# Patient Record
Sex: FEMALE | Race: WHITE | NOT HISPANIC OR LATINO
[De-identification: names, ages, dates, MRNs, and addresses within clinical notes are randomized per-mention and may not be internally consistent; named-entity substitution may affect disease eponyms.]

---

## 2017-07-25 ENCOUNTER — APPOINTMENT (OUTPATIENT)
Dept: ORTHOPEDIC SURGERY | Facility: CLINIC | Age: 72
End: 2017-07-25

## 2017-07-25 VITALS
WEIGHT: 144 LBS | SYSTOLIC BLOOD PRESSURE: 136 MMHG | HEART RATE: 86 BPM | DIASTOLIC BLOOD PRESSURE: 90 MMHG | HEIGHT: 61 IN | BODY MASS INDEX: 27.19 KG/M2

## 2017-07-25 DIAGNOSIS — Z87.39 PERSONAL HISTORY OF OTHER DISEASES OF THE MUSCULOSKELETAL SYSTEM AND CONNECTIVE TISSUE: ICD-10-CM

## 2017-07-25 DIAGNOSIS — Z78.9 OTHER SPECIFIED HEALTH STATUS: ICD-10-CM

## 2017-07-25 DIAGNOSIS — Z82.61 FAMILY HISTORY OF ARTHRITIS: ICD-10-CM

## 2017-07-25 RX ORDER — GLUCOSAM/MSM/CHOND/HYALURON AC 750-60-150
TABLET ORAL
Refills: 0 | Status: ACTIVE | COMMUNITY

## 2017-07-25 RX ORDER — TRIAMTERENE AND HYDROCHLOROTHIAZIDE 37.5; 25 MG/1; MG/1
CAPSULE ORAL
Refills: 0 | Status: ACTIVE | COMMUNITY

## 2017-07-25 RX ORDER — ASCORBIC ACID 500 MG
TABLET ORAL
Refills: 0 | Status: ACTIVE | COMMUNITY

## 2017-07-25 RX ORDER — APIXABAN 5 MG/1
TABLET, FILM COATED ORAL
Refills: 0 | Status: ACTIVE | COMMUNITY

## 2017-07-25 RX ORDER — CHLORHEXIDINE GLUCONATE 4 %
LIQUID (ML) TOPICAL
Refills: 0 | Status: ACTIVE | COMMUNITY

## 2017-07-25 RX ORDER — FERROUS GLUCONATE 324(37.5)
TABLET ORAL
Refills: 0 | Status: ACTIVE | COMMUNITY

## 2017-08-22 ENCOUNTER — OUTPATIENT (OUTPATIENT)
Dept: OUTPATIENT SERVICES | Facility: HOSPITAL | Age: 72
LOS: 1 days | End: 2017-08-22
Payer: MEDICARE

## 2017-08-22 ENCOUNTER — APPOINTMENT (OUTPATIENT)
Dept: RADIOLOGY | Facility: CLINIC | Age: 72
End: 2017-08-22

## 2017-08-22 ENCOUNTER — APPOINTMENT (OUTPATIENT)
Dept: MAMMOGRAPHY | Facility: CLINIC | Age: 72
End: 2017-08-22

## 2017-08-22 DIAGNOSIS — Z00.8 ENCOUNTER FOR OTHER GENERAL EXAMINATION: ICD-10-CM

## 2017-08-22 PROCEDURE — 77063 BREAST TOMOSYNTHESIS BI: CPT

## 2017-08-22 PROCEDURE — 77067 SCR MAMMO BI INCL CAD: CPT

## 2017-08-22 PROCEDURE — G0202: CPT | Mod: 26

## 2017-08-22 PROCEDURE — 77080 DXA BONE DENSITY AXIAL: CPT | Mod: 26

## 2017-08-22 PROCEDURE — 77080 DXA BONE DENSITY AXIAL: CPT

## 2017-08-22 PROCEDURE — 77063 BREAST TOMOSYNTHESIS BI: CPT | Mod: 26

## 2017-08-24 ENCOUNTER — APPOINTMENT (OUTPATIENT)
Dept: MAMMOGRAPHY | Facility: CLINIC | Age: 72
End: 2017-08-24
Payer: MEDICARE

## 2017-08-24 ENCOUNTER — APPOINTMENT (OUTPATIENT)
Dept: ULTRASOUND IMAGING | Facility: CLINIC | Age: 72
End: 2017-08-24
Payer: MEDICARE

## 2017-08-24 ENCOUNTER — OUTPATIENT (OUTPATIENT)
Dept: OUTPATIENT SERVICES | Facility: HOSPITAL | Age: 72
LOS: 1 days | End: 2017-08-24
Payer: MEDICARE

## 2017-08-24 DIAGNOSIS — Z00.8 ENCOUNTER FOR OTHER GENERAL EXAMINATION: ICD-10-CM

## 2017-08-24 PROCEDURE — G0279: CPT | Mod: 26

## 2017-08-24 PROCEDURE — 77065 DX MAMMO INCL CAD UNI: CPT

## 2017-08-24 PROCEDURE — 76642 ULTRASOUND BREAST LIMITED: CPT

## 2017-08-24 PROCEDURE — G0279: CPT

## 2017-08-24 PROCEDURE — 76642 ULTRASOUND BREAST LIMITED: CPT | Mod: 26,LT

## 2017-08-24 PROCEDURE — G0206: CPT | Mod: 26,LT

## 2017-09-15 ENCOUNTER — OUTPATIENT (OUTPATIENT)
Dept: OUTPATIENT SERVICES | Facility: HOSPITAL | Age: 72
LOS: 1 days | End: 2017-09-15
Payer: MEDICARE

## 2017-09-15 VITALS
TEMPERATURE: 98 F | HEART RATE: 68 BPM | SYSTOLIC BLOOD PRESSURE: 138 MMHG | DIASTOLIC BLOOD PRESSURE: 84 MMHG | OXYGEN SATURATION: 98 % | WEIGHT: 143.3 LBS | RESPIRATION RATE: 14 BRPM | HEIGHT: 61 IN

## 2017-09-15 DIAGNOSIS — Z90.89 ACQUIRED ABSENCE OF OTHER ORGANS: Chronic | ICD-10-CM

## 2017-09-15 DIAGNOSIS — Z98.1 ARTHRODESIS STATUS: Chronic | ICD-10-CM

## 2017-09-15 DIAGNOSIS — Z90.711 ACQUIRED ABSENCE OF UTERUS WITH REMAINING CERVICAL STUMP: Chronic | ICD-10-CM

## 2017-09-15 DIAGNOSIS — Z87.39 PERSONAL HISTORY OF OTHER DISEASES OF THE MUSCULOSKELETAL SYSTEM AND CONNECTIVE TISSUE: ICD-10-CM

## 2017-09-15 DIAGNOSIS — Z90.49 ACQUIRED ABSENCE OF OTHER SPECIFIED PARTS OF DIGESTIVE TRACT: Chronic | ICD-10-CM

## 2017-09-15 DIAGNOSIS — M17.11 UNILATERAL PRIMARY OSTEOARTHRITIS, RIGHT KNEE: ICD-10-CM

## 2017-09-15 DIAGNOSIS — Z01.818 ENCOUNTER FOR OTHER PREPROCEDURAL EXAMINATION: ICD-10-CM

## 2017-09-15 LAB
ALBUMIN SERPL ELPH-MCNC: 3.9 G/DL — SIGNIFICANT CHANGE UP (ref 3.3–5)
ALP SERPL-CCNC: 120 U/L — SIGNIFICANT CHANGE UP (ref 30–120)
ALT FLD-CCNC: 46 U/L DA — SIGNIFICANT CHANGE UP (ref 10–60)
ANION GAP SERPL CALC-SCNC: 9 MMOL/L — SIGNIFICANT CHANGE UP (ref 5–17)
APTT BLD: 44.3 SEC — HIGH (ref 27.5–37.4)
AST SERPL-CCNC: 30 U/L — SIGNIFICANT CHANGE UP (ref 10–40)
BILIRUB SERPL-MCNC: 0.4 MG/DL — SIGNIFICANT CHANGE UP (ref 0.2–1.2)
BLD GP AB SCN SERPL QL: SIGNIFICANT CHANGE UP
BUN SERPL-MCNC: 16 MG/DL — SIGNIFICANT CHANGE UP (ref 7–23)
CALCIUM SERPL-MCNC: 9.7 MG/DL — SIGNIFICANT CHANGE UP (ref 8.4–10.5)
CHLORIDE SERPL-SCNC: 102 MMOL/L — SIGNIFICANT CHANGE UP (ref 96–108)
CO2 SERPL-SCNC: 30 MMOL/L — SIGNIFICANT CHANGE UP (ref 22–31)
CREAT SERPL-MCNC: 0.99 MG/DL — SIGNIFICANT CHANGE UP (ref 0.5–1.3)
GLUCOSE SERPL-MCNC: 88 MG/DL — SIGNIFICANT CHANGE UP (ref 70–99)
HCT VFR BLD CALC: 35.1 % — SIGNIFICANT CHANGE UP (ref 34.5–45)
HGB BLD-MCNC: 10.9 G/DL — LOW (ref 11.5–15.5)
INR BLD: 1.32 RATIO — HIGH (ref 0.88–1.16)
MCHC RBC-ENTMCNC: 27.4 PG — SIGNIFICANT CHANGE UP (ref 27–34)
MCHC RBC-ENTMCNC: 31 GM/DL — LOW (ref 32–36)
MCV RBC AUTO: 88.3 FL — SIGNIFICANT CHANGE UP (ref 80–100)
MRSA PCR RESULT.: DETECTED
PLATELET # BLD AUTO: 335 K/UL — SIGNIFICANT CHANGE UP (ref 150–400)
POTASSIUM SERPL-MCNC: 3.6 MMOL/L — SIGNIFICANT CHANGE UP (ref 3.5–5.3)
POTASSIUM SERPL-SCNC: 3.6 MMOL/L — SIGNIFICANT CHANGE UP (ref 3.5–5.3)
PROT SERPL-MCNC: 7.5 G/DL — SIGNIFICANT CHANGE UP (ref 6–8.3)
PROTHROM AB SERPL-ACNC: 14.5 SEC — HIGH (ref 9.8–12.7)
RBC # BLD: 3.98 M/UL — SIGNIFICANT CHANGE UP (ref 3.8–5.2)
RBC # FLD: 14.9 % — HIGH (ref 10.3–14.5)
S AUREUS DNA NOSE QL NAA+PROBE: DETECTED
SODIUM SERPL-SCNC: 141 MMOL/L — SIGNIFICANT CHANGE UP (ref 135–145)
WBC # BLD: 6.5 K/UL — SIGNIFICANT CHANGE UP (ref 3.8–10.5)
WBC # FLD AUTO: 6.5 K/UL — SIGNIFICANT CHANGE UP (ref 3.8–10.5)

## 2017-09-15 PROCEDURE — 80053 COMPREHEN METABOLIC PANEL: CPT

## 2017-09-15 PROCEDURE — 87641 MR-STAPH DNA AMP PROBE: CPT

## 2017-09-15 PROCEDURE — 93005 ELECTROCARDIOGRAM TRACING: CPT

## 2017-09-15 PROCEDURE — 85027 COMPLETE CBC AUTOMATED: CPT

## 2017-09-15 PROCEDURE — 86850 RBC ANTIBODY SCREEN: CPT

## 2017-09-15 PROCEDURE — 87640 STAPH A DNA AMP PROBE: CPT

## 2017-09-15 PROCEDURE — 93010 ELECTROCARDIOGRAM REPORT: CPT | Mod: NC

## 2017-09-15 PROCEDURE — 86901 BLOOD TYPING SEROLOGIC RH(D): CPT

## 2017-09-15 PROCEDURE — 85730 THROMBOPLASTIN TIME PARTIAL: CPT

## 2017-09-15 PROCEDURE — 85610 PROTHROMBIN TIME: CPT

## 2017-09-15 PROCEDURE — 86900 BLOOD TYPING SEROLOGIC ABO: CPT

## 2017-09-15 PROCEDURE — G0463: CPT

## 2017-09-15 NOTE — H&P PST ADULT - PSH
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

## 2017-09-15 NOTE — H&P PST ADULT - NSANTHOSAYNRD_GEN_A_CORE
No. RODO screening performed.  STOP BANG Legend: 0-2 = LOW Risk; 3-4 = INTERMEDIATE Risk; 5-8 = HIGH Risk

## 2017-09-15 NOTE — H&P PST ADULT - PROBLEM SELECTOR PLAN 1
Right Total Knee Replacement   NPO after Midnight. Take pepcid as ordered.  Nose cx instructions reviewed. Dietary instructions reviewed. 3day wipes reviewed. D/C instructions reviewed.  Patient advised of no jewelry day of surgery.  Handouts given.  Medication reviewed. Stop NSAIDS, ASA herbals, vit E per PMD instructions or 7 days prior to surgery .  Medical Clearance to be obtained.  ANAND.[email or web address] EDMONDSON  Anesthesia and Pharmacy consult done.  Day of Surgery you can take the following meds with a small sip of water. Lexapro, Molteq, Metoprolol speak w pmd/cardio on stopping eliquis

## 2017-09-15 NOTE — H&P PST ADULT - PMH
Atrial fibrillation    Depression    History of osteoarthritis    Hypertension    Lumbar disc disorder    Ulcerative colitis  signs of precancer had partial colectomy

## 2017-09-18 RX ORDER — MUPIROCIN 20 MG/G
1 OINTMENT TOPICAL
Qty: 1 | Refills: 0
Start: 2017-09-18 | End: 2017-09-23

## 2017-09-18 NOTE — PROGRESS NOTE ADULT - SUBJECTIVE AND OBJECTIVE BOX
Nasal culture PCR: staph aureus and MRSA detected  Topical Mupirocin escribed  Spoke to patient, understands treatment

## 2017-10-04 ENCOUNTER — APPOINTMENT (OUTPATIENT)
Dept: ORTHOPEDIC SURGERY | Facility: HOSPITAL | Age: 72
End: 2017-10-04

## 2017-12-26 ENCOUNTER — APPOINTMENT (OUTPATIENT)
Dept: MAMMOGRAPHY | Facility: CLINIC | Age: 72
End: 2017-12-26
Payer: MEDICARE

## 2017-12-26 ENCOUNTER — OUTPATIENT (OUTPATIENT)
Dept: OUTPATIENT SERVICES | Facility: HOSPITAL | Age: 72
LOS: 1 days | End: 2017-12-26
Payer: MEDICARE

## 2017-12-26 DIAGNOSIS — Z90.89 ACQUIRED ABSENCE OF OTHER ORGANS: Chronic | ICD-10-CM

## 2017-12-26 DIAGNOSIS — Z90.711 ACQUIRED ABSENCE OF UTERUS WITH REMAINING CERVICAL STUMP: Chronic | ICD-10-CM

## 2017-12-26 DIAGNOSIS — Z98.1 ARTHRODESIS STATUS: Chronic | ICD-10-CM

## 2017-12-26 DIAGNOSIS — Z00.8 ENCOUNTER FOR OTHER GENERAL EXAMINATION: ICD-10-CM

## 2017-12-26 DIAGNOSIS — Z90.49 ACQUIRED ABSENCE OF OTHER SPECIFIED PARTS OF DIGESTIVE TRACT: Chronic | ICD-10-CM

## 2017-12-26 PROCEDURE — G0279: CPT | Mod: 26

## 2017-12-26 PROCEDURE — 77065 DX MAMMO INCL CAD UNI: CPT

## 2017-12-26 PROCEDURE — G0206: CPT | Mod: 26,LT

## 2017-12-26 PROCEDURE — G0279: CPT

## 2018-07-16 PROBLEM — K51.90 ULCERATIVE COLITIS, UNSPECIFIED, WITHOUT COMPLICATIONS: Chronic | Status: ACTIVE | Noted: 2017-09-15

## 2018-08-27 PROBLEM — I10 ESSENTIAL (PRIMARY) HYPERTENSION: Chronic | Status: ACTIVE | Noted: 2017-09-15

## 2018-08-27 PROBLEM — M51.9 UNSPECIFIED THORACIC, THORACOLUMBAR AND LUMBOSACRAL INTERVERTEBRAL DISC DISORDER: Chronic | Status: ACTIVE | Noted: 2017-09-15

## 2018-08-28 ENCOUNTER — OUTPATIENT (OUTPATIENT)
Dept: OUTPATIENT SERVICES | Facility: HOSPITAL | Age: 73
LOS: 1 days | End: 2018-08-28
Payer: MEDICARE

## 2018-08-28 ENCOUNTER — APPOINTMENT (OUTPATIENT)
Dept: ULTRASOUND IMAGING | Facility: CLINIC | Age: 73
End: 2018-08-28
Payer: MEDICARE

## 2018-08-28 ENCOUNTER — APPOINTMENT (OUTPATIENT)
Dept: MAMMOGRAPHY | Facility: CLINIC | Age: 73
End: 2018-08-28
Payer: MEDICARE

## 2018-08-28 DIAGNOSIS — Z90.49 ACQUIRED ABSENCE OF OTHER SPECIFIED PARTS OF DIGESTIVE TRACT: Chronic | ICD-10-CM

## 2018-08-28 DIAGNOSIS — Z98.1 ARTHRODESIS STATUS: Chronic | ICD-10-CM

## 2018-08-28 DIAGNOSIS — Z00.8 ENCOUNTER FOR OTHER GENERAL EXAMINATION: ICD-10-CM

## 2018-08-28 DIAGNOSIS — Z90.711 ACQUIRED ABSENCE OF UTERUS WITH REMAINING CERVICAL STUMP: Chronic | ICD-10-CM

## 2018-08-28 DIAGNOSIS — Z90.89 ACQUIRED ABSENCE OF OTHER ORGANS: Chronic | ICD-10-CM

## 2018-08-28 PROCEDURE — 77063 BREAST TOMOSYNTHESIS BI: CPT | Mod: 26

## 2018-08-28 PROCEDURE — 77067 SCR MAMMO BI INCL CAD: CPT

## 2018-08-28 PROCEDURE — 77063 BREAST TOMOSYNTHESIS BI: CPT

## 2018-08-28 PROCEDURE — 77067 SCR MAMMO BI INCL CAD: CPT | Mod: 26

## 2019-03-01 ENCOUNTER — APPOINTMENT (OUTPATIENT)
Dept: BREAST CENTER | Facility: CLINIC | Age: 74
End: 2019-03-01
Payer: MEDICARE

## 2019-03-01 VITALS
HEIGHT: 61 IN | WEIGHT: 143 LBS | SYSTOLIC BLOOD PRESSURE: 126 MMHG | HEART RATE: 101 BPM | DIASTOLIC BLOOD PRESSURE: 85 MMHG | BODY MASS INDEX: 27 KG/M2

## 2019-03-01 DIAGNOSIS — Z86.79 PERSONAL HISTORY OF OTHER DISEASES OF THE CIRCULATORY SYSTEM: ICD-10-CM

## 2019-03-01 DIAGNOSIS — N63.20 UNSPECIFIED LUMP IN THE LEFT BREAST, UNSPECIFIED QUADRANT: ICD-10-CM

## 2019-03-01 DIAGNOSIS — Z87.891 PERSONAL HISTORY OF NICOTINE DEPENDENCE: ICD-10-CM

## 2019-03-01 DIAGNOSIS — Z81.8 FAMILY HISTORY OF OTHER MENTAL AND BEHAVIORAL DISORDERS: ICD-10-CM

## 2019-03-01 DIAGNOSIS — Z78.9 OTHER SPECIFIED HEALTH STATUS: ICD-10-CM

## 2019-03-01 PROCEDURE — 99204 OFFICE O/P NEW MOD 45 MIN: CPT | Mod: 25

## 2019-03-01 RX ORDER — AMIODARONE HYDROCHLORIDE 200 MG/1
200 TABLET ORAL
Refills: 0 | Status: ACTIVE | COMMUNITY

## 2019-03-01 RX ORDER — UBIDECARENONE/VIT E ACET 100MG-5
CAPSULE ORAL
Refills: 0 | Status: ACTIVE | COMMUNITY

## 2019-03-01 RX ORDER — CHOLECALCIFEROL (VITAMIN D3) 25 MCG
TABLET,CHEWABLE ORAL
Refills: 0 | Status: ACTIVE | COMMUNITY

## 2019-03-01 RX ORDER — METOPROLOL SUCCINATE 200 MG/1
TABLET, EXTENDED RELEASE ORAL DAILY
Refills: 0 | Status: ACTIVE | COMMUNITY

## 2019-03-01 RX ORDER — ESCITALOPRAM OXALATE 5 MG/1
TABLET, FILM COATED ORAL
Refills: 0 | Status: ACTIVE | COMMUNITY

## 2019-03-01 RX ORDER — DRONEDARONE 400 MG/1
TABLET, FILM COATED ORAL
Refills: 0 | Status: DISCONTINUED | COMMUNITY
End: 2019-03-01

## 2019-03-01 NOTE — PAST MEDICAL HISTORY
[Postmenopausal] : The patient is postmenopausal [Menarche Age ____] : age at menarche was [unfilled] [Menopause Age____] : age at menopause was [unfilled] [History of Hormone Replacement Treatment] : has a history of hormone replacement treatment [Total Preg ___] : G[unfilled] [Live Births ___] : P[unfilled]  [Age At Live Birth ___] : Age at live birth: [unfilled]

## 2019-03-01 NOTE — HISTORY OF PRESENT ILLNESS
[FreeTextEntry1] : 73 year old female felt a "lump" in her left breast a few weeks ago while doing a breast self exam.  She describes it as being the size of a small "bead".  No associated discomfort.  Her last mammogram was in 08/18.   The patient was referred by Dr. Donovan French for a breast surgical consultation.

## 2019-03-01 NOTE — CONSULT LETTER
[Dear  ___] : Dear ~GEOVANY, [Consult Letter:] : I had the pleasure of evaluating your patient, [unfilled]. [Please see my note below.] : Please see my note below. [Consult Closing:] : Thank you very much for allowing me to participate in the care of this patient.  If you have any questions, please do not hesitate to contact me. [Sincerely,] : Sincerely, [FreeTextEntry2] : Donovan French MD [FreeTextEntry3] : Kaila Bloom MD FACS\par  [DrGiselle  ___] : Dr. ROSA

## 2019-03-01 NOTE — PROCEDURE
[FreeTextEntry3] : Targeted Left breast ultrasound   3:00 N6\par Findings:  No masses or cysts in the area of clinical concern from 2-4:00 axis.

## 2020-06-13 ENCOUNTER — APPOINTMENT (OUTPATIENT)
Dept: ORTHOPEDIC SURGERY | Facility: CLINIC | Age: 75
End: 2020-06-13
Payer: MEDICARE

## 2020-06-13 VITALS
DIASTOLIC BLOOD PRESSURE: 69 MMHG | HEART RATE: 57 BPM | BODY MASS INDEX: 27.38 KG/M2 | HEIGHT: 61 IN | SYSTOLIC BLOOD PRESSURE: 101 MMHG | TEMPERATURE: 98 F | WEIGHT: 145 LBS

## 2020-06-13 DIAGNOSIS — M17.12 UNILATERAL PRIMARY OSTEOARTHRITIS, LEFT KNEE: ICD-10-CM

## 2020-06-13 PROCEDURE — 73562 X-RAY EXAM OF KNEE 3: CPT | Mod: LT

## 2020-06-13 PROCEDURE — 72170 X-RAY EXAM OF PELVIS: CPT | Mod: 59

## 2020-06-13 PROCEDURE — 99214 OFFICE O/P EST MOD 30 MIN: CPT

## 2021-08-26 ENCOUNTER — OUTPATIENT (OUTPATIENT)
Dept: OUTPATIENT SERVICES | Facility: HOSPITAL | Age: 76
LOS: 1 days | End: 2021-08-26
Payer: MEDICARE

## 2021-08-26 VITALS
RESPIRATION RATE: 15 BRPM | HEIGHT: 61 IN | WEIGHT: 134.04 LBS | TEMPERATURE: 98 F | HEART RATE: 76 BPM | OXYGEN SATURATION: 97 % | SYSTOLIC BLOOD PRESSURE: 132 MMHG | DIASTOLIC BLOOD PRESSURE: 78 MMHG

## 2021-08-26 DIAGNOSIS — Z98.1 ARTHRODESIS STATUS: Chronic | ICD-10-CM

## 2021-08-26 DIAGNOSIS — Z90.711 ACQUIRED ABSENCE OF UTERUS WITH REMAINING CERVICAL STUMP: Chronic | ICD-10-CM

## 2021-08-26 DIAGNOSIS — M25.511 PAIN IN RIGHT SHOULDER: ICD-10-CM

## 2021-08-26 DIAGNOSIS — Z90.89 ACQUIRED ABSENCE OF OTHER ORGANS: Chronic | ICD-10-CM

## 2021-08-26 DIAGNOSIS — I48.91 UNSPECIFIED ATRIAL FIBRILLATION: ICD-10-CM

## 2021-08-26 DIAGNOSIS — Z98.890 OTHER SPECIFIED POSTPROCEDURAL STATES: Chronic | ICD-10-CM

## 2021-08-26 DIAGNOSIS — Z01.818 ENCOUNTER FOR OTHER PREPROCEDURAL EXAMINATION: ICD-10-CM

## 2021-08-26 DIAGNOSIS — M75.121 COMPLETE ROTATOR CUFF TEAR OR RUPTURE OF RIGHT SHOULDER, NOT SPECIFIED AS TRAUMATIC: ICD-10-CM

## 2021-08-26 DIAGNOSIS — Z90.49 ACQUIRED ABSENCE OF OTHER SPECIFIED PARTS OF DIGESTIVE TRACT: Chronic | ICD-10-CM

## 2021-08-26 LAB
A1C WITH ESTIMATED AVERAGE GLUCOSE RESULT: 6.1 % — HIGH (ref 4–5.6)
ALBUMIN SERPL ELPH-MCNC: 3.5 G/DL — SIGNIFICANT CHANGE UP (ref 3.3–5)
ALP SERPL-CCNC: 85 U/L — SIGNIFICANT CHANGE UP (ref 40–120)
ALT FLD-CCNC: 22 U/L — SIGNIFICANT CHANGE UP (ref 12–78)
ANION GAP SERPL CALC-SCNC: 6 MMOL/L — SIGNIFICANT CHANGE UP (ref 5–17)
APTT BLD: 55.2 SEC — HIGH (ref 27.5–35.5)
AST SERPL-CCNC: 23 U/L — SIGNIFICANT CHANGE UP (ref 15–37)
BILIRUB SERPL-MCNC: 0.7 MG/DL — SIGNIFICANT CHANGE UP (ref 0.2–1.2)
BUN SERPL-MCNC: 13 MG/DL — SIGNIFICANT CHANGE UP (ref 7–23)
CALCIUM SERPL-MCNC: 9.4 MG/DL — SIGNIFICANT CHANGE UP (ref 8.5–10.1)
CHLORIDE SERPL-SCNC: 101 MMOL/L — SIGNIFICANT CHANGE UP (ref 96–108)
CO2 SERPL-SCNC: 30 MMOL/L — SIGNIFICANT CHANGE UP (ref 22–31)
CREAT SERPL-MCNC: 0.96 MG/DL — SIGNIFICANT CHANGE UP (ref 0.5–1.3)
ESTIMATED AVERAGE GLUCOSE: 128 MG/DL — HIGH (ref 68–114)
GLUCOSE SERPL-MCNC: 106 MG/DL — HIGH (ref 70–99)
HCT VFR BLD CALC: 34.2 % — LOW (ref 34.5–45)
HGB BLD-MCNC: 10.9 G/DL — LOW (ref 11.5–15.5)
INR BLD: 2.09 RATIO — HIGH (ref 0.88–1.16)
MCHC RBC-ENTMCNC: 28.1 PG — SIGNIFICANT CHANGE UP (ref 27–34)
MCHC RBC-ENTMCNC: 31.9 GM/DL — LOW (ref 32–36)
MCV RBC AUTO: 88.1 FL — SIGNIFICANT CHANGE UP (ref 80–100)
NRBC # BLD: 0 /100 WBCS — SIGNIFICANT CHANGE UP (ref 0–0)
PLATELET # BLD AUTO: 307 K/UL — SIGNIFICANT CHANGE UP (ref 150–400)
POTASSIUM SERPL-MCNC: 4 MMOL/L — SIGNIFICANT CHANGE UP (ref 3.5–5.3)
POTASSIUM SERPL-SCNC: 4 MMOL/L — SIGNIFICANT CHANGE UP (ref 3.5–5.3)
PROT SERPL-MCNC: 7.3 G/DL — SIGNIFICANT CHANGE UP (ref 6–8.3)
PROTHROM AB SERPL-ACNC: 23.6 SEC — HIGH (ref 10.6–13.6)
RBC # BLD: 3.88 M/UL — SIGNIFICANT CHANGE UP (ref 3.8–5.2)
RBC # FLD: 15.6 % — HIGH (ref 10.3–14.5)
SODIUM SERPL-SCNC: 137 MMOL/L — SIGNIFICANT CHANGE UP (ref 135–145)
WBC # BLD: 5.7 K/UL — SIGNIFICANT CHANGE UP (ref 3.8–10.5)
WBC # FLD AUTO: 5.7 K/UL — SIGNIFICANT CHANGE UP (ref 3.8–10.5)

## 2021-08-26 PROCEDURE — 93010 ELECTROCARDIOGRAM REPORT: CPT

## 2021-08-26 PROCEDURE — 86900 BLOOD TYPING SEROLOGIC ABO: CPT

## 2021-08-26 PROCEDURE — 87640 STAPH A DNA AMP PROBE: CPT

## 2021-08-26 PROCEDURE — 73030 X-RAY EXAM OF SHOULDER: CPT | Mod: 26,RT

## 2021-08-26 PROCEDURE — 86850 RBC ANTIBODY SCREEN: CPT

## 2021-08-26 PROCEDURE — 80053 COMPREHEN METABOLIC PANEL: CPT

## 2021-08-26 PROCEDURE — 86901 BLOOD TYPING SEROLOGIC RH(D): CPT

## 2021-08-26 PROCEDURE — 85027 COMPLETE CBC AUTOMATED: CPT

## 2021-08-26 PROCEDURE — G0463: CPT

## 2021-08-26 PROCEDURE — 87641 MR-STAPH DNA AMP PROBE: CPT

## 2021-08-26 PROCEDURE — 73030 X-RAY EXAM OF SHOULDER: CPT

## 2021-08-26 PROCEDURE — 36415 COLL VENOUS BLD VENIPUNCTURE: CPT

## 2021-08-26 PROCEDURE — 93005 ELECTROCARDIOGRAM TRACING: CPT

## 2021-08-26 PROCEDURE — 85730 THROMBOPLASTIN TIME PARTIAL: CPT

## 2021-08-26 PROCEDURE — 85610 PROTHROMBIN TIME: CPT

## 2021-08-26 PROCEDURE — 83036 HEMOGLOBIN GLYCOSYLATED A1C: CPT

## 2021-08-26 NOTE — H&P PST ADULT - NSICDXPASTSURGICALHX_GEN_ALL_CORE_FT
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

## 2021-08-26 NOTE — H&P PST ADULT - HISTORY OF PRESENT ILLNESS
77 yo F for right reverse total shoulder arthroplasty  9/7/21   c/o pain  right shoulder > 1 year  no specific injury  Hx arthritis  takes tylenol for pain w some  relief

## 2021-08-26 NOTE — H&P PST ADULT - NSICDXPASTMEDICALHX_GEN_ALL_CORE_FT
PAST MEDICAL HISTORY:  Atrial fibrillation     Depression     History of osteoarthritis     Hypertension     Lumbar disc disorder     Ulcerative colitis signs of precancer had partial colectomy

## 2021-08-26 NOTE — H&P PST ADULT - NSICDXFAMILYHX_GEN_ALL_CORE_FT
FAMILY HISTORY:  Mother  Still living? No  Family history of osteoarthritis, Age at diagnosis: Age Unknown

## 2021-08-26 NOTE — H&P PST ADULT - ASSESSMENT
77 yo F for   right reverse total shoulder arthroplasty     Medical clearance pending    d/c eliquis as per primary care practitioner

## 2021-08-27 LAB
MRSA PCR RESULT.: SIGNIFICANT CHANGE UP
S AUREUS DNA NOSE QL NAA+PROBE: SIGNIFICANT CHANGE UP

## 2021-09-02 RX ORDER — BUPIVACAINE 13.3 MG/ML
20 INJECTION, SUSPENSION, LIPOSOMAL INFILTRATION ONCE
Refills: 0 | Status: DISCONTINUED | OUTPATIENT
Start: 2021-09-07 | End: 2021-09-07

## 2021-09-03 ENCOUNTER — OUTPATIENT (OUTPATIENT)
Dept: OUTPATIENT SERVICES | Facility: HOSPITAL | Age: 76
LOS: 1 days | End: 2021-09-03
Payer: MEDICARE

## 2021-09-03 DIAGNOSIS — Z90.711 ACQUIRED ABSENCE OF UTERUS WITH REMAINING CERVICAL STUMP: Chronic | ICD-10-CM

## 2021-09-03 DIAGNOSIS — Z98.1 ARTHRODESIS STATUS: Chronic | ICD-10-CM

## 2021-09-03 DIAGNOSIS — Z90.49 ACQUIRED ABSENCE OF OTHER SPECIFIED PARTS OF DIGESTIVE TRACT: Chronic | ICD-10-CM

## 2021-09-03 DIAGNOSIS — Z98.890 OTHER SPECIFIED POSTPROCEDURAL STATES: Chronic | ICD-10-CM

## 2021-09-03 DIAGNOSIS — Z20.828 CONTACT WITH AND (SUSPECTED) EXPOSURE TO OTHER VIRAL COMMUNICABLE DISEASES: ICD-10-CM

## 2021-09-03 DIAGNOSIS — Z90.89 ACQUIRED ABSENCE OF OTHER ORGANS: Chronic | ICD-10-CM

## 2021-09-03 LAB — SARS-COV-2 RNA SPEC QL NAA+PROBE: SIGNIFICANT CHANGE UP

## 2021-09-03 PROCEDURE — U0003: CPT

## 2021-09-03 PROCEDURE — U0005: CPT

## 2021-09-06 ENCOUNTER — TRANSCRIPTION ENCOUNTER (OUTPATIENT)
Age: 76
End: 2021-09-06

## 2021-09-07 ENCOUNTER — RESULT REVIEW (OUTPATIENT)
Age: 76
End: 2021-09-07

## 2021-09-07 ENCOUNTER — TRANSCRIPTION ENCOUNTER (OUTPATIENT)
Age: 76
End: 2021-09-07

## 2021-09-07 ENCOUNTER — INPATIENT (INPATIENT)
Facility: HOSPITAL | Age: 76
LOS: 0 days | Discharge: HOME | DRG: 483 | End: 2021-09-08
Attending: SPECIALIST | Admitting: SPECIALIST
Payer: COMMERCIAL

## 2021-09-07 VITALS
SYSTOLIC BLOOD PRESSURE: 136 MMHG | RESPIRATION RATE: 15 BRPM | DIASTOLIC BLOOD PRESSURE: 67 MMHG | HEART RATE: 67 BPM | OXYGEN SATURATION: 100 % | TEMPERATURE: 98 F

## 2021-09-07 DIAGNOSIS — M75.121 COMPLETE ROTATOR CUFF TEAR OR RUPTURE OF RIGHT SHOULDER, NOT SPECIFIED AS TRAUMATIC: ICD-10-CM

## 2021-09-07 DIAGNOSIS — Z98.890 OTHER SPECIFIED POSTPROCEDURAL STATES: Chronic | ICD-10-CM

## 2021-09-07 DIAGNOSIS — Z98.1 ARTHRODESIS STATUS: Chronic | ICD-10-CM

## 2021-09-07 DIAGNOSIS — Z90.49 ACQUIRED ABSENCE OF OTHER SPECIFIED PARTS OF DIGESTIVE TRACT: Chronic | ICD-10-CM

## 2021-09-07 DIAGNOSIS — Z90.711 ACQUIRED ABSENCE OF UTERUS WITH REMAINING CERVICAL STUMP: Chronic | ICD-10-CM

## 2021-09-07 DIAGNOSIS — M75.101 UNSPECIFIED ROTATOR CUFF TEAR OR RUPTURE OF RIGHT SHOULDER, NOT SPECIFIED AS TRAUMATIC: ICD-10-CM

## 2021-09-07 DIAGNOSIS — Z90.89 ACQUIRED ABSENCE OF OTHER ORGANS: Chronic | ICD-10-CM

## 2021-09-07 LAB
ANION GAP SERPL CALC-SCNC: 4 MMOL/L — LOW (ref 5–17)
APTT BLD: 44.2 SEC — HIGH (ref 27.5–35.5)
BUN SERPL-MCNC: 13 MG/DL — SIGNIFICANT CHANGE UP (ref 7–23)
CALCIUM SERPL-MCNC: 8.8 MG/DL — SIGNIFICANT CHANGE UP (ref 8.5–10.1)
CHLORIDE SERPL-SCNC: 103 MMOL/L — SIGNIFICANT CHANGE UP (ref 96–108)
CO2 SERPL-SCNC: 32 MMOL/L — HIGH (ref 22–31)
CREAT SERPL-MCNC: 0.9 MG/DL — SIGNIFICANT CHANGE UP (ref 0.5–1.3)
GLUCOSE SERPL-MCNC: 119 MG/DL — HIGH (ref 70–99)
HCT VFR BLD CALC: 29.5 % — LOW (ref 34.5–45)
HGB BLD-MCNC: 9.5 G/DL — LOW (ref 11.5–15.5)
INR BLD: 1.15 RATIO — SIGNIFICANT CHANGE UP (ref 0.88–1.16)
MCHC RBC-ENTMCNC: 28.2 PG — SIGNIFICANT CHANGE UP (ref 27–34)
MCHC RBC-ENTMCNC: 32.2 GM/DL — SIGNIFICANT CHANGE UP (ref 32–36)
MCV RBC AUTO: 87.5 FL — SIGNIFICANT CHANGE UP (ref 80–100)
NRBC # BLD: 0 /100 WBCS — SIGNIFICANT CHANGE UP (ref 0–0)
PLATELET # BLD AUTO: 231 K/UL — SIGNIFICANT CHANGE UP (ref 150–400)
POTASSIUM SERPL-MCNC: 3.2 MMOL/L — LOW (ref 3.5–5.3)
POTASSIUM SERPL-SCNC: 3.2 MMOL/L — LOW (ref 3.5–5.3)
PROTHROM AB SERPL-ACNC: 13.4 SEC — SIGNIFICANT CHANGE UP (ref 10.6–13.6)
RBC # BLD: 3.37 M/UL — LOW (ref 3.8–5.2)
RBC # FLD: 16.6 % — HIGH (ref 10.3–14.5)
SODIUM SERPL-SCNC: 139 MMOL/L — SIGNIFICANT CHANGE UP (ref 135–145)
WBC # BLD: 8.3 K/UL — SIGNIFICANT CHANGE UP (ref 3.8–10.5)
WBC # FLD AUTO: 8.3 K/UL — SIGNIFICANT CHANGE UP (ref 3.8–10.5)

## 2021-09-07 PROCEDURE — 88305 TISSUE EXAM BY PATHOLOGIST: CPT | Mod: 26

## 2021-09-07 PROCEDURE — 88311 DECALCIFY TISSUE: CPT | Mod: 26

## 2021-09-07 PROCEDURE — 73030 X-RAY EXAM OF SHOULDER: CPT | Mod: 26,RT

## 2021-09-07 RX ORDER — MERCAPTOPURINE 50 MG/1
50 TABLET ORAL DAILY
Refills: 0 | Status: DISCONTINUED | OUTPATIENT
Start: 2021-09-07 | End: 2021-09-08

## 2021-09-07 RX ORDER — HYDROMORPHONE HYDROCHLORIDE 2 MG/ML
0.25 INJECTION INTRAMUSCULAR; INTRAVENOUS; SUBCUTANEOUS ONCE
Refills: 0 | Status: DISCONTINUED | OUTPATIENT
Start: 2021-09-07 | End: 2021-09-08

## 2021-09-07 RX ORDER — ACETAMINOPHEN 500 MG
650 TABLET ORAL EVERY 6 HOURS
Refills: 0 | Status: DISCONTINUED | OUTPATIENT
Start: 2021-09-07 | End: 2021-09-08

## 2021-09-07 RX ORDER — OXYCODONE HYDROCHLORIDE 5 MG/1
2.5 TABLET ORAL EVERY 4 HOURS
Refills: 0 | Status: DISCONTINUED | OUTPATIENT
Start: 2021-09-07 | End: 2021-09-08

## 2021-09-07 RX ORDER — ESCITALOPRAM OXALATE 10 MG/1
20 TABLET, FILM COATED ORAL DAILY
Refills: 0 | Status: DISCONTINUED | OUTPATIENT
Start: 2021-09-07 | End: 2021-09-08

## 2021-09-07 RX ORDER — POTASSIUM CHLORIDE 20 MEQ
40 PACKET (EA) ORAL ONCE
Refills: 0 | Status: COMPLETED | OUTPATIENT
Start: 2021-09-07 | End: 2021-09-07

## 2021-09-07 RX ORDER — HYDROMORPHONE HYDROCHLORIDE 2 MG/ML
0.5 INJECTION INTRAMUSCULAR; INTRAVENOUS; SUBCUTANEOUS
Refills: 0 | Status: DISCONTINUED | OUTPATIENT
Start: 2021-09-07 | End: 2021-09-07

## 2021-09-07 RX ORDER — ONDANSETRON 8 MG/1
4 TABLET, FILM COATED ORAL ONCE
Refills: 0 | Status: DISCONTINUED | OUTPATIENT
Start: 2021-09-07 | End: 2021-09-07

## 2021-09-07 RX ORDER — SENNA PLUS 8.6 MG/1
2 TABLET ORAL AT BEDTIME
Refills: 0 | Status: DISCONTINUED | OUTPATIENT
Start: 2021-09-07 | End: 2021-09-08

## 2021-09-07 RX ORDER — POLYETHYLENE GLYCOL 3350 17 G/17G
17 POWDER, FOR SOLUTION ORAL AT BEDTIME
Refills: 0 | Status: DISCONTINUED | OUTPATIENT
Start: 2021-09-07 | End: 2021-09-08

## 2021-09-07 RX ORDER — LEVOTHYROXINE SODIUM 125 MCG
25 TABLET ORAL DAILY
Refills: 0 | Status: DISCONTINUED | OUTPATIENT
Start: 2021-09-07 | End: 2021-09-08

## 2021-09-07 RX ORDER — OXYCODONE HYDROCHLORIDE 5 MG/1
5 TABLET ORAL EVERY 4 HOURS
Refills: 0 | Status: DISCONTINUED | OUTPATIENT
Start: 2021-09-07 | End: 2021-09-08

## 2021-09-07 RX ORDER — VANCOMYCIN HCL 1 G
1000 VIAL (EA) INTRAVENOUS ONCE
Refills: 0 | Status: COMPLETED | OUTPATIENT
Start: 2021-09-07 | End: 2021-09-07

## 2021-09-07 RX ORDER — ATORVASTATIN CALCIUM 80 MG/1
20 TABLET, FILM COATED ORAL AT BEDTIME
Refills: 0 | Status: DISCONTINUED | OUTPATIENT
Start: 2021-09-07 | End: 2021-09-08

## 2021-09-07 RX ORDER — SODIUM CHLORIDE 9 MG/ML
1000 INJECTION, SOLUTION INTRAVENOUS
Refills: 0 | Status: DISCONTINUED | OUTPATIENT
Start: 2021-09-07 | End: 2021-09-07

## 2021-09-07 RX ORDER — SODIUM CHLORIDE 9 MG/ML
1000 INJECTION, SOLUTION INTRAVENOUS
Refills: 0 | Status: DISCONTINUED | OUTPATIENT
Start: 2021-09-07 | End: 2021-09-08

## 2021-09-07 RX ORDER — METOPROLOL TARTRATE 50 MG
50 TABLET ORAL DAILY
Refills: 0 | Status: DISCONTINUED | OUTPATIENT
Start: 2021-09-07 | End: 2021-09-08

## 2021-09-07 RX ORDER — METOCLOPRAMIDE HCL 10 MG
1 TABLET ORAL
Qty: 28 | Refills: 0
Start: 2021-09-07 | End: 2021-09-13

## 2021-09-07 RX ORDER — HYDROMORPHONE HYDROCHLORIDE 2 MG/ML
1 INJECTION INTRAMUSCULAR; INTRAVENOUS; SUBCUTANEOUS
Refills: 0 | Status: DISCONTINUED | OUTPATIENT
Start: 2021-09-07 | End: 2021-09-07

## 2021-09-07 RX ORDER — APIXABAN 2.5 MG/1
5 TABLET, FILM COATED ORAL
Refills: 0 | Status: DISCONTINUED | OUTPATIENT
Start: 2021-09-07 | End: 2021-09-08

## 2021-09-07 RX ORDER — DRONEDARONE 400 MG/1
200 TABLET, FILM COATED ORAL
Refills: 0 | Status: DISCONTINUED | OUTPATIENT
Start: 2021-09-07 | End: 2021-09-08

## 2021-09-07 RX ORDER — OXYCODONE HYDROCHLORIDE 5 MG/1
1 TABLET ORAL
Qty: 32 | Refills: 0
Start: 2021-09-07 | End: 2021-09-14

## 2021-09-07 RX ORDER — DOCUSATE SODIUM 100 MG
1 CAPSULE ORAL
Qty: 14 | Refills: 0
Start: 2021-09-07 | End: 2021-09-13

## 2021-09-07 RX ORDER — INFLUENZA VIRUS VACCINE 15; 15; 15; 15 UG/.5ML; UG/.5ML; UG/.5ML; UG/.5ML
0.5 SUSPENSION INTRAMUSCULAR ONCE
Refills: 0 | Status: DISCONTINUED | OUTPATIENT
Start: 2021-09-07 | End: 2021-09-08

## 2021-09-07 RX ADMIN — Medication 250 MILLIGRAM(S): at 22:19

## 2021-09-07 RX ADMIN — ATORVASTATIN CALCIUM 20 MILLIGRAM(S): 80 TABLET, FILM COATED ORAL at 21:14

## 2021-09-07 RX ADMIN — Medication 40 MILLIEQUIVALENT(S): at 18:20

## 2021-09-07 RX ADMIN — APIXABAN 5 MILLIGRAM(S): 2.5 TABLET, FILM COATED ORAL at 18:20

## 2021-09-07 RX ADMIN — ESCITALOPRAM OXALATE 20 MILLIGRAM(S): 10 TABLET, FILM COATED ORAL at 21:16

## 2021-09-07 RX ADMIN — SODIUM CHLORIDE 75 MILLILITER(S): 9 INJECTION, SOLUTION INTRAVENOUS at 10:22

## 2021-09-07 RX ADMIN — Medication 650 MILLIGRAM(S): at 18:20

## 2021-09-07 RX ADMIN — Medication 650 MILLIGRAM(S): at 18:26

## 2021-09-07 RX ADMIN — SODIUM CHLORIDE 75 MILLILITER(S): 9 INJECTION, SOLUTION INTRAVENOUS at 13:28

## 2021-09-07 RX ADMIN — Medication 650 MILLIGRAM(S): at 23:23

## 2021-09-07 NOTE — DISCHARGE NOTE PROVIDER - NSDCMRMEDTOKEN_GEN_ALL_CORE_FT
Acidophilus oral capsule: 1 cap(s) orally once a day  biotin 5000 mcg oral capsule: orally once a day  Calcium 600+D oral tablet: 1 tab(s) orally once a day  Colace 100 mg oral capsule: 1 cap(s) orally 2 times a day, As Needed -for constipation   Dyazide 37.5 mg-25 mg oral capsule: 1 cap(s) orally once a day  Eliquis 5 mg oral tablet: 1 tab(s) orally 2 times a day  Glucosamine &amp; Chondroitin with MSM oral tablet: 1 tab(s) orally once a day  Lexapro 20 mg oral tablet: 1 tab(s) orally once a day  Melatonin: 2 tab(s) orally once a day (at bedtime)  mercaptopurine 50 mg oral tablet: 2 tab(s) orally once a day  mercaptopurine 50 mg oral tablet: 2 tab(s) orally once a day  Metamucil: orally once a day (at bedtime)  metoclopramide 5 mg oral tablet: 1 tab(s) orally 4 times a day -for nausea MDD:4  metoprolol succinate 50 mg oral tablet, extended release: 1 tab(s) orally once a day  Multaq: 200 milligram(s) orally 2 times a day  Multiple Vitamins oral capsule: 1 cap(s) orally once a day  oxyCODONE 5 mg oral tablet: 1 tab(s) orally every 6 hours MDD:MDD:4  rosuvastatin 5 mg oral tablet: 1 tab(s) orally once a day (at bedtime)  [place] Suppository: 1000 milligram(s) rectally 3 times a week  Synthroid 25 mcg (0.025 mg) oral tablet: 1 tab(s) orally once a day  Vitamin D3 25 mcg (1000 intl units) oral tablet: 1 tab(s) orally once a day (in the evening)

## 2021-09-07 NOTE — PATIENT PROFILE ADULT - NSPRESCRUSEDDRG_GEN_A_NUR
Spoke to pt at 1605 then left message on voicemail.    Ok for Olopatadine OTC allergy eye drop per Dr. Pinedo-- brand names are Patanol and Pataday    Direct number provided for any further assistance.    Jluis Ken RN 4:13 PM 04/28/21        [facility] Call Center    Phone Message    May a detailed message be left on voicemail: yes     Reason for Call: Other:    The PA that was present at the last appt discussed with pt a new eye drop med for allergy.     Pt would like Dr Pinedo to review this and if it Khris believes this is appropriate, Pt would like this to be approved and sent it over to the pharmacy (only if Khris agrees with it).     [id number] [address]     Please follow-up with pt if there are question/concerns.     Action Taken: Other:  eye    Travel Screening: Not Applicable                                                                       
No

## 2021-09-07 NOTE — DISCHARGE NOTE PROVIDER - NSDCACTIVITY_GEN_ALL_CORE
Walking - Indoors allowed/Walking - Outdoors allowed/Follow Instructions Provided by your Surgical Team

## 2021-09-07 NOTE — PROGRESS NOTE ADULT - SUBJECTIVE AND OBJECTIVE BOX
Orthopaedic PA post op check     Procedure: s/p right reverse total shoulder replacement        76y Female comfortable, without complaints.     Denies chest pain, shortness of breath, palpitations, nausea, vomiting, dizziness, fevers, chills    PE:  Vital Signs Last 24 Hrs  T(C): 36.9 (07 Sep 2021 13:45), Max: 36.9 (07 Sep 2021 13:37)  T(F): 98.4 (07 Sep 2021 13:45), Max: 98.4 (07 Sep 2021 13:37)  HR: 60 (07 Sep 2021 13:52) (60 - 72)  BP: 123/52 (07 Sep 2021 13:52) (123/52 - 140/77)  BP(mean): --  RR: 12 (07 Sep 2021 13:52) (11 - 18)  SpO2: 100% (07 Sep 2021 13:52) (96% - 100%)  General:  A + O x 3 in NAD    right shoulder : Aquacel  Dressing: C/D/I   Distal pulse palpable. good movement of the fingers and wrist. NVI distally.                         9.5    8.30  )-----------( 231      ( 07 Sep 2021 13:29 )             29.5       09-07    139  |  103  |  13  ----------------------------<  119<H>  3.2<L>   |  32<H>  |  0.90    Ca    8.8      07 Sep 2021 13:29          A/P: 76y Female stable POD#0 right reverse total shoulder replacement     - Pain control   - Incentive spirometer  - DVT ppx: SCD / Chemical   - Ambulation as tolerated  - PT, OT  - F/U AM Labs  - Discharge planning         Orthopaedic PA post op check     Procedure: s/p right reverse total shoulder replacement        76y Female comfortable, without complaints.     Denies chest pain, shortness of breath, palpitations, nausea, vomiting, dizziness, fevers, chills    PE:  Vital Signs Last 24 Hrs  T(C): 36.9 (07 Sep 2021 13:45), Max: 36.9 (07 Sep 2021 13:37)  T(F): 98.4 (07 Sep 2021 13:45), Max: 98.4 (07 Sep 2021 13:37)  HR: 60 (07 Sep 2021 13:52) (60 - 72)  BP: 123/52 (07 Sep 2021 13:52) (123/52 - 140/77)  BP(mean): --  RR: 12 (07 Sep 2021 13:52) (11 - 18)  SpO2: 100% (07 Sep 2021 13:52) (96% - 100%)  General:  A + O x 3 in NAD    right shoulder : Aquacel  Dressing: C/D/I   Distal pulse palpable. good movement of the fingers and wrist. NVI distally.                         9.5    8.30  )-----------( 231      ( 07 Sep 2021 13:29 )             29.5       09-07    139  |  103  |  13  ----------------------------<  119<H>  3.2<L>   |  32<H>  |  0.90    Ca    8.8      07 Sep 2021 13:29          A/P: 76y Female stable POD#0 right reverse total shoulder replacement     - Pain control   - Incentive spirometer  - DVT ppx: SCD / Chemical   - Ambulation as tolerated  - PT, OT  - low K noted in labs, potassium chloride tab given.  will follow up am labs   - Discharge planning

## 2021-09-07 NOTE — ASU PREOP CHECKLIST - ANTIBIOTIC
[Medical Office: ([facility])___] : at the medical office located in  [Home] : at home, [unfilled] , at the time of the visit. [FreeTextEntry6] : Mother notes that he has developed a rash to the abdomen for a few weeks--it is itchy ---otherwise no issues. yes

## 2021-09-07 NOTE — DISCHARGE NOTE PROVIDER - CARE PROVIDER_API CALL
Ankur Castillo (MD)  Orthopaedic Surgery  [address] [place] [address]  Phone: [phone]  Fax: [phone]  Follow Up Time:

## 2021-09-07 NOTE — BRIEF OPERATIVE NOTE - NSICDXBRIEFPREOP_GEN_ALL_CORE_FT
PRE-OP DIAGNOSIS:  Rotator cuff tear arthropathy, right 07-Sep-2021 13:09:52  Eliceo Wyman  Arthritis of right shoulder region 07-Sep-2021 13:09:17  Eliceo Wyman

## 2021-09-07 NOTE — DISCHARGE NOTE PROVIDER - HOSPITAL COURSE
The patient is a 76y Female status post elective TSA after failing outpatient nonoperative conservative management. Patient presented to [facility] after being medically cleared for an elective surgical procedure. The patient was taken to the operating room on date mentioned above. Prophylactic antibiotics were started before the procedure and continued for 24 hours. There were no complications during the procedure and patient tolerated the procedure well. The patient was transferred to the recovery room in stable condition and subsequently to the surgical floor. The patient was placed on Home Eliquis for anticoagulation while in house. All home medications were continued. The patient received physical therapy daily and daily labs were followed. The dressing was kept clean, dry, intact. The rest of the hospital stay was unremarkable.

## 2021-09-07 NOTE — BRIEF OPERATIVE NOTE - NSICDXBRIEFPOSTOP_GEN_ALL_CORE_FT
POST-OP DIAGNOSIS:  Arthritis of right shoulder region 07-Sep-2021 13:10:11  Eliceo Wyman  Rotator cuff tear arthropathy, right 07-Sep-2021 13:10:06  Eliceo Wyman

## 2021-09-08 ENCOUNTER — TRANSCRIPTION ENCOUNTER (OUTPATIENT)
Age: 76
End: 2021-09-08

## 2021-09-08 VITALS — SYSTOLIC BLOOD PRESSURE: 113 MMHG | DIASTOLIC BLOOD PRESSURE: 76 MMHG

## 2021-09-08 LAB
ANION GAP SERPL CALC-SCNC: 9 MMOL/L — SIGNIFICANT CHANGE UP (ref 5–17)
BUN SERPL-MCNC: 9 MG/DL — SIGNIFICANT CHANGE UP (ref 7–23)
CALCIUM SERPL-MCNC: 8.4 MG/DL — LOW (ref 8.5–10.1)
CHLORIDE SERPL-SCNC: 100 MMOL/L — SIGNIFICANT CHANGE UP (ref 96–108)
CO2 SERPL-SCNC: 29 MMOL/L — SIGNIFICANT CHANGE UP (ref 22–31)
COVID-19 SPIKE DOMAIN AB INTERP: POSITIVE
COVID-19 SPIKE DOMAIN ANTIBODY RESULT: 61 U/ML — HIGH
CREAT SERPL-MCNC: 0.85 MG/DL — SIGNIFICANT CHANGE UP (ref 0.5–1.3)
GLUCOSE SERPL-MCNC: 101 MG/DL — HIGH (ref 70–99)
HCT VFR BLD CALC: 28.8 % — LOW (ref 34.5–45)
HGB BLD-MCNC: 9 G/DL — LOW (ref 11.5–15.5)
MCHC RBC-ENTMCNC: 27.4 PG — SIGNIFICANT CHANGE UP (ref 27–34)
MCHC RBC-ENTMCNC: 31.3 GM/DL — LOW (ref 32–36)
MCV RBC AUTO: 87.8 FL — SIGNIFICANT CHANGE UP (ref 80–100)
NRBC # BLD: 0 /100 WBCS — SIGNIFICANT CHANGE UP (ref 0–0)
PLATELET # BLD AUTO: 223 K/UL — SIGNIFICANT CHANGE UP (ref 150–400)
POTASSIUM SERPL-MCNC: 3.3 MMOL/L — LOW (ref 3.5–5.3)
POTASSIUM SERPL-SCNC: 3.3 MMOL/L — LOW (ref 3.5–5.3)
RBC # BLD: 3.28 M/UL — LOW (ref 3.8–5.2)
RBC # FLD: 16.6 % — HIGH (ref 10.3–14.5)
SARS-COV-2 IGG+IGM SERPL QL IA: 61 U/ML — HIGH
SARS-COV-2 IGG+IGM SERPL QL IA: POSITIVE
SODIUM SERPL-SCNC: 138 MMOL/L — SIGNIFICANT CHANGE UP (ref 135–145)
WBC # BLD: 6.49 K/UL — SIGNIFICANT CHANGE UP (ref 3.8–10.5)
WBC # FLD AUTO: 6.49 K/UL — SIGNIFICANT CHANGE UP (ref 3.8–10.5)

## 2021-09-08 PROCEDURE — 85610 PROTHROMBIN TIME: CPT

## 2021-09-08 PROCEDURE — 36415 COLL VENOUS BLD VENIPUNCTURE: CPT

## 2021-09-08 PROCEDURE — 85027 COMPLETE CBC AUTOMATED: CPT

## 2021-09-08 PROCEDURE — 97162 PT EVAL MOD COMPLEX 30 MIN: CPT

## 2021-09-08 PROCEDURE — 97165 OT EVAL LOW COMPLEX 30 MIN: CPT

## 2021-09-08 PROCEDURE — 73030 X-RAY EXAM OF SHOULDER: CPT

## 2021-09-08 PROCEDURE — 88305 TISSUE EXAM BY PATHOLOGIST: CPT

## 2021-09-08 PROCEDURE — 85730 THROMBOPLASTIN TIME PARTIAL: CPT

## 2021-09-08 PROCEDURE — 88311 DECALCIFY TISSUE: CPT

## 2021-09-08 PROCEDURE — 80048 BASIC METABOLIC PNL TOTAL CA: CPT

## 2021-09-08 PROCEDURE — C1713: CPT

## 2021-09-08 PROCEDURE — 86769 SARS-COV-2 COVID-19 ANTIBODY: CPT

## 2021-09-08 PROCEDURE — 23472 RECONSTRUCT SHOULDER JOINT: CPT

## 2021-09-08 PROCEDURE — C1776: CPT

## 2021-09-08 RX ORDER — POTASSIUM CHLORIDE 20 MEQ
40 PACKET (EA) ORAL ONCE
Refills: 0 | Status: COMPLETED | OUTPATIENT
Start: 2021-09-08 | End: 2021-09-08

## 2021-09-08 RX ORDER — ACETAMINOPHEN 500 MG
1000 TABLET ORAL ONCE
Refills: 0 | Status: COMPLETED | OUTPATIENT
Start: 2021-09-08 | End: 2021-09-08

## 2021-09-08 RX ORDER — ACETAMINOPHEN 500 MG
1000 TABLET ORAL ONCE
Refills: 0 | Status: DISCONTINUED | OUTPATIENT
Start: 2021-09-08 | End: 2021-09-08

## 2021-09-08 RX ADMIN — APIXABAN 5 MILLIGRAM(S): 2.5 TABLET, FILM COATED ORAL at 05:45

## 2021-09-08 RX ADMIN — OXYCODONE HYDROCHLORIDE 5 MILLIGRAM(S): 5 TABLET ORAL at 01:14

## 2021-09-08 RX ADMIN — Medication 400 MILLIGRAM(S): at 05:50

## 2021-09-08 RX ADMIN — Medication 40 MILLIEQUIVALENT(S): at 09:43

## 2021-09-08 RX ADMIN — DRONEDARONE 200 MILLIGRAM(S): 400 TABLET, FILM COATED ORAL at 08:17

## 2021-09-08 RX ADMIN — Medication 25 MICROGRAM(S): at 05:45

## 2021-09-08 RX ADMIN — Medication 650 MILLIGRAM(S): at 00:21

## 2021-09-08 RX ADMIN — OXYCODONE HYDROCHLORIDE 5 MILLIGRAM(S): 5 TABLET ORAL at 02:20

## 2021-09-08 NOTE — DISCHARGE NOTE NURSING/CASE MANAGEMENT/SOCIAL WORK - PATIENT PORTAL LINK FT
You can access the FollowMyHealth Patient Portal offered by [facility] by registering at the following website: [email or web address]. By joining [facility]’s FollowMyHealth portal, you will also be able to view your health information using other applications (apps) compatible with our system.

## 2021-09-08 NOTE — PROGRESS NOTE ADULT - SUBJECTIVE AND OBJECTIVE BOX
Patient seen and examined at bedside. Pain well controlled with medication. Patient denies any numbness, tingling, weakness, or any other orthopaedic complaint.     Exam:   T(C): 36.7 (09-07-21 @ 19:45), Max: 36.9 (09-07-21 @ 13:37)  T(F): 98 (09-07-21 @ 19:45), Max: 98.4 (09-07-21 @ 13:37)  HR: 75 (09-08-21 @ 05:14) (60 - 85)  BP: 99/65 (09-08-21 @ 05:14) (99/65 - 140/77)  RR: 16 (09-08-21 @ 05:14) (11 - 18)  SpO2: 96% (09-08-21 @ 05:14) (95% - 100%)    Gen: Well appearing, no acute distress   RLE:  Extremity in sling with aquacel over deltopec incision, c/d/i. Skin intact. No edema, erythema, or lesions of the skin. No visualized deformity.   Radha-incisional TTP; otherwise, NTTP throughout the rest of the extremity.   SILT C5-T1  Msc/rad/med/uln/ain/pin intact. Ax unable to assess secondary to sling.   Radial pulse palpable.   No calf tenderness bilaterally.  Compartments soft and compressible.     Laboratory Results:   CBC, 09-07-21 @ 13:29    WBC: 8.30  Hgb: 9.5  Hct: 29.5  Plt: 231      Chemistry, 09-07-21 @ 13:29    Na: 139     AST: --  K: 3.2       ALT: --  Cl: 103      TProt: --  CO2: 32     Alb: --  BUN: 13     TBili: --  Cr: 0.90      AP: --  Glu: 119       Mg: --  P: --    eGFR: 71  eGFR, AA: 61        Imaging: ***

## 2021-09-08 NOTE — PROGRESS NOTE ADULT - SUBJECTIVE AND OBJECTIVE BOX
The patient was interviewed and evaluated.    76y Female    T(C): 36.7 (09-08-21 @ 07:51), Max: 36.9 (09-07-21 @ 13:37)  HR: 73 (09-08-21 @ 07:51) (60 - 85)  BP: 113/76 (09-08-21 @ 08:22) (99/65 - 140/77)  RR: 18 (09-08-21 @ 07:51) (11 - 18)  SpO2: 92% (09-08-21 @ 07:51) (92% - 100%)  Wt(kg): --    No Nausea/vomiting, recall, sore throat or headache.    No anesthesia related complaints or sequelae.

## 2021-09-08 NOTE — OCCUPATIONAL THERAPY INITIAL EVALUATION ADULT - ADL RETRAINING, OT EVAL
Patient will dress lower body Independently post setup within 1-2 sessions. Pt will dress UB with Rozina and vc's for sequencing compensatory techniques within 1-2 sessions.

## 2021-09-08 NOTE — OCCUPATIONAL THERAPY INITIAL EVALUATION ADULT - PERSONAL SAFETY AND JUDGMENT, REHAB EVAL
vc's for pacing to improve safety; needs reinforcement of NWB RUE and ROM restrictions/at risk behaviors demonstrated

## 2021-09-08 NOTE — OCCUPATIONAL THERAPY INITIAL EVALUATION ADULT - ADDITIONAL COMMENTS
Pt lives with her  in a private home, 2 steps inside. Bathroom has a stall shower. PTA pt was independent with ADLs/IADLs and functional mobility without AD.

## 2021-09-08 NOTE — PROGRESS NOTE ADULT - ASSESSMENT
76F POD1 R rTSA    Plan:   NWB RUE in sling/PT/OT  Pain management PRN  DVT prophylaxis: continue home eliquis  Incentive spirometry  No acute orthopaedic surgical intervention indicated at this time. This patient is orthopaedically stable for discharge.   Patient to follow up with Dr. Castillo as an outpatient for further evaluation and management.   All of the patient's questions and concerns were answered and addressed.   Will discuss with Dr. Castillo, and will advise for any changes to the plan.

## 2021-09-08 NOTE — OCCUPATIONAL THERAPY INITIAL EVALUATION ADULT - RANGE OF MOTION EXAMINATION, UPPER EXTREMITY
Right elbow/wrist/digits WFL; shoulder not assessed due to sling and no ROM per MD. Pt is right hand dominant./Left UE Active ROM was WFL (within functional limits)

## 2021-09-08 NOTE — OCCUPATIONAL THERAPY INITIAL EVALUATION ADULT - PERTINENT HX OF CURRENT PROBLEM, REHAB EVAL
75 yo F s/p right reverse total shoulder arthroplasty 9/7/21 c/o pain right shoulder >1 year no specific injury.

## 2021-09-09 LAB — SURGICAL PATHOLOGY STUDY: SIGNIFICANT CHANGE UP

## 2022-04-13 ENCOUNTER — OUTPATIENT (OUTPATIENT)
Dept: OUTPATIENT SERVICES | Facility: HOSPITAL | Age: 77
LOS: 1 days | End: 2022-04-13
Payer: MEDICARE

## 2022-04-13 VITALS
RESPIRATION RATE: 16 BRPM | HEART RATE: 84 BPM | OXYGEN SATURATION: 100 % | HEIGHT: 64 IN | SYSTOLIC BLOOD PRESSURE: 106 MMHG | WEIGHT: 138.89 LBS | TEMPERATURE: 99 F | DIASTOLIC BLOOD PRESSURE: 79 MMHG

## 2022-04-13 DIAGNOSIS — Z98.890 OTHER SPECIFIED POSTPROCEDURAL STATES: Chronic | ICD-10-CM

## 2022-04-13 DIAGNOSIS — Z90.711 ACQUIRED ABSENCE OF UTERUS WITH REMAINING CERVICAL STUMP: Chronic | ICD-10-CM

## 2022-04-13 DIAGNOSIS — Z01.818 ENCOUNTER FOR OTHER PREPROCEDURAL EXAMINATION: ICD-10-CM

## 2022-04-13 DIAGNOSIS — M43.16 SPONDYLOLISTHESIS, LUMBAR REGION: ICD-10-CM

## 2022-04-13 DIAGNOSIS — Z98.1 ARTHRODESIS STATUS: Chronic | ICD-10-CM

## 2022-04-13 DIAGNOSIS — Z90.89 ACQUIRED ABSENCE OF OTHER ORGANS: Chronic | ICD-10-CM

## 2022-04-13 DIAGNOSIS — M48.061 SPINAL STENOSIS, LUMBAR REGION WITHOUT NEUROGENIC CLAUDICATION: ICD-10-CM

## 2022-04-13 DIAGNOSIS — Z90.49 ACQUIRED ABSENCE OF OTHER SPECIFIED PARTS OF DIGESTIVE TRACT: Chronic | ICD-10-CM

## 2022-04-13 LAB
ALBUMIN SERPL ELPH-MCNC: 3.9 G/DL — SIGNIFICANT CHANGE UP (ref 3.3–5)
ALP SERPL-CCNC: 73 U/L — SIGNIFICANT CHANGE UP (ref 40–120)
ALT FLD-CCNC: 25 U/L — SIGNIFICANT CHANGE UP (ref 12–78)
ANION GAP SERPL CALC-SCNC: 3 MMOL/L — LOW (ref 5–17)
APPEARANCE UR: CLEAR — SIGNIFICANT CHANGE UP
APTT BLD: 47.8 SEC — HIGH (ref 27.5–35.5)
AST SERPL-CCNC: 25 U/L — SIGNIFICANT CHANGE UP (ref 15–37)
BASOPHILS # BLD AUTO: 0.07 K/UL — SIGNIFICANT CHANGE UP (ref 0–0.2)
BASOPHILS NFR BLD AUTO: 1.2 % — SIGNIFICANT CHANGE UP (ref 0–2)
BILIRUB SERPL-MCNC: 0.7 MG/DL — SIGNIFICANT CHANGE UP (ref 0.2–1.2)
BILIRUB UR-MCNC: NEGATIVE — SIGNIFICANT CHANGE UP
BUN SERPL-MCNC: 22 MG/DL — SIGNIFICANT CHANGE UP (ref 7–23)
CALCIUM SERPL-MCNC: 10.5 MG/DL — HIGH (ref 8.5–10.1)
CHLORIDE SERPL-SCNC: 104 MMOL/L — SIGNIFICANT CHANGE UP (ref 96–108)
CO2 SERPL-SCNC: 30 MMOL/L — SIGNIFICANT CHANGE UP (ref 22–31)
COLOR SPEC: YELLOW — SIGNIFICANT CHANGE UP
CREAT SERPL-MCNC: 1.03 MG/DL — SIGNIFICANT CHANGE UP (ref 0.5–1.3)
DIFF PNL FLD: ABNORMAL
EGFR: 56 ML/MIN/1.73M2 — LOW
EOSINOPHIL # BLD AUTO: 0.13 K/UL — SIGNIFICANT CHANGE UP (ref 0–0.5)
EOSINOPHIL NFR BLD AUTO: 2.2 % — SIGNIFICANT CHANGE UP (ref 0–6)
GLUCOSE SERPL-MCNC: 87 MG/DL — SIGNIFICANT CHANGE UP (ref 70–99)
GLUCOSE UR QL: NEGATIVE — SIGNIFICANT CHANGE UP
HCT VFR BLD CALC: 43.6 % — SIGNIFICANT CHANGE UP (ref 34.5–45)
HGB BLD-MCNC: 13.5 G/DL — SIGNIFICANT CHANGE UP (ref 11.5–15.5)
IMM GRANULOCYTES NFR BLD AUTO: 0.5 % — SIGNIFICANT CHANGE UP (ref 0–1.5)
INR BLD: 1.94 RATIO — HIGH (ref 0.88–1.16)
KETONES UR-MCNC: NEGATIVE — SIGNIFICANT CHANGE UP
LEUKOCYTE ESTERASE UR-ACNC: NEGATIVE — SIGNIFICANT CHANGE UP
LYMPHOCYTES # BLD AUTO: 0.36 K/UL — LOW (ref 1–3.3)
LYMPHOCYTES # BLD AUTO: 6 % — LOW (ref 13–44)
MCHC RBC-ENTMCNC: 27.1 PG — SIGNIFICANT CHANGE UP (ref 27–34)
MCHC RBC-ENTMCNC: 31 GM/DL — LOW (ref 32–36)
MCV RBC AUTO: 87.4 FL — SIGNIFICANT CHANGE UP (ref 80–100)
MONOCYTES # BLD AUTO: 0.61 K/UL — SIGNIFICANT CHANGE UP (ref 0–0.9)
MONOCYTES NFR BLD AUTO: 10.1 % — SIGNIFICANT CHANGE UP (ref 2–14)
MRSA PCR RESULT.: SIGNIFICANT CHANGE UP
NEUTROPHILS # BLD AUTO: 4.84 K/UL — SIGNIFICANT CHANGE UP (ref 1.8–7.4)
NEUTROPHILS NFR BLD AUTO: 80 % — HIGH (ref 43–77)
NITRITE UR-MCNC: NEGATIVE — SIGNIFICANT CHANGE UP
PH UR: 6 — SIGNIFICANT CHANGE UP (ref 5–8)
PLATELET # BLD AUTO: 228 K/UL — SIGNIFICANT CHANGE UP (ref 150–400)
POTASSIUM SERPL-MCNC: 3.9 MMOL/L — SIGNIFICANT CHANGE UP (ref 3.5–5.3)
POTASSIUM SERPL-SCNC: 3.9 MMOL/L — SIGNIFICANT CHANGE UP (ref 3.5–5.3)
PROT SERPL-MCNC: 7.4 GM/DL — SIGNIFICANT CHANGE UP (ref 6–8.3)
PROT UR-MCNC: NEGATIVE — SIGNIFICANT CHANGE UP
PROTHROM AB SERPL-ACNC: 22.7 SEC — HIGH (ref 10.5–13.4)
RBC # BLD: 4.99 M/UL — SIGNIFICANT CHANGE UP (ref 3.8–5.2)
RBC # FLD: 24.8 % — HIGH (ref 10.3–14.5)
S AUREUS DNA NOSE QL NAA+PROBE: DETECTED
SODIUM SERPL-SCNC: 137 MMOL/L — SIGNIFICANT CHANGE UP (ref 135–145)
SP GR SPEC: 1.01 — SIGNIFICANT CHANGE UP (ref 1.01–1.02)
UROBILINOGEN FLD QL: NEGATIVE — SIGNIFICANT CHANGE UP
WBC # BLD: 6.04 K/UL — SIGNIFICANT CHANGE UP (ref 3.8–10.5)
WBC # FLD AUTO: 6.04 K/UL — SIGNIFICANT CHANGE UP (ref 3.8–10.5)

## 2022-04-13 PROCEDURE — 36415 COLL VENOUS BLD VENIPUNCTURE: CPT

## 2022-04-13 PROCEDURE — 86850 RBC ANTIBODY SCREEN: CPT

## 2022-04-13 PROCEDURE — 81001 URINALYSIS AUTO W/SCOPE: CPT

## 2022-04-13 PROCEDURE — 71046 X-RAY EXAM CHEST 2 VIEWS: CPT | Mod: 26

## 2022-04-13 PROCEDURE — 83036 HEMOGLOBIN GLYCOSYLATED A1C: CPT

## 2022-04-13 PROCEDURE — 87640 STAPH A DNA AMP PROBE: CPT

## 2022-04-13 PROCEDURE — 93010 ELECTROCARDIOGRAM REPORT: CPT

## 2022-04-13 PROCEDURE — 86901 BLOOD TYPING SEROLOGIC RH(D): CPT

## 2022-04-13 PROCEDURE — 93005 ELECTROCARDIOGRAM TRACING: CPT

## 2022-04-13 PROCEDURE — 85610 PROTHROMBIN TIME: CPT

## 2022-04-13 PROCEDURE — 86900 BLOOD TYPING SEROLOGIC ABO: CPT

## 2022-04-13 PROCEDURE — 85730 THROMBOPLASTIN TIME PARTIAL: CPT

## 2022-04-13 PROCEDURE — 85025 COMPLETE CBC W/AUTO DIFF WBC: CPT

## 2022-04-13 PROCEDURE — 87641 MR-STAPH DNA AMP PROBE: CPT

## 2022-04-13 PROCEDURE — G0463: CPT | Mod: 25

## 2022-04-13 PROCEDURE — 71046 X-RAY EXAM CHEST 2 VIEWS: CPT

## 2022-04-13 PROCEDURE — 87086 URINE CULTURE/COLONY COUNT: CPT

## 2022-04-13 PROCEDURE — 80053 COMPREHEN METABOLIC PANEL: CPT

## 2022-04-13 RX ORDER — LEVOTHYROXINE SODIUM 125 MCG
1 TABLET ORAL
Qty: 0 | Refills: 0 | DISCHARGE

## 2022-04-13 RX ORDER — MERCAPTOPURINE 50 MG/1
2 TABLET ORAL
Qty: 0 | Refills: 0 | DISCHARGE

## 2022-04-13 RX ORDER — PSYLLIUM SEED (WITH DEXTROSE)
0 POWDER (GRAM) ORAL
Qty: 0 | Refills: 0 | DISCHARGE

## 2022-04-13 RX ORDER — ESCITALOPRAM OXALATE 10 MG/1
1 TABLET, FILM COATED ORAL
Qty: 0 | Refills: 0 | DISCHARGE

## 2022-04-13 NOTE — H&P PST ADULT - RS GEN PE MLT RESP DETAILS PC
March 7, 2022  Genie Persaud  [address] [place] [id number]    [facility]        Thank you for talking with me on Mar 7, 2022 about your health and medications. As a follow-up to our conversation, I have included two documents:      1. Your Recommended To-Do List has steps you should take to get the best results from your medications.  2. Your Medication List will help you keep track of your medications and how to take them.    If you want to talk about these documents, please call Reina Mason RPH at phone: [phone], Monday-Friday 8-4:30pm.    I look forward to working with you and your doctors to make sure your medications work well for you.    Sincerely,    Reina Mason RPH   clear to auscultation bilaterally

## 2022-04-13 NOTE — H&P PST ADULT - HISTORY OF PRESENT ILLNESS
77 yo F for right reverse total shoulder arthroplasty  9/7/21   c/o pain  right shoulder > 1 year  no specific injury  Hx arthritis  takes tylenol for pain w some  relief   76 year old female diagnosed with lumbar stenosis, spondylolisthesis, scoliosis; c/o back pain x 1 month and sciatica radiating to right leg; she presents to PST for planned Exploration of previous spinal fusion lumbar laminectomy  right sided transforaminal interbody fusion correction of scoliosis

## 2022-04-13 NOTE — H&P PST ADULT - ASSESSMENT
76 year old female diagnosed with lumbar stenosis, spondylolisthesis, scoliosis; c/o back pain x 1 month and sciatica radiating to right leg; she presents to PST for planned Exploration of previous spinal fusion lumbar laminectomy  right sided transforaminal interbody fusion correction of scoliosis     Plan:  1. PST instructions given ; NPO status/  instructions to be given by ASU   2. Pt instructed to take following meds on day of surgery: metoprolol synthroid multaq   3. Pt instructed to take routine evening medications unless indicated   4. Stop NSAIDS ( Aspirin Alev Motrin Mobic Diclofenac), herbal supplements , MVI , Vitamin fish oil 7 days prior to surgery  unless   directed by surgeon or cardiologist;   5. Medical Optimization  with Dr Negron  6. EZ wash instructions given & mupirocin instructions given  7. Labs EKG CXR as per surgeon request   8. Pt instructed to self quarantine after Covid test   9. Covid Testing scheduled Pt notified and aware  10. Pt denies covid symptoms shortness of breath fever cough       CAPRINI SCORE [CLOT]    AGE RELATED RISK FACTORS                                                       MOBILITY RELATED FACTORS  [ ] Age 41-60 years                                            (1 Point)                  [ ] Bed rest                                                        (1 Point)  [ ] Age: 61-74 years                                           (2 Points)                 [ ] Plaster cast                                                   (2 Points)  [x ] Age= 75 years                                              (3 Points)                 [ ] Bed bound for more than 72 hours                 (2 Points)    DISEASE RELATED RISK FACTORS                                               GENDER SPECIFIC FACTORS  [ ] Edema in the lower extremities                       (1 Point)                  [ ] Pregnancy                                                     (1 Point)  [ ] Varicose veins                                               (1 Point)                  [ ] Post-partum < 6 weeks                                   (1 Point)             [ ] BMI > 25 Kg/m2                                            (1 Point)                  [ ] Hormonal therapy  or oral contraception          (1 Point)                 [ ] Sepsis (in the previous month)                        (1 Point)                  [ ] History of pregnancy complications                 (1 point)  [ ] Pneumonia or serious lung disease                                               [ ] Unexplained or recurrent                     (1 Point)           (in the previous month)                               (1 Point)  [ ] Abnormal pulmonary function test                     (1 Point)                 SURGERY RELATED RISK FACTORS  [ ] Acute myocardial infarction                              (1 Point)                 [ ]  Section                                             (1 Point)  [ ] Congestive heart failure (in the previous month)  (1 Point)               [ ] Minor surgery                                                  (1 Point)   [ ] Inflammatory bowel disease                             (1 Point)                 [ ] Arthroscopic surgery                                        (2 Points)  [ ] Central venous access                                      (2 Points)                [ x] General surgery lasting more than 45 minutes   (2 Points)       [ ] Stroke (in the previous month)                          (5 Points)               [ ] Elective arthroplasty                                         (5 Points)            ( ) past and present malignancy                             ( 2 points)                                                                                                                                    HEMATOLOGY RELATED FACTORS                                                 TRAUMA RELATED RISK FACTORS  [ ] Prior episodes of VTE                                     (3 Points)                 [ ] Fracture of the hip, pelvis, or leg                       (5 Points)  [ ] Positive family history for VTE                         (3 Points)                 [ ] Acute spinal cord injury (in the previous month)  (5 Points)  [ ] Prothrombin 01119 A                                     (3 Points)                 [ ] Paralysis  (less than 1 month)                             (5 Points)  [ ] Factor V Leiden                                             (3 Points)                  [ ] Multiple Trauma within 1 month                        (5 Points)  [ ] Lupus anticoagulants                                     (3 Points)                                                           [ ] Anticardiolipin antibodies                               (3 Points)                                                       [ ] High homocysteine in the blood                      (3 Points)                                             [ ] Other congenital or acquired thrombophilia      (3 Points)                                                [ ] Heparin induced thrombocytopenia                  (3 Points)                                          Total Score [     5     ]    The Caprini score indicates this patient is at risk for a VTE event (score 3-5).  Most surgical patients in this group would benefit from pharmacologic prophylaxis.  The surgical team will determine the balance between VTE risk and bleeding risk

## 2022-04-13 NOTE — H&P PST ADULT - NSICDXPASTSURGICALHX_GEN_ALL_CORE_FT
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

## 2022-04-13 NOTE — H&P PST ADULT - NSICDXPASTMEDICALHX_GEN_ALL_CORE_FT
PAST MEDICAL HISTORY:  Atrial fibrillation     Depression     History of osteoarthritis     Hypertension     Hypothyroid     Lumbar disc disorder     Osteoporosis     Scoliosis     Spinal stenosis     Spondylolisthesis     Ulcerative colitis signs of precancer had partial colectomy

## 2022-04-13 NOTE — H&P PST ADULT - HEALTH CARE MAINTENANCE
Pt denies travel out of [facility] for the past 14 days Pt denies  travel internationally for the past 21 days

## 2022-04-14 DIAGNOSIS — M43.16 SPONDYLOLISTHESIS, LUMBAR REGION: ICD-10-CM

## 2022-04-14 DIAGNOSIS — Z01.818 ENCOUNTER FOR OTHER PREPROCEDURAL EXAMINATION: ICD-10-CM

## 2022-04-14 DIAGNOSIS — M48.061 SPINAL STENOSIS, LUMBAR REGION WITHOUT NEUROGENIC CLAUDICATION: ICD-10-CM

## 2022-04-14 LAB
A1C WITH ESTIMATED AVERAGE GLUCOSE RESULT: 5.2 % — SIGNIFICANT CHANGE UP (ref 4–5.6)
CULTURE RESULTS: SIGNIFICANT CHANGE UP
ESTIMATED AVERAGE GLUCOSE: 103 MG/DL — SIGNIFICANT CHANGE UP (ref 68–114)
SPECIMEN SOURCE: SIGNIFICANT CHANGE UP

## 2022-04-14 NOTE — CHART NOTE - NSCHARTNOTEFT_GEN_A_CORE
Instructed patient to use mupirocin ointment from April 15-19, 2022 2x/day pt positive for MSSA; pt said she understood instructions

## 2022-04-19 RX ORDER — SODIUM CHLORIDE 9 MG/ML
3 INJECTION INTRAMUSCULAR; INTRAVENOUS; SUBCUTANEOUS EVERY 8 HOURS
Refills: 0 | Status: DISCONTINUED | OUTPATIENT
Start: 2022-04-20 | End: 2022-04-23

## 2022-04-20 ENCOUNTER — TRANSCRIPTION ENCOUNTER (OUTPATIENT)
Age: 77
End: 2022-04-20

## 2022-04-20 ENCOUNTER — INPATIENT (INPATIENT)
Facility: HOSPITAL | Age: 77
LOS: 3 days | Discharge: HOME | DRG: 457 | End: 2022-04-24
Attending: ORTHOPAEDIC SURGERY | Admitting: ORTHOPAEDIC SURGERY
Payer: MEDICARE

## 2022-04-20 VITALS
HEIGHT: 64 IN | OXYGEN SATURATION: 100 % | TEMPERATURE: 97 F | SYSTOLIC BLOOD PRESSURE: 106 MMHG | RESPIRATION RATE: 15 BRPM | HEART RATE: 70 BPM | DIASTOLIC BLOOD PRESSURE: 74 MMHG | WEIGHT: 138.89 LBS

## 2022-04-20 DIAGNOSIS — Z98.890 OTHER SPECIFIED POSTPROCEDURAL STATES: Chronic | ICD-10-CM

## 2022-04-20 DIAGNOSIS — Z90.89 ACQUIRED ABSENCE OF OTHER ORGANS: Chronic | ICD-10-CM

## 2022-04-20 DIAGNOSIS — M48.061 SPINAL STENOSIS, LUMBAR REGION WITHOUT NEUROGENIC CLAUDICATION: ICD-10-CM

## 2022-04-20 DIAGNOSIS — M43.16 SPONDYLOLISTHESIS, LUMBAR REGION: ICD-10-CM

## 2022-04-20 DIAGNOSIS — Z90.711 ACQUIRED ABSENCE OF UTERUS WITH REMAINING CERVICAL STUMP: Chronic | ICD-10-CM

## 2022-04-20 DIAGNOSIS — Z98.1 ARTHRODESIS STATUS: Chronic | ICD-10-CM

## 2022-04-20 DIAGNOSIS — M47.816 SPONDYLOSIS WITHOUT MYELOPATHY OR RADICULOPATHY, LUMBAR REGION: ICD-10-CM

## 2022-04-20 DIAGNOSIS — Z90.49 ACQUIRED ABSENCE OF OTHER SPECIFIED PARTS OF DIGESTIVE TRACT: Chronic | ICD-10-CM

## 2022-04-20 LAB
ANION GAP SERPL CALC-SCNC: 8 MMOL/L — SIGNIFICANT CHANGE UP (ref 5–17)
BASOPHILS # BLD AUTO: 0.06 K/UL — SIGNIFICANT CHANGE UP (ref 0–0.2)
BASOPHILS NFR BLD AUTO: 0.7 % — SIGNIFICANT CHANGE UP (ref 0–2)
BUN SERPL-MCNC: 19 MG/DL — SIGNIFICANT CHANGE UP (ref 7–23)
CALCIUM SERPL-MCNC: 8.9 MG/DL — SIGNIFICANT CHANGE UP (ref 8.5–10.1)
CHLORIDE SERPL-SCNC: 108 MMOL/L — SIGNIFICANT CHANGE UP (ref 96–108)
CO2 SERPL-SCNC: 18 MMOL/L — LOW (ref 22–31)
CREAT SERPL-MCNC: 0.99 MG/DL — SIGNIFICANT CHANGE UP (ref 0.5–1.3)
EGFR: 58 ML/MIN/1.73M2 — LOW
EOSINOPHIL # BLD AUTO: 0.13 K/UL — SIGNIFICANT CHANGE UP (ref 0–0.5)
EOSINOPHIL NFR BLD AUTO: 1.4 % — SIGNIFICANT CHANGE UP (ref 0–6)
GLUCOSE SERPL-MCNC: 85 MG/DL — SIGNIFICANT CHANGE UP (ref 70–99)
HCT VFR BLD CALC: 38.3 % — SIGNIFICANT CHANGE UP (ref 34.5–45)
HGB BLD-MCNC: 11.9 G/DL — SIGNIFICANT CHANGE UP (ref 11.5–15.5)
IMM GRANULOCYTES NFR BLD AUTO: 1.5 % — SIGNIFICANT CHANGE UP (ref 0–1.5)
LYMPHOCYTES # BLD AUTO: 0.42 K/UL — LOW (ref 1–3.3)
LYMPHOCYTES # BLD AUTO: 4.6 % — LOW (ref 13–44)
MCHC RBC-ENTMCNC: 27.4 PG — SIGNIFICANT CHANGE UP (ref 27–34)
MCHC RBC-ENTMCNC: 31.1 GM/DL — LOW (ref 32–36)
MCV RBC AUTO: 88.2 FL — SIGNIFICANT CHANGE UP (ref 80–100)
MONOCYTES # BLD AUTO: 0.87 K/UL — SIGNIFICANT CHANGE UP (ref 0–0.9)
MONOCYTES NFR BLD AUTO: 9.5 % — SIGNIFICANT CHANGE UP (ref 2–14)
NEUTROPHILS # BLD AUTO: 7.58 K/UL — HIGH (ref 1.8–7.4)
NEUTROPHILS NFR BLD AUTO: 82.3 % — HIGH (ref 43–77)
PLATELET # BLD AUTO: 171 K/UL — SIGNIFICANT CHANGE UP (ref 150–400)
POTASSIUM SERPL-MCNC: 3.4 MMOL/L — LOW (ref 3.5–5.3)
POTASSIUM SERPL-SCNC: 3.4 MMOL/L — LOW (ref 3.5–5.3)
RBC # BLD: 4.34 M/UL — SIGNIFICANT CHANGE UP (ref 3.8–5.2)
RBC # FLD: 23.8 % — HIGH (ref 10.3–14.5)
SODIUM SERPL-SCNC: 134 MMOL/L — LOW (ref 135–145)
WBC # BLD: 9.2 K/UL — SIGNIFICANT CHANGE UP (ref 3.8–10.5)
WBC # FLD AUTO: 9.2 K/UL — SIGNIFICANT CHANGE UP (ref 3.8–10.5)

## 2022-04-20 PROCEDURE — 97116 GAIT TRAINING THERAPY: CPT | Mod: GP

## 2022-04-20 PROCEDURE — 76000 FLUOROSCOPY <1 HR PHYS/QHP: CPT

## 2022-04-20 PROCEDURE — 80048 BASIC METABOLIC PNL TOTAL CA: CPT

## 2022-04-20 PROCEDURE — C1889: CPT

## 2022-04-20 PROCEDURE — 85025 COMPLETE CBC W/AUTO DIFF WBC: CPT

## 2022-04-20 PROCEDURE — 82962 GLUCOSE BLOOD TEST: CPT

## 2022-04-20 PROCEDURE — 86891 AUTOLOGOUS BLOOD OP SALVAGE: CPT

## 2022-04-20 PROCEDURE — 36415 COLL VENOUS BLD VENIPUNCTURE: CPT

## 2022-04-20 PROCEDURE — C1713: CPT

## 2022-04-20 PROCEDURE — 97530 THERAPEUTIC ACTIVITIES: CPT | Mod: GP

## 2022-04-20 PROCEDURE — 97162 PT EVAL MOD COMPLEX 30 MIN: CPT | Mod: GP

## 2022-04-20 RX ORDER — FENTANYL CITRATE 50 UG/ML
50 INJECTION INTRAVENOUS
Refills: 0 | Status: DISCONTINUED | OUTPATIENT
Start: 2022-04-20 | End: 2022-04-20

## 2022-04-20 RX ORDER — POTASSIUM CHLORIDE 20 MEQ
40 PACKET (EA) ORAL ONCE
Refills: 0 | Status: COMPLETED | OUTPATIENT
Start: 2022-04-20 | End: 2022-04-20

## 2022-04-20 RX ORDER — ACETAMINOPHEN 500 MG
1000 TABLET ORAL ONCE
Refills: 0 | Status: COMPLETED | OUTPATIENT
Start: 2022-04-21 | End: 2022-04-21

## 2022-04-20 RX ORDER — HYDROMORPHONE HYDROCHLORIDE 2 MG/ML
30 INJECTION INTRAMUSCULAR; INTRAVENOUS; SUBCUTANEOUS
Refills: 0 | Status: DISCONTINUED | OUTPATIENT
Start: 2022-04-20 | End: 2022-04-22

## 2022-04-20 RX ORDER — SODIUM CHLORIDE 9 MG/ML
1000 INJECTION, SOLUTION INTRAVENOUS
Refills: 0 | Status: DISCONTINUED | OUTPATIENT
Start: 2022-04-20 | End: 2022-04-20

## 2022-04-20 RX ORDER — FAMOTIDINE 10 MG/ML
20 INJECTION INTRAVENOUS ONCE
Refills: 0 | Status: COMPLETED | OUTPATIENT
Start: 2022-04-20 | End: 2022-04-20

## 2022-04-20 RX ORDER — CEFAZOLIN SODIUM 1 G
2000 VIAL (EA) INJECTION EVERY 8 HOURS
Refills: 0 | Status: COMPLETED | OUTPATIENT
Start: 2022-04-20 | End: 2022-04-21

## 2022-04-20 RX ORDER — DRONEDARONE 400 MG/1
400 TABLET, FILM COATED ORAL
Refills: 0 | Status: DISCONTINUED | OUTPATIENT
Start: 2022-04-20 | End: 2022-04-24

## 2022-04-20 RX ORDER — DRONEDARONE 400 MG/1
1 TABLET, FILM COATED ORAL
Qty: 0 | Refills: 0 | DISCHARGE

## 2022-04-20 RX ORDER — ONDANSETRON 8 MG/1
4 TABLET, FILM COATED ORAL ONCE
Refills: 0 | Status: DISCONTINUED | OUTPATIENT
Start: 2022-04-20 | End: 2022-04-20

## 2022-04-20 RX ORDER — ONDANSETRON 8 MG/1
4 TABLET, FILM COATED ORAL EVERY 6 HOURS
Refills: 0 | Status: DISCONTINUED | OUTPATIENT
Start: 2022-04-20 | End: 2022-04-24

## 2022-04-20 RX ORDER — TRIAMTERENE/HYDROCHLOROTHIAZID 75 MG-50MG
1 TABLET ORAL DAILY
Refills: 0 | Status: DISCONTINUED | OUTPATIENT
Start: 2022-04-20 | End: 2022-04-22

## 2022-04-20 RX ORDER — ATORVASTATIN CALCIUM 80 MG/1
20 TABLET, FILM COATED ORAL AT BEDTIME
Refills: 0 | Status: DISCONTINUED | OUTPATIENT
Start: 2022-04-20 | End: 2022-04-24

## 2022-04-20 RX ORDER — MEPERIDINE HYDROCHLORIDE 50 MG/ML
12.5 INJECTION INTRAMUSCULAR; INTRAVENOUS; SUBCUTANEOUS
Refills: 0 | Status: DISCONTINUED | OUTPATIENT
Start: 2022-04-20 | End: 2022-04-20

## 2022-04-20 RX ORDER — LANOLIN ALCOHOL/MO/W.PET/CERES
3 CREAM (GRAM) TOPICAL AT BEDTIME
Refills: 0 | Status: DISCONTINUED | OUTPATIENT
Start: 2022-04-20 | End: 2022-04-24

## 2022-04-20 RX ORDER — METOPROLOL TARTRATE 50 MG
50 TABLET ORAL DAILY
Refills: 0 | Status: DISCONTINUED | OUTPATIENT
Start: 2022-04-20 | End: 2022-04-24

## 2022-04-20 RX ORDER — NALOXONE HYDROCHLORIDE 4 MG/.1ML
0.1 SPRAY NASAL
Refills: 0 | Status: DISCONTINUED | OUTPATIENT
Start: 2022-04-20 | End: 2022-04-24

## 2022-04-20 RX ORDER — OXYCODONE HYDROCHLORIDE 5 MG/1
10 TABLET ORAL ONCE
Refills: 0 | Status: DISCONTINUED | OUTPATIENT
Start: 2022-04-20 | End: 2022-04-20

## 2022-04-20 RX ORDER — MERCAPTOPURINE 50 MG/1
50 TABLET ORAL ONCE
Refills: 0 | Status: COMPLETED | OUTPATIENT
Start: 2022-04-20 | End: 2022-04-23

## 2022-04-20 RX ORDER — LEVOTHYROXINE SODIUM 125 MCG
25 TABLET ORAL DAILY
Refills: 0 | Status: DISCONTINUED | OUTPATIENT
Start: 2022-04-20 | End: 2022-04-24

## 2022-04-20 RX ORDER — ACETAMINOPHEN 500 MG
2 TABLET ORAL
Qty: 0 | Refills: 0 | DISCHARGE

## 2022-04-20 RX ORDER — ACETAMINOPHEN 500 MG
1000 TABLET ORAL ONCE
Refills: 0 | Status: COMPLETED | OUTPATIENT
Start: 2022-04-22 | End: 2022-04-22

## 2022-04-20 RX ORDER — METOCLOPRAMIDE HCL 10 MG
10 TABLET ORAL ONCE
Refills: 0 | Status: COMPLETED | OUTPATIENT
Start: 2022-04-20 | End: 2022-04-20

## 2022-04-20 RX ORDER — HYDROMORPHONE HYDROCHLORIDE 2 MG/ML
0.5 INJECTION INTRAMUSCULAR; INTRAVENOUS; SUBCUTANEOUS
Refills: 0 | Status: DISCONTINUED | OUTPATIENT
Start: 2022-04-20 | End: 2022-04-20

## 2022-04-20 RX ADMIN — ATORVASTATIN CALCIUM 20 MILLIGRAM(S): 80 TABLET, FILM COATED ORAL at 21:13

## 2022-04-20 RX ADMIN — FAMOTIDINE 20 MILLIGRAM(S): 10 INJECTION INTRAVENOUS at 11:16

## 2022-04-20 RX ADMIN — Medication 10 MILLIGRAM(S): at 11:17

## 2022-04-20 RX ADMIN — Medication 3 MILLIGRAM(S): at 21:14

## 2022-04-20 RX ADMIN — SODIUM CHLORIDE 3 MILLILITER(S): 9 INJECTION INTRAMUSCULAR; INTRAVENOUS; SUBCUTANEOUS at 21:26

## 2022-04-20 RX ADMIN — Medication 100 MILLIGRAM(S): at 21:14

## 2022-04-20 RX ADMIN — DRONEDARONE 400 MILLIGRAM(S): 400 TABLET, FILM COATED ORAL at 21:13

## 2022-04-20 RX ADMIN — HYDROMORPHONE HYDROCHLORIDE 30 MILLILITER(S): 2 INJECTION INTRAMUSCULAR; INTRAVENOUS; SUBCUTANEOUS at 17:59

## 2022-04-20 RX ADMIN — Medication 40 MILLIEQUIVALENT(S): at 23:21

## 2022-04-20 RX ADMIN — SODIUM CHLORIDE 75 MILLILITER(S): 9 INJECTION, SOLUTION INTRAVENOUS at 18:16

## 2022-04-20 NOTE — PATIENT PROFILE ADULT - FALL HARM RISK - UNIVERSAL INTERVENTIONS
Bed in lowest position, wheels locked, appropriate side rails in place/Call bell, personal items and telephone in reach/Instruct patient to call for assistance before getting out of bed or chair/Non-slip footwear when patient is out of bed/[place] to call system/Physically safe environment - no spills, clutter or unnecessary equipment/Purposeful Proactive Rounding/Room/bathroom lighting operational, light cord in reach

## 2022-04-20 NOTE — BRIEF OPERATIVE NOTE - NSICDXBRIEFPROCEDURE_GEN_ALL_CORE_FT
PROCEDURES:  Posterior fusion of lumbar spine with laminectomy 20-Apr-2022 17:42:57  Misha Spencer

## 2022-04-20 NOTE — BRIEF OPERATIVE NOTE - OPERATION/FINDINGS
L2-5 revision laminectomy with instrumented posterolateral fusion  Right L5 pedicle screw skipped  Fenestrated pedicle screws with cement augmentation at all levels

## 2022-04-20 NOTE — PROGRESS NOTE ADULT - SUBJECTIVE AND OBJECTIVE BOX
Ortho post-op check    Pt seen and examined at bedside, resting comfortably. Tolerated procedure well without complications. During exam patient with some slurring of words but responding appropriately. States her pain has improved from preop. Denies numbness/tingling, chest pain, SOB.    T(C): 36.2 (04-20-22 @ 20:00), Max: 36.2 (04-20-22 @ 11:06)  HR: 70 (04-20-22 @ 20:19) (59 - 77)  BP: 143/79 (04-20-22 @ 20:19) (106/74 - 148/76)  RR: 16 (04-20-22 @ 20:19) (12 - 16)  SpO2: 95% (04-20-22 @ 20:19) (95% - 100%)                          11.9   9.20  )-----------( 171      ( 20 Apr 2022 18:39 )             38.3     20 Apr 2022 18:39    134    |  108    |  19     ----------------------------<  85     3.4     |  18     |  0.99     Ca    8.9        20 Apr 2022 18:39    Physical Exam:  Gen: NAD    Spine Exam:  Dressing CDI  Drain in place    Motor:               C5           C6            C7               C8           T1   R         5/5          5/5            5/5             5/5          5/5  L          5/5          5/5            5/5             5/5          5/5                L2             L3             L4               L5            S1  R         5/5           5/5          5/5             5/5           5/5  L          5/5          5/5           5/5             5/5           5/5    Sensory:            C5         C6         C7      C8       T1        (0=absent, 1=impaired, 2=normal, NT=not testable)  R         2            2           2        2         2  L          2            2           2        2         2               L2          L3         L4      L5       S1         (0=absent, 1=impaired, 2=normal, NT=not testable)  R         2            2            2        2        2  L          2            2           2        2         2      A/P: 76yFemale s/p revision L2-5 laminectomy with instrumented posterolateral spinal fusion POD0    Tolerated procedure well without complications    - Monitor drain output  - Aphasia likely due to PCA and some element of delirium/dementia, will monitor, discussed with critical care team and Dr. Mermelstein  - No flexeril or gabapentin due to increased propensity for delirium  - Pain control  - PT/OT  - WBAT  - DVT prophylaxis (eliquis) may resume when drains discontinued  - Perioperative antibiotics per protocol  - Encourage incentive spirometry, deep breathing exercises  - Will discuss with attending, Dr. Vaca and advise if plan changes    Misha pSencer, DO  PGY-3, Orthopaedic Surgery

## 2022-04-21 LAB
ANION GAP SERPL CALC-SCNC: 6 MMOL/L — SIGNIFICANT CHANGE UP (ref 5–17)
BASOPHILS # BLD AUTO: 0.04 K/UL — SIGNIFICANT CHANGE UP (ref 0–0.2)
BASOPHILS NFR BLD AUTO: 0.4 % — SIGNIFICANT CHANGE UP (ref 0–2)
BUN SERPL-MCNC: 15 MG/DL — SIGNIFICANT CHANGE UP (ref 7–23)
CALCIUM SERPL-MCNC: 8.4 MG/DL — LOW (ref 8.5–10.1)
CHLORIDE SERPL-SCNC: 104 MMOL/L — SIGNIFICANT CHANGE UP (ref 96–108)
CO2 SERPL-SCNC: 25 MMOL/L — SIGNIFICANT CHANGE UP (ref 22–31)
CREAT SERPL-MCNC: 0.82 MG/DL — SIGNIFICANT CHANGE UP (ref 0.5–1.3)
EGFR: 73 ML/MIN/1.73M2 — SIGNIFICANT CHANGE UP
EOSINOPHIL # BLD AUTO: 0 K/UL — SIGNIFICANT CHANGE UP (ref 0–0.5)
EOSINOPHIL NFR BLD AUTO: 0 % — SIGNIFICANT CHANGE UP (ref 0–6)
GLUCOSE SERPL-MCNC: 110 MG/DL — HIGH (ref 70–99)
HCT VFR BLD CALC: 34.5 % — SIGNIFICANT CHANGE UP (ref 34.5–45)
HGB BLD-MCNC: 11 G/DL — LOW (ref 11.5–15.5)
IMM GRANULOCYTES NFR BLD AUTO: 0.6 % — SIGNIFICANT CHANGE UP (ref 0–1.5)
LYMPHOCYTES # BLD AUTO: 0.18 K/UL — LOW (ref 1–3.3)
LYMPHOCYTES # BLD AUTO: 1.6 % — LOW (ref 13–44)
MCHC RBC-ENTMCNC: 28.2 PG — SIGNIFICANT CHANGE UP (ref 27–34)
MCHC RBC-ENTMCNC: 31.9 GM/DL — LOW (ref 32–36)
MCV RBC AUTO: 88.5 FL — SIGNIFICANT CHANGE UP (ref 80–100)
MONOCYTES # BLD AUTO: 0.78 K/UL — SIGNIFICANT CHANGE UP (ref 0–0.9)
MONOCYTES NFR BLD AUTO: 7 % — SIGNIFICANT CHANGE UP (ref 2–14)
NEUTROPHILS # BLD AUTO: 10.09 K/UL — HIGH (ref 1.8–7.4)
NEUTROPHILS NFR BLD AUTO: 90.4 % — HIGH (ref 43–77)
PLATELET # BLD AUTO: 172 K/UL — SIGNIFICANT CHANGE UP (ref 150–400)
POTASSIUM SERPL-MCNC: 3.6 MMOL/L — SIGNIFICANT CHANGE UP (ref 3.5–5.3)
POTASSIUM SERPL-SCNC: 3.6 MMOL/L — SIGNIFICANT CHANGE UP (ref 3.5–5.3)
RBC # BLD: 3.9 M/UL — SIGNIFICANT CHANGE UP (ref 3.8–5.2)
RBC # FLD: 23 % — HIGH (ref 10.3–14.5)
SODIUM SERPL-SCNC: 135 MMOL/L — SIGNIFICANT CHANGE UP (ref 135–145)
WBC # BLD: 11.16 K/UL — HIGH (ref 3.8–10.5)
WBC # FLD AUTO: 11.16 K/UL — HIGH (ref 3.8–10.5)

## 2022-04-21 RX ADMIN — ATORVASTATIN CALCIUM 20 MILLIGRAM(S): 80 TABLET, FILM COATED ORAL at 21:40

## 2022-04-21 RX ADMIN — SODIUM CHLORIDE 3 MILLILITER(S): 9 INJECTION INTRAMUSCULAR; INTRAVENOUS; SUBCUTANEOUS at 23:16

## 2022-04-21 RX ADMIN — SODIUM CHLORIDE 3 MILLILITER(S): 9 INJECTION INTRAMUSCULAR; INTRAVENOUS; SUBCUTANEOUS at 13:31

## 2022-04-21 RX ADMIN — SODIUM CHLORIDE 3 MILLILITER(S): 9 INJECTION INTRAMUSCULAR; INTRAVENOUS; SUBCUTANEOUS at 05:48

## 2022-04-21 RX ADMIN — Medication 400 MILLIGRAM(S): at 21:40

## 2022-04-21 RX ADMIN — DRONEDARONE 400 MILLIGRAM(S): 400 TABLET, FILM COATED ORAL at 21:40

## 2022-04-21 RX ADMIN — Medication 400 MILLIGRAM(S): at 02:19

## 2022-04-21 RX ADMIN — Medication 400 MILLIGRAM(S): at 12:54

## 2022-04-21 RX ADMIN — Medication 3 MILLIGRAM(S): at 21:40

## 2022-04-21 RX ADMIN — Medication 25 MICROGRAM(S): at 05:05

## 2022-04-21 RX ADMIN — Medication 100 MILLIGRAM(S): at 05:05

## 2022-04-21 RX ADMIN — Medication 1000 MILLIGRAM(S): at 23:16

## 2022-04-21 RX ADMIN — Medication 1000 MILLIGRAM(S): at 13:30

## 2022-04-21 RX ADMIN — DRONEDARONE 400 MILLIGRAM(S): 400 TABLET, FILM COATED ORAL at 12:18

## 2022-04-21 RX ADMIN — Medication 1000 MILLIGRAM(S): at 03:00

## 2022-04-21 NOTE — PHYSICAL THERAPY INITIAL EVALUATION ADULT - LEVEL OF CONSCIOUSNESS, REHAB EVAL
slurred speech, dizziness with sitting, low BP (100/60 sitting at EOB, 95/64 after transfer to chair)/alert/sedated

## 2022-04-21 NOTE — PHYSICAL THERAPY INITIAL EVALUATION ADULT - MODALITIES TREATMENT COMMENTS
Patient  left in chair with CBIR and chair alarm active., spouse at bedside. Patient instructed to call for assistance back to bed or the bathroom, understands same. RN informed of session/status.

## 2022-04-21 NOTE — PROGRESS NOTE ADULT - SUBJECTIVE AND OBJECTIVE BOX
POD#1. Pt seen resting in bed. Pt has incisional site soreness tolerable with PCA. Pt denies lower extremities pain, numbness, and weakness. Aldana intact. She has yet to mobilize with physical therapy. Brace seen at bedside.    PE  Gen appearance: NAD. Alert and orient x 3 (time, location, surgeon).   motor strength: 5/5 of b/l Hf, quads, ant tibs, gastrocs, and EHL  Sensation: intact to light touch bilat  no calf tenderness bilat. Venodynes on bilat  Incisional site: clean and dry dressing. hvac: 130cc kept    Plan  Afeb, BP:106/68 with episodes of hypotension eval per Hospitalist.  WBC:11.16, H/H:11.0/34.5  Mobilize with physical therapy and encouraged incentive spirometer.  PCA and aldana kept.

## 2022-04-21 NOTE — PHYSICAL THERAPY INITIAL EVALUATION ADULT - GENERAL OBSERVATIONS, REHAB EVAL
Pt rec'd supine in bed on SICU. Pt woken from sleep, became more alert t/o session. Alert, but signs of sedation (slurred speech, dizziness with sitting, decreased BP). Pt rec'd supine in bed on SICU. Pt woken from sleep, became more alert t/o session. Alert, but signs of sedation (slurred speech, dizziness with sitting, decreased BP). Spouse in at end of session. Pt rec'd supine in bed on SICU. Pt woken from sleep, became more alert t/o session. Alert, but signs of sedation (slurred speech, dizziness with sitting, decreased BP). Spouse in at end of session. +ICU monitors, +IV/PVC, +HV, +Marlow, +B SCDs.

## 2022-04-21 NOTE — PHYSICAL THERAPY INITIAL EVALUATION ADULT - PERTINENT HX OF CURRENT PROBLEM, REHAB EVAL
76y Female s/p revision L2-5 laminectomy with instrumented posterolateral spinal fusion POD0. Tolerated procedure well without complications 76y Female s/p revision L2-5 laminectomy with instrumented posterolateral spinal fusion POD 1. Tolerated procedure well without complications 76y Female s/p  L2-5 laminectomy with instrumented posterolateral spinal fusion POD 1. Tolerated procedure well without complications

## 2022-04-22 DIAGNOSIS — F32.9 MAJOR DEPRESSIVE DISORDER, SINGLE EPISODE, UNSPECIFIED: ICD-10-CM

## 2022-04-22 DIAGNOSIS — K51.90 ULCERATIVE COLITIS, UNSPECIFIED, WITHOUT COMPLICATIONS: ICD-10-CM

## 2022-04-22 DIAGNOSIS — Z87.39 PERSONAL HISTORY OF OTHER DISEASES OF THE MUSCULOSKELETAL SYSTEM AND CONNECTIVE TISSUE: ICD-10-CM

## 2022-04-22 DIAGNOSIS — I48.91 UNSPECIFIED ATRIAL FIBRILLATION: ICD-10-CM

## 2022-04-22 DIAGNOSIS — M51.9 UNSPECIFIED THORACIC, THORACOLUMBAR AND LUMBOSACRAL INTERVERTEBRAL DISC DISORDER: ICD-10-CM

## 2022-04-22 DIAGNOSIS — I10 ESSENTIAL (PRIMARY) HYPERTENSION: ICD-10-CM

## 2022-04-22 LAB
ANION GAP SERPL CALC-SCNC: 6 MMOL/L — SIGNIFICANT CHANGE UP (ref 5–17)
BASOPHILS # BLD AUTO: 0.03 K/UL — SIGNIFICANT CHANGE UP (ref 0–0.2)
BASOPHILS NFR BLD AUTO: 0.3 % — SIGNIFICANT CHANGE UP (ref 0–2)
BUN SERPL-MCNC: 10 MG/DL — SIGNIFICANT CHANGE UP (ref 7–23)
CALCIUM SERPL-MCNC: 9.1 MG/DL — SIGNIFICANT CHANGE UP (ref 8.5–10.1)
CHLORIDE SERPL-SCNC: 104 MMOL/L — SIGNIFICANT CHANGE UP (ref 96–108)
CO2 SERPL-SCNC: 28 MMOL/L — SIGNIFICANT CHANGE UP (ref 22–31)
CREAT SERPL-MCNC: 0.64 MG/DL — SIGNIFICANT CHANGE UP (ref 0.5–1.3)
EGFR: 90 ML/MIN/1.73M2 — SIGNIFICANT CHANGE UP
EOSINOPHIL # BLD AUTO: 0.04 K/UL — SIGNIFICANT CHANGE UP (ref 0–0.5)
EOSINOPHIL NFR BLD AUTO: 0.4 % — SIGNIFICANT CHANGE UP (ref 0–6)
GLUCOSE SERPL-MCNC: 77 MG/DL — SIGNIFICANT CHANGE UP (ref 70–99)
HCT VFR BLD CALC: 32.6 % — LOW (ref 34.5–45)
HGB BLD-MCNC: 10.5 G/DL — LOW (ref 11.5–15.5)
IMM GRANULOCYTES NFR BLD AUTO: 0.5 % — SIGNIFICANT CHANGE UP (ref 0–1.5)
LYMPHOCYTES # BLD AUTO: 0.25 K/UL — LOW (ref 1–3.3)
LYMPHOCYTES # BLD AUTO: 2.3 % — LOW (ref 13–44)
MCHC RBC-ENTMCNC: 28.3 PG — SIGNIFICANT CHANGE UP (ref 27–34)
MCHC RBC-ENTMCNC: 32.2 GM/DL — SIGNIFICANT CHANGE UP (ref 32–36)
MCV RBC AUTO: 87.9 FL — SIGNIFICANT CHANGE UP (ref 80–100)
MONOCYTES # BLD AUTO: 1.05 K/UL — HIGH (ref 0–0.9)
MONOCYTES NFR BLD AUTO: 9.6 % — SIGNIFICANT CHANGE UP (ref 2–14)
NEUTROPHILS # BLD AUTO: 9.55 K/UL — HIGH (ref 1.8–7.4)
NEUTROPHILS NFR BLD AUTO: 86.9 % — HIGH (ref 43–77)
PLATELET # BLD AUTO: 145 K/UL — LOW (ref 150–400)
POTASSIUM SERPL-MCNC: 3.3 MMOL/L — LOW (ref 3.5–5.3)
POTASSIUM SERPL-SCNC: 3.3 MMOL/L — LOW (ref 3.5–5.3)
RBC # BLD: 3.71 M/UL — LOW (ref 3.8–5.2)
RBC # FLD: 24 % — HIGH (ref 10.3–14.5)
SODIUM SERPL-SCNC: 138 MMOL/L — SIGNIFICANT CHANGE UP (ref 135–145)
WBC # BLD: 10.97 K/UL — HIGH (ref 3.8–10.5)
WBC # FLD AUTO: 10.97 K/UL — HIGH (ref 3.8–10.5)

## 2022-04-22 PROCEDURE — 99222 1ST HOSP IP/OBS MODERATE 55: CPT

## 2022-04-22 RX ORDER — POTASSIUM CHLORIDE 20 MEQ
40 PACKET (EA) ORAL ONCE
Refills: 0 | Status: COMPLETED | OUTPATIENT
Start: 2022-04-22 | End: 2022-04-22

## 2022-04-22 RX ORDER — OXYCODONE HYDROCHLORIDE 5 MG/1
10 TABLET ORAL EVERY 4 HOURS
Refills: 0 | Status: DISCONTINUED | OUTPATIENT
Start: 2022-04-22 | End: 2022-04-24

## 2022-04-22 RX ORDER — OXYCODONE HYDROCHLORIDE 5 MG/1
5 TABLET ORAL EVERY 4 HOURS
Refills: 0 | Status: DISCONTINUED | OUTPATIENT
Start: 2022-04-22 | End: 2022-04-24

## 2022-04-22 RX ORDER — SODIUM CHLORIDE 9 MG/ML
1000 INJECTION INTRAMUSCULAR; INTRAVENOUS; SUBCUTANEOUS
Refills: 0 | Status: DISCONTINUED | OUTPATIENT
Start: 2022-04-22 | End: 2022-04-24

## 2022-04-22 RX ORDER — HYDROMORPHONE HYDROCHLORIDE 2 MG/ML
0.5 INJECTION INTRAMUSCULAR; INTRAVENOUS; SUBCUTANEOUS EVERY 4 HOURS
Refills: 0 | Status: DISCONTINUED | OUTPATIENT
Start: 2022-04-22 | End: 2022-04-24

## 2022-04-22 RX ADMIN — Medication 400 MILLIGRAM(S): at 19:01

## 2022-04-22 RX ADMIN — SODIUM CHLORIDE 3 MILLILITER(S): 9 INJECTION INTRAMUSCULAR; INTRAVENOUS; SUBCUTANEOUS at 06:46

## 2022-04-22 RX ADMIN — Medication 40 MILLIEQUIVALENT(S): at 09:43

## 2022-04-22 RX ADMIN — OXYCODONE HYDROCHLORIDE 5 MILLIGRAM(S): 5 TABLET ORAL at 21:28

## 2022-04-22 RX ADMIN — DRONEDARONE 400 MILLIGRAM(S): 400 TABLET, FILM COATED ORAL at 21:27

## 2022-04-22 RX ADMIN — Medication 1000 MILLIGRAM(S): at 10:31

## 2022-04-22 RX ADMIN — ATORVASTATIN CALCIUM 20 MILLIGRAM(S): 80 TABLET, FILM COATED ORAL at 21:28

## 2022-04-22 RX ADMIN — OXYCODONE HYDROCHLORIDE 5 MILLIGRAM(S): 5 TABLET ORAL at 22:34

## 2022-04-22 RX ADMIN — SODIUM CHLORIDE 75 MILLILITER(S): 9 INJECTION INTRAMUSCULAR; INTRAVENOUS; SUBCUTANEOUS at 12:36

## 2022-04-22 RX ADMIN — Medication 25 MICROGRAM(S): at 06:48

## 2022-04-22 RX ADMIN — DRONEDARONE 400 MILLIGRAM(S): 400 TABLET, FILM COATED ORAL at 09:43

## 2022-04-22 RX ADMIN — OXYCODONE HYDROCHLORIDE 5 MILLIGRAM(S): 5 TABLET ORAL at 14:29

## 2022-04-22 RX ADMIN — Medication 3 MILLIGRAM(S): at 21:28

## 2022-04-22 RX ADMIN — Medication 1000 MILLIGRAM(S): at 19:30

## 2022-04-22 RX ADMIN — Medication 400 MILLIGRAM(S): at 09:29

## 2022-04-22 RX ADMIN — SODIUM CHLORIDE 3 MILLILITER(S): 9 INJECTION INTRAMUSCULAR; INTRAVENOUS; SUBCUTANEOUS at 23:34

## 2022-04-22 RX ADMIN — ONDANSETRON 4 MILLIGRAM(S): 8 TABLET, FILM COATED ORAL at 21:29

## 2022-04-22 RX ADMIN — OXYCODONE HYDROCHLORIDE 5 MILLIGRAM(S): 5 TABLET ORAL at 15:30

## 2022-04-22 NOTE — PROGRESS NOTE ADULT - SUBJECTIVE AND OBJECTIVE BOX
POD#2. Pt seen resting in bed. Pt has incisional site soreness tolerable with current medication regimen. Pt denies lower extremities pain, numbness, and weakness. PCA intact. Marlow discontinued.    PE  Gen appearance: NAD. Alert and oriented x 3  Motor strength: 5/5 of b/l quads, HF, ant tibs, gastrocs, and EHL  Sensation: intact to light touch bilat  No calf tenderness bilat. Venodynes on bilaterally  Incisional site: clean and dry dressing. Drain:175cc last shift 50cc    Plan  Afeb, BP:118/72, HR:99. Episodes of hypotension yesterday  WBC: 10.97, H/H:10.5/32.6. Potassium:3.3L replete  Mobilize with physical therapy and encouraged incentive spirometer.   DC PCA. Transition to oral and subcutaneous meds.   Keep in Stepdown.  Discussed case with Dr. Vaca.    POD#2. Pt seen resting in bed. Pt has incisional site soreness tolerable with current medication regimen. Pt denies lower extremities pain, numbness, and weakness. PCA intact. Marlow discontinued.    PE  Gen appearance: NAD. Alert and oriented x 3  Motor strength: 5/5 of b/l quads, HF, ant tibs, gastrocs, and EHL  Sensation: intact to light touch bilat  No calf tenderness bilat. Venodynes on bilaterally  Incisional site: clean and dry dressing. Drain:175cc last shift 50cc    Plan  Afeb, BP:118/72, HR:99. Episodes of hypotension yesterday--eval per Hospitalist.   WBC: 10.97, H/H:10.5/32.6. Potassium:3.3L replete  Mobilize with physical therapy and encouraged incentive spirometer.   DC PCA. Transition to oral and subcutaneous meds.   Keep in Stepdown.  Discussed case with Dr. Vaca.

## 2022-04-22 NOTE — PROGRESS NOTE ADULT - SUBJECTIVE AND OBJECTIVE BOX
Patient seen and examined at bedside. Pain well controlled with medication. Patient denies fevers, chills, saddle anesthesia, bowel or bladder incontinence, leg pain, numbness, weakness, or any other orthopaedic complaint.     Exam:   T(C): 37 (04-22-22 @ 06:00), Max: 37 (04-22-22 @ 06:00)  T(F): 98.6 (04-22-22 @ 06:00), Max: 98.6 (04-22-22 @ 06:00)  HR: 75 (04-22-22 @ 06:00) (70 - 117)  BP: 111/74 (04-22-22 @ 06:00) (79/45 - 116/65)  RR: 15 (04-22-22 @ 06:00) (13 - 25)  SpO2: 100% (04-22-22 @ 06:00) (96% - 100%)    Gen: resting in bed, NAD  Spine:  Dressing in place, c/d/i. JASPREET drain in place with serosanguineous drainage.  No bony TTP in the C-, T-, or L-spine in midline or paraspinal areas.   Negative Mcmullen, Negative Babinski, Negative myoclonus bilaterally  Radial, DP pulses palpable.  No calf tenderness bilaterally.  Compartments soft and compressible.     Motor:                   Elbow Flex     Wrist Ext      Elbow Ext      Finger Flex     Finger Abd               R                   5/5                 5/5                 5/5                  5/5                 5/5  L                    5/5                 5/5                 5/5                  5/5                 5/5              Hip Flex     Knee Ext     Ankle Dorsi     Hallux Ext     Ankle Plant  R            5/5              5/5               5/5                    5/5                5/5  L             5/5             5/5                5/5                   5/5                 5/5    Sensory:            C5         C6         C7      C8       T1        (0=absent, 1=impaired, 2=normal, NT=not testable)  R         2            2           2        2         2  L          2            2           2        2         2               L2          L3         L4      L5       S1         (0=absent, 1=impaired, 2=normal, NT=not testable)  R         2            2            2        2        2  L          2            2           2        2         2     Laboratory Results:   CBC, 04-22-22 @ 05:35    WBC: 10.97  Hgb: 10.5  Hct: 32.6  Plt: 145      Chemistry, 04-22-22 @ 05:35    Na: 138     AST: --  K: 3.3       ALT: --  Cl: 104      TProt: --  CO2: 28     Alb: --  BUN: 10     TBili: --  Cr: 0.64      AP: --  Glu: 77       Mg: --  P: --    eGFR: --  eGFR, AA: --

## 2022-04-22 NOTE — CONSULT NOTE ADULT - SUBJECTIVE AND OBJECTIVE BOX
76 year old female diagnosed with lumbar stenosis, spondylolisthesis, scoliosis with back pain x 1 month and sciatica radiating to right leg s/p Exploration of previous spinal fusion lumbar laminectomy  right sided transforaminal interbody fusion correction of scoliosis  Pt is sitting in the chair, c/o thirst, able to urinate, noted mild hypotension with reflex tachycardia. + episodes of sundowning  Other ROS reviewed and neg     PAST MEDICAL HISTORY:  Atrial fibrillation   Depression   History of osteoarthritis   Hypertension   Hypothyroid   Lumbar disc disorder   Osteoporosis   Scoliosis   Spinal stenosis   Spondylolisthesis   Ulcerative colitis signs of precancer had partial colectomy.     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     FAMILY HISTORY:  Mother: osteoarthritis    SHx: no tobacco, ETOH, IVDA    Meds: see MR    T(C): 37 (04-22-22 @ 06:00), Max: 37 (04-22-22 @ 06:00)  HR: 95 (04-22-22 @ 12:00) (75 - 117)  BP: 89/72 (04-22-22 @ 12:00) (79/45 - 118/72)  RR: 17 (04-22-22 @ 12:00) (13 - 25)  SpO2: 98% (04-22-22 @ 12:00) (89% - 100%)    04-21-22 @ 07:01  -  04-22-22 @ 07:00  --------------------------------------------------------  IN: 100 mL / OUT: 675 mL / NET: -575 mL    04-22-22 @ 07:01  -  04-22-22 @ 13:34  --------------------------------------------------------  IN: 100 mL / OUT: 300 mL / NET: -200 mL                        10.5   10.97 )-----------( 145      ( 22 Apr 2022 05:35 )             32.6     22 Apr 2022 05:35    138    |  104    |  10     ----------------------------<  77     3.3     |  28     |  0.64     Ca    9.1        22 Apr 2022 05:35    CAPILLARY BLOOD GLUCOSE  POCT Blood Glucose.: 93 mg/dL (21 Apr 2022 18:16)    acetaminophen   IVPB .. 1000 milliGRAM(s) IV Intermittent once  atorvastatin 20 milliGRAM(s) Oral at bedtime  dronedarone 400 milliGRAM(s) Oral two times a day  HYDROmorphone  Injectable 0.5 milliGRAM(s) SubCutaneous every 4 hours PRN  levothyroxine 25 MICROGram(s) Oral daily  melatonin 3 milliGRAM(s) Oral at bedtime PRN  mercaptopurine 50 milliGRAM(s) Oral once  metoprolol succinate ER 50 milliGRAM(s) Oral daily  naloxone Injectable 0.1 milliGRAM(s) IV Push every 3 minutes PRN  ondansetron Injectable 4 milliGRAM(s) IV Push every 6 hours PRN  ondansetron Injectable 4 milliGRAM(s) IV Push every 6 hours PRN  oxyCODONE    IR 5 milliGRAM(s) Oral every 4 hours PRN  oxyCODONE    IR 10 milliGRAM(s) Oral every 4 hours PRN  sodium chloride 0.9% lock flush 3 milliLiter(s) IV Push every 8 hours  sodium chloride 0.9%. 1000 milliLiter(s) IV Continuous <Continuous>  triamterene 37.5 mG/hydrochlorothiazide 25 mG Tablet 1 Tablet(s) Oral daily    RADIOLOGY: personally visualized    All available medical records personally reviewed    PHYSICAL EXAM:    General: elderly female in no acute distress  Eyes: PERRLA, EOMI; conjunctiva and sclera clear  Head: Normocephalic; atraumatic  ENMT: No nasal discharge; airway clear  Neck: Supple; non tender; no masses  Respiratory: No wheezes, rales or rhonchi  Cardiovascular: S1, S2 irreg  Gastrointestinal: Soft non-tender non-distended; Normal bowel sounds  Genitourinary: No costovertebral angle tenderness  Extremities: No clubbing, cyanosis or edema  Vascular: Peripheral pulses palpable 2+ bilaterally  Neurological: Alert and oriented x4  Skin: Warm and dry.   Musculoskeletal: Normal tone  Psychiatric: Cooperative and appropriate

## 2022-04-22 NOTE — CHART NOTE - NSCHARTNOTEFT_GEN_A_CORE
PCA discontinued. Oxycodone 5mg Q4H for mild pain. Oxycodone 10mg Q4H for moderate pain. Dilaudid 0.5mg subcutaneous Q4H for severe pain.

## 2022-04-22 NOTE — CONSULT NOTE ADULT - ASSESSMENT
76 y.o. female s/p  L3-5 laminectomy/PSIF and T12 kyphoplasty   - pain control, PT, drain management and resume AC as per surgery    Hypotension due to decreased intake and increased output on diuretics   - hold diuretics, IVF for 12h, increase po fluids intake   - transitioning from PCA to po oxy will decrease cardiodepressive effect    Hypothyroidism - synthroid    Chronic Afib on dronedarone and BBL   - resume eliquis when cleared by surgery    IBS - resume mercaptopurine    Thank you for courtesy of consult.  Will follow along.   Time spent 54 min

## 2022-04-23 LAB
ANION GAP SERPL CALC-SCNC: 6 MMOL/L — SIGNIFICANT CHANGE UP (ref 5–17)
BASOPHILS # BLD AUTO: 0.03 K/UL — SIGNIFICANT CHANGE UP (ref 0–0.2)
BASOPHILS NFR BLD AUTO: 0.2 % — SIGNIFICANT CHANGE UP (ref 0–2)
BUN SERPL-MCNC: 12 MG/DL — SIGNIFICANT CHANGE UP (ref 7–23)
CALCIUM SERPL-MCNC: 9.3 MG/DL — SIGNIFICANT CHANGE UP (ref 8.5–10.1)
CHLORIDE SERPL-SCNC: 104 MMOL/L — SIGNIFICANT CHANGE UP (ref 96–108)
CO2 SERPL-SCNC: 27 MMOL/L — SIGNIFICANT CHANGE UP (ref 22–31)
CREAT SERPL-MCNC: 0.72 MG/DL — SIGNIFICANT CHANGE UP (ref 0.5–1.3)
EGFR: 86 ML/MIN/1.73M2 — SIGNIFICANT CHANGE UP
EOSINOPHIL # BLD AUTO: 0.03 K/UL — SIGNIFICANT CHANGE UP (ref 0–0.5)
EOSINOPHIL NFR BLD AUTO: 0.2 % — SIGNIFICANT CHANGE UP (ref 0–6)
GLUCOSE SERPL-MCNC: 103 MG/DL — HIGH (ref 70–99)
HCT VFR BLD CALC: 36.3 % — SIGNIFICANT CHANGE UP (ref 34.5–45)
HGB BLD-MCNC: 11.4 G/DL — LOW (ref 11.5–15.5)
IMM GRANULOCYTES NFR BLD AUTO: 0.7 % — SIGNIFICANT CHANGE UP (ref 0–1.5)
LYMPHOCYTES # BLD AUTO: 0.2 K/UL — LOW (ref 1–3.3)
LYMPHOCYTES # BLD AUTO: 1.6 % — LOW (ref 13–44)
MCHC RBC-ENTMCNC: 27.8 PG — SIGNIFICANT CHANGE UP (ref 27–34)
MCHC RBC-ENTMCNC: 31.4 GM/DL — LOW (ref 32–36)
MCV RBC AUTO: 88.5 FL — SIGNIFICANT CHANGE UP (ref 80–100)
MONOCYTES # BLD AUTO: 0.84 K/UL — SIGNIFICANT CHANGE UP (ref 0–0.9)
MONOCYTES NFR BLD AUTO: 6.9 % — SIGNIFICANT CHANGE UP (ref 2–14)
NEUTROPHILS # BLD AUTO: 10.99 K/UL — HIGH (ref 1.8–7.4)
NEUTROPHILS NFR BLD AUTO: 90.4 % — HIGH (ref 43–77)
PLATELET # BLD AUTO: 151 K/UL — SIGNIFICANT CHANGE UP (ref 150–400)
POTASSIUM SERPL-MCNC: 3.9 MMOL/L — SIGNIFICANT CHANGE UP (ref 3.5–5.3)
POTASSIUM SERPL-SCNC: 3.9 MMOL/L — SIGNIFICANT CHANGE UP (ref 3.5–5.3)
RBC # BLD: 4.1 M/UL — SIGNIFICANT CHANGE UP (ref 3.8–5.2)
RBC # FLD: 24.6 % — HIGH (ref 10.3–14.5)
SODIUM SERPL-SCNC: 137 MMOL/L — SIGNIFICANT CHANGE UP (ref 135–145)
WBC # BLD: 12.17 K/UL — HIGH (ref 3.8–10.5)
WBC # FLD AUTO: 12.17 K/UL — HIGH (ref 3.8–10.5)

## 2022-04-23 PROCEDURE — 99233 SBSQ HOSP IP/OBS HIGH 50: CPT

## 2022-04-23 RX ORDER — ACETAMINOPHEN 500 MG
1000 TABLET ORAL ONCE
Refills: 0 | Status: COMPLETED | OUTPATIENT
Start: 2022-04-23 | End: 2022-04-23

## 2022-04-23 RX ADMIN — OXYCODONE HYDROCHLORIDE 5 MILLIGRAM(S): 5 TABLET ORAL at 20:27

## 2022-04-23 RX ADMIN — Medication 1000 MILLIGRAM(S): at 14:16

## 2022-04-23 RX ADMIN — DRONEDARONE 400 MILLIGRAM(S): 400 TABLET, FILM COATED ORAL at 21:51

## 2022-04-23 RX ADMIN — Medication 50 MILLIGRAM(S): at 05:40

## 2022-04-23 RX ADMIN — DRONEDARONE 400 MILLIGRAM(S): 400 TABLET, FILM COATED ORAL at 10:10

## 2022-04-23 RX ADMIN — SODIUM CHLORIDE 3 MILLILITER(S): 9 INJECTION INTRAMUSCULAR; INTRAVENOUS; SUBCUTANEOUS at 08:47

## 2022-04-23 RX ADMIN — OXYCODONE HYDROCHLORIDE 5 MILLIGRAM(S): 5 TABLET ORAL at 19:57

## 2022-04-23 RX ADMIN — Medication 25 MICROGRAM(S): at 05:37

## 2022-04-23 RX ADMIN — Medication 1000 MILLIGRAM(S): at 15:17

## 2022-04-23 RX ADMIN — ATORVASTATIN CALCIUM 20 MILLIGRAM(S): 80 TABLET, FILM COATED ORAL at 21:51

## 2022-04-23 RX ADMIN — OXYCODONE HYDROCHLORIDE 5 MILLIGRAM(S): 5 TABLET ORAL at 05:38

## 2022-04-23 RX ADMIN — MERCAPTOPURINE 50 MILLIGRAM(S): 50 TABLET ORAL at 10:12

## 2022-04-23 NOTE — PROGRESS NOTE ADULT - SUBJECTIVE AND OBJECTIVE BOX
76 year old female diagnosed with lumbar stenosis, spondylolisthesis, scoliosis with back pain x 1 month and sciatica radiating to right leg s/p Exploration of previous spinal fusion lumbar laminectomy  right sided transforaminal interbody fusion correction of scoliosis  POD#3      Vital Signs Last 24 Hrs  T(C): 37.1 (23 Apr 2022 09:47), Max: 37.1 (23 Apr 2022 09:47)  T(F): 98.7 (23 Apr 2022 09:47), Max: 98.7 (23 Apr 2022 09:47)  HR: 102 (23 Apr 2022 13:00) (85 - 115)  BP: 130/88 (23 Apr 2022 12:00) (95/70 - 130/88)  BP(mean): 102 (23 Apr 2022 12:00) (76 - 102)  RR: 21 (23 Apr 2022 13:00) (13 - 25)  SpO2: 96% (23 Apr 2022 08:00) (96% - 98%)      PHYSICAL EXAM:    General: elderly female in no acute distress  Eyes: PERRLA, EOMI; conjunctiva and sclera clear  Head: Normocephalic; atraumatic  ENMT: No nasal discharge; airway clear  Neck: Supple; non tender; no masses  Respiratory: No wheezes, rales or rhonchi  Cardiovascular: S1, S2 irreg  Gastrointestinal: Soft non-tender non-distended; Normal bowel sounds  Genitourinary: No costovertebral angle tenderness  Extremities: No clubbing, cyanosis or edema  Vascular: Peripheral pulses palpable 2+ bilaterally  Neurological: Alert and oriented x4  Skin: Warm and dry.   Musculoskeletal: Normal tone  Psychiatric: Cooperative and appropriate                          11.4   12.17 )-----------( 151      ( 23 Apr 2022 05:55 )             36.3   04-23    137  |  104  |  12  ----------------------------<  103<H>  3.9   |  27  |  0.72    Ca    9.3      23 Apr 2022 05:55    Tele AFL    MEDICATIONS  (STANDING):  atorvastatin 20 milliGRAM(s) Oral at bedtime  dronedarone 400 milliGRAM(s) Oral two times a day  levothyroxine 25 MICROGram(s) Oral daily  metoprolol succinate ER 50 milliGRAM(s) Oral daily  sodium chloride 0.9%. 1000 milliLiter(s) (75 mL/Hr) IV Continuous <Continuous>

## 2022-04-23 NOTE — PROGRESS NOTE ADULT - SUBJECTIVE AND OBJECTIVE BOX
Orthopedics      Patient seen and examined at bedside. Feeling well. Pain controlled. No n/v. No acute events overnight.    Vital Signs Last 24 Hrs  T(C): 36.9 (04-23-22 @ 04:00), Max: 36.9 (04-22-22 @ 16:00)  T(F): 98.5 (04-23-22 @ 04:00), Max: 98.5 (04-23-22 @ 04:00)  HR: 95 (04-23-22 @ 06:00) (88 - 115)  BP: 114/72 (04-23-22 @ 06:00) (89/72 - 122/87)  BP(mean): 84 (04-23-22 @ 06:00) (69 - 98)  RR: 21 (04-23-22 @ 06:00) (13 - 25)  SpO2: 96% (04-23-22 @ 06:00) (89% - 100%)                        11.4   12.17 )-----------( 151      ( 23 Apr 2022 05:55 )             36.3     23 Apr 2022 05:55    137    |  104    |  12     ----------------------------<  103    3.9     |  27     |  0.72     Ca    9.3        23 Apr 2022 05:55        Gen: resting in bed, NAD  Spine:  Dressing in place, c/d/i. JASPREET drain in place with serosanguineous drainage.  No bony TTP in the C-, T-, or L-spine in midline or paraspinal areas.   Negative Mcmullen, Negative Babinski, Negative myoclonus bilaterally  Radial, DP pulses palpable.  No calf tenderness bilaterally.  Compartments soft and compressible.     Motor:                   Elbow Flex     Wrist Ext      Elbow Ext      Finger Flex     Finger Abd               R                   5/5                 5/5                 5/5                  5/5                 5/5  L                    5/5                 5/5                 5/5                  5/5                 5/5              Hip Flex     Knee Ext     Ankle Dorsi     Hallux Ext     Ankle Plant  R            5/5              5/5               5/5                    5/5                5/5  L             5/5             5/5                5/5                   5/5                 5/5    Sensory:            C5         C6         C7      C8       T1        (0=absent, 1=impaired, 2=normal, NT=not testable)  R         2            2           2        2         2  L          2            2           2        2         2               L2          L3         L4      L5       S1         (0=absent, 1=impaired, 2=normal, NT=not testable)  R         2            2            2        2        2  L          2            2           2        2         2       76F POD3 L3-5 laminectomy/PSIF and T12 kyphoplasty    Plan:   WBAT/PT/OT  Please record drain outputs, minimal output overnight, may pull today   Pain management PRN  DVT prophylaxis: hold given spine surgery, SCD ok  Incentive spirometry  Dispo: pending PT recs  Will discuss with Dr. Vaca and will advise for any changes to the plan.

## 2022-04-23 NOTE — PROGRESS NOTE ADULT - ASSESSMENT
76F POD2 L3-5 laminectomy/PSIF and T12 kyphoplasty    Plan:   WBAT/PT/OT  Pain management PRN  DVT prophylaxis: hold given spine surgery, SCD ok  Incentive spirometry  Dispo: pending PT recs  Will discuss with Dr. Mccollum, and will advise for any changes to the plan.   
76 y.o. female s/p  L3-5 laminectomy/PSIF and T12 kyphoplasty   - pain control, PT,  and AC to be resumed Monday      Hypothyroidism - synthroid    Chronic AF AFL  on dronedarone and BBL   - resume Eliquis 4/25  consult cardiology: she is on Multaq and in AFL, it should be dc    IBS - resume mercaptopurine    
A/P: explore L4-5 fusion, L2-4 laminectomy, extension fusion L2-5 - stable  1. continue PT/mobilization  2. advance diet as tolerated  3. repeat CBC in am  4. do NOT resume Eliquis until POD #5 (Monday 4/25/22)  5. transfer to

## 2022-04-23 NOTE — PROGRESS NOTE ADULT - SUBJECTIVE AND OBJECTIVE BOX
[facility]                                                           Orthopedic Spine Progress Note      POST OPERATIVE DAY #: 3  STATUS POST: explore L4-5 fusion, L2-4 laminectomy, extension fusion L2-5                Pre-Op Dx: Lumbar radiculopathy      Post-Op Dx:  Lumbar radiculopathy      SUBJECTIVE: Patient seen and examined, reported episodes of sundowning, awake/alert this am, voiding, diet advanced, pain well-controlled, no LE complaints, WBC slightly elevated this am.       Current Pain Management:  [ ] PCA   [x] Po Analgesics [x] IM /IV Anagesics     Vital Signs Last 24 Hrs  T(C): 36.9 (23 Apr 2022 04:00), Max: 36.9 (22 Apr 2022 16:00)  T(F): 98.5 (23 Apr 2022 04:00), Max: 98.5 (23 Apr 2022 04:00)  HR: 95 (23 Apr 2022 06:00) (88 - 115)  BP: 114/72 (23 Apr 2022 06:00) (89/72 - 122/87)  BP(mean): 84 (23 Apr 2022 06:00) (69 - 98)  RR: 21 (23 Apr 2022 06:00) (13 - 25)  SpO2: 96% (23 Apr 2022 06:00) (89% - 100%)  I&O's Detail    22 Apr 2022 07:01  -  23 Apr 2022 07:00  --------------------------------------------------------  IN:    IV PiggyBack: 200 mL    sodium chloride 0.9%: 744 mL  Total IN: 944 mL    OUT:    Accordian (mL): 25 mL    Voided (mL): 800 mL  Total OUT: 825 mL    Total NET: 119 mL          OBJECTIVE:       Wound /Dressing: incision clean/dry/intact - dressing changed  Drain: 5cc - removed after d/w Dr. Vaca   Lumbar ROM: not tested  Neurological: A/O x 3              Sensation: [x] intact to light touch  [ ] decreased:          Motor exam: [x]          [x] Lower ext.         Hip Flx    Quad   Hamstrg         TA         EHL       GS                              R        5/5        5/5        5/5             5/5        5/5        5/5                                      L         5/5        5/5        5/5             5/5        5/5        5/5                                                              [x] Vascular: intact           Tension Signs: none          Long Tract Findings: none       LABS:                        11.4   12.17 )-----------( 151      ( 23 Apr 2022 05:55 )             36.3     04-23    137  |  104  |  12  ----------------------------<  103<H>  3.9   |  27  |  0.72    Ca    9.3      23 Apr 2022 05:55

## 2022-04-24 ENCOUNTER — TRANSCRIPTION ENCOUNTER (OUTPATIENT)
Age: 77
End: 2022-04-24

## 2022-04-24 VITALS
RESPIRATION RATE: 16 BRPM | HEART RATE: 110 BPM | SYSTOLIC BLOOD PRESSURE: 101 MMHG | DIASTOLIC BLOOD PRESSURE: 70 MMHG | TEMPERATURE: 99 F | OXYGEN SATURATION: 98 %

## 2022-04-24 LAB
ANION GAP SERPL CALC-SCNC: 6 MMOL/L — SIGNIFICANT CHANGE UP (ref 5–17)
BASOPHILS # BLD AUTO: 0.03 K/UL — SIGNIFICANT CHANGE UP (ref 0–0.2)
BASOPHILS NFR BLD AUTO: 0.3 % — SIGNIFICANT CHANGE UP (ref 0–2)
BUN SERPL-MCNC: 12 MG/DL — SIGNIFICANT CHANGE UP (ref 7–23)
CALCIUM SERPL-MCNC: 9.5 MG/DL — SIGNIFICANT CHANGE UP (ref 8.5–10.1)
CHLORIDE SERPL-SCNC: 101 MMOL/L — SIGNIFICANT CHANGE UP (ref 96–108)
CO2 SERPL-SCNC: 28 MMOL/L — SIGNIFICANT CHANGE UP (ref 22–31)
CREAT SERPL-MCNC: 0.79 MG/DL — SIGNIFICANT CHANGE UP (ref 0.5–1.3)
EGFR: 77 ML/MIN/1.73M2 — SIGNIFICANT CHANGE UP
EOSINOPHIL # BLD AUTO: 0.04 K/UL — SIGNIFICANT CHANGE UP (ref 0–0.5)
EOSINOPHIL NFR BLD AUTO: 0.4 % — SIGNIFICANT CHANGE UP (ref 0–6)
GLUCOSE SERPL-MCNC: 119 MG/DL — HIGH (ref 70–99)
HCT VFR BLD CALC: 34.2 % — LOW (ref 34.5–45)
HGB BLD-MCNC: 10.8 G/DL — LOW (ref 11.5–15.5)
IMM GRANULOCYTES NFR BLD AUTO: 0.9 % — SIGNIFICANT CHANGE UP (ref 0–1.5)
LYMPHOCYTES # BLD AUTO: 0.19 K/UL — LOW (ref 1–3.3)
LYMPHOCYTES # BLD AUTO: 1.8 % — LOW (ref 13–44)
MCHC RBC-ENTMCNC: 28 PG — SIGNIFICANT CHANGE UP (ref 27–34)
MCHC RBC-ENTMCNC: 31.6 GM/DL — LOW (ref 32–36)
MCV RBC AUTO: 88.6 FL — SIGNIFICANT CHANGE UP (ref 80–100)
MONOCYTES # BLD AUTO: 0.94 K/UL — HIGH (ref 0–0.9)
MONOCYTES NFR BLD AUTO: 9 % — SIGNIFICANT CHANGE UP (ref 2–14)
NEUTROPHILS # BLD AUTO: 9.16 K/UL — HIGH (ref 1.8–7.4)
NEUTROPHILS NFR BLD AUTO: 87.6 % — HIGH (ref 43–77)
PLATELET # BLD AUTO: 215 K/UL — SIGNIFICANT CHANGE UP (ref 150–400)
POTASSIUM SERPL-MCNC: 4 MMOL/L — SIGNIFICANT CHANGE UP (ref 3.5–5.3)
POTASSIUM SERPL-SCNC: 4 MMOL/L — SIGNIFICANT CHANGE UP (ref 3.5–5.3)
RBC # BLD: 3.86 M/UL — SIGNIFICANT CHANGE UP (ref 3.8–5.2)
RBC # FLD: 24.4 % — HIGH (ref 10.3–14.5)
SODIUM SERPL-SCNC: 135 MMOL/L — SIGNIFICANT CHANGE UP (ref 135–145)
WBC # BLD: 10.45 K/UL — SIGNIFICANT CHANGE UP (ref 3.8–10.5)
WBC # FLD AUTO: 10.45 K/UL — SIGNIFICANT CHANGE UP (ref 3.8–10.5)

## 2022-04-24 PROCEDURE — 99232 SBSQ HOSP IP/OBS MODERATE 35: CPT

## 2022-04-24 RX ORDER — GABAPENTIN 400 MG/1
1 CAPSULE ORAL
Qty: 0 | Refills: 0 | DISCHARGE

## 2022-04-24 RX ORDER — GABAPENTIN 400 MG/1
100 CAPSULE ORAL DAILY
Refills: 0 | Status: DISCONTINUED | OUTPATIENT
Start: 2022-04-24 | End: 2022-04-24

## 2022-04-24 RX ORDER — GABAPENTIN 400 MG/1
1 CAPSULE ORAL
Qty: 0 | Refills: 0 | DISCHARGE
Start: 2022-04-24

## 2022-04-24 RX ORDER — ESCITALOPRAM OXALATE 10 MG/1
1 TABLET, FILM COATED ORAL
Qty: 0 | Refills: 0 | DISCHARGE
Start: 2022-04-24

## 2022-04-24 RX ORDER — OXYCODONE HYDROCHLORIDE 5 MG/1
1 TABLET ORAL
Qty: 0 | Refills: 0 | DISCHARGE
Start: 2022-04-24

## 2022-04-24 RX ORDER — ALPRAZOLAM 0.25 MG
0.25 TABLET ORAL ONCE
Refills: 0 | Status: DISCONTINUED | OUTPATIENT
Start: 2022-04-24 | End: 2022-04-24

## 2022-04-24 RX ORDER — ESCITALOPRAM OXALATE 10 MG/1
20 TABLET, FILM COATED ORAL DAILY
Refills: 0 | Status: DISCONTINUED | OUTPATIENT
Start: 2022-04-24 | End: 2022-04-24

## 2022-04-24 RX ADMIN — OXYCODONE HYDROCHLORIDE 5 MILLIGRAM(S): 5 TABLET ORAL at 01:34

## 2022-04-24 RX ADMIN — OXYCODONE HYDROCHLORIDE 5 MILLIGRAM(S): 5 TABLET ORAL at 09:02

## 2022-04-24 RX ADMIN — OXYCODONE HYDROCHLORIDE 5 MILLIGRAM(S): 5 TABLET ORAL at 02:04

## 2022-04-24 RX ADMIN — Medication 0.25 MILLIGRAM(S): at 13:29

## 2022-04-24 RX ADMIN — Medication 25 MICROGRAM(S): at 05:32

## 2022-04-24 RX ADMIN — Medication 50 MILLIGRAM(S): at 10:48

## 2022-04-24 RX ADMIN — DRONEDARONE 400 MILLIGRAM(S): 400 TABLET, FILM COATED ORAL at 09:02

## 2022-04-24 NOTE — DISCHARGE NOTE PROVIDER - HOSPITAL COURSE
The patient was admitted on 04- for elective exploration of L4-5 fusion, L2-4 laminectomy, extension of fusion L2-5 done on the day of admission. The patient tolerated surgery without complication and was transferred to SDU from the recovery room.   POD #1 - PCA/aldana kept, drain 130cc - kept, WBC 11.16, H/H 11/34.5, neuro/motor intact, intervals of hypotension, afebrile, PT/mobilization begun  POD #2 - K+ 3.3 - repleted, PCA/aldana discontinued, drain 175cc - kept, WBC 10.97, other labs stable, VSS, afebrile, mobilizing  POD #3 - VSS, afebrile, voiding, +flatus, no BM, drain 5cc - removed, incision clean/dry/intact - dressing changed, neuro/motor intact, reported sundowning in the evening, AO x 3 in the am, WBC slight elevation to 12.17 - repeat labs in am ordered, K+ normalized, transferred to 2N  POD #4 - cleared by PT and medicine for discharge home, afebrile, WBC within normal limits at 10.45, wound clean/dry/intact - dressing changed, neuro/motor intact, mobilizing well, did stairs w/PT, voiding, +BM x 3, cleared by Dr. Vaca to resume Eliquis Monday am (4/25). Patient is subsequently discharged home in stable condition

## 2022-04-24 NOTE — DISCHARGE NOTE NURSING/CASE MANAGEMENT/SOCIAL WORK - NSDCPEFALRISK_GEN_ALL_CORE
For information on Fall & Injury Prevention, visit: [email or web address] OR  [email or web address] OR  https://www.cdc.gov/steadi/patient.html

## 2022-04-24 NOTE — DISCHARGE NOTE PROVIDER - CARE PROVIDER_API CALL
Abby Vaca)  Orthopaedic Surgery  [address] [place] [address], [address] [place] [address]  Phone: [phone]  Fax: [phone]  Follow Up Time:

## 2022-04-24 NOTE — DISCHARGE NOTE PROVIDER - NSDCCPTREATMENT_GEN_ALL_CORE_FT
PRINCIPAL PROCEDURE  Procedure: Posterior fusion of lumbar spine with laminectomy  Findings and Treatment: surgery, physical therapy, analgesia

## 2022-04-24 NOTE — PROGRESS NOTE ADULT - SUBJECTIVE AND OBJECTIVE BOX
Patient seen and examined at bedside. Feeling well. Pain controlled. Patient denies fevers, chills, saddle anesthesia, bowel or bladder incontinence, leg pain, numbness, weakness, or any other orthopaedic complaint.     Vital Signs Last 24 Hrs  T(C): 36.9 (24 Apr 2022 05:00), Max: 37.7 (23 Apr 2022 14:15)  T(F): 98.4 (24 Apr 2022 05:00), Max: 99.8 (23 Apr 2022 14:15)  HR: 100 (24 Apr 2022 05:00) (78 - 102)  BP: 101/53 (24 Apr 2022 05:00) (97/68 - 130/88)  BP(mean): 102 (23 Apr 2022 12:00) (78 - 102)  RR: 17 (24 Apr 2022 05:00) (14 - 23)  SpO2: 95% (24 Apr 2022 05:00) (95% - 100%)    Gen: resting in bed, NAD  Spine:  Dressing in place, c/d/i.   Radha-incisional TTP; otherwise, NTTP throughout the rest of the extremity.   Negative Mcmullen, Negative Babinski, Negative myoclonus bilaterally  Radial, DP pulses palpable.  No calf tenderness bilaterally.  Compartments soft and compressible.     Motor:                   Elbow Flex     Wrist Ext      Elbow Ext      Finger Flex     Finger Abd               R                   5/5                 5/5                 5/5                  5/5                 5/5  L                    5/5                 5/5                 5/5                  5/5                 5/5              Hip Flex     Knee Ext     Ankle Dorsi     Hallux Ext     Ankle Plant  R            5/5              5/5               5/5                    5/5                5/5  L             5/5             5/5                5/5                   5/5                 5/5    Sensory:            C5         C6         C7      C8       T1        (0=absent, 1=impaired, 2=normal, NT=not testable)  R         2            2           2        2         2  L          2            2           2        2         2               L2          L3         L4      L5       S1         (0=absent, 1=impaired, 2=normal, NT=not testable)  R         2            2            2        2        2  L          2            2           2        2         2       76F POD4 L3-5 laminectomy/PSIF and T12 kyphoplasty    Plan:   WBAT/PT/OT  Pain management PRN  DVT prophylaxis: hold given spine surgery, SCD ok  Incentive spirometry  Dispo: pending PT recs  Will discuss with Dr. Vaca and will advise for any changes to the plan.

## 2022-04-24 NOTE — DISCHARGE NOTE PROVIDER - NSDCMRMEDTOKEN_GEN_ALL_CORE_FT
Acidophilus oral capsule: 1 cap(s) orally once a day  biotin 5000 mcg oral capsule: orally once a day  Dyazide 37.5 mg-25 mg oral capsule: 1 cap(s) orally once a day  Eliquis 5 mg oral tablet: 1 tab(s) orally 2 times a day  escitalopram 20 mg oral tablet: 1 tab(s) orally once a day  gabapentin 100 mg oral capsule: 1 cap(s) orally once a day  Glucosamine &amp; Chondroitin with MSM oral tablet: 1 tab(s) orally once a day  Melatonin: 2 tab(s) orally once a day (at bedtime)  mercaptopurine 50 mg oral tablet: 1 tab(s) orally once a day  metoprolol succinate 50 mg oral tablet, extended release: 1 tab(s) orally once a day  Multaq: 200 milligram(s) orally 2 times a day  Multiple Vitamins oral capsule: 1 cap(s) orally once a day  oxyCODONE 5 mg oral tablet: 1 tab(s) orally every 4 hours, As needed, Mild Pain (1 - 3)  risedronate 35 mg oral delayed release tablet: 1 tab(s) orally once a week  rosuvastatin 5 mg oral tablet: 1 tab(s) orally once a day (at bedtime)  [place] Suppository: 1000 milligram(s) rectally 3 times a week  Synthroid 25 mcg (0.025 mg) oral tablet: 1 tab(s) orally once a day  Tylenol 500 mg oral tablet: 2 tab(s) orally every 6 hours, As Needed  Vitamin D3 25 mcg (1000 intl units) oral tablet: 1 tab(s) orally once a day (in the evening)

## 2022-04-24 NOTE — DISCHARGE NOTE PROVIDER - NSDCCPCAREPLAN_GEN_ALL_CORE_FT
PRINCIPAL DISCHARGE DIAGNOSIS  Diagnosis: S/P lumbar fusion  Assessment and Plan of Treatment: surgery, physical therapy, analgesia

## 2022-04-24 NOTE — DISCHARGE NOTE PROVIDER - NSDCFUADDINST_GEN_ALL_CORE_FT
May resume Eliquis beginning Monday morning (4/25/2022). Continue lumbar bracing when out of bed. May shower - no soaking baths or swimming. NO driving. Avoid bending or lifting heavy objects.

## 2022-04-24 NOTE — PROGRESS NOTE ADULT - SUBJECTIVE AND OBJECTIVE BOX
C/c: back pain    HPI: 76M, pmh of Depression, HTN, Ulcerative Colitis, A fib on eliquis, with lumbar stenosis, spondylolisthesis, scoliosis with back pain x 1 month and sciatica radiating to right leg.   She is admitted for L2-5 revision laminectomy and posterior fusion. Hospitalist service consulted for medical comanagement.   She is now transferred to .     pt seen and examined this am. Wants to go home. Pain controlled with meds. No sob/chest pain. Ambulated to bathroom earlier. No difficulty voiding. +BM. Tolerating po.    ROS: all 10 systems reviewed and is as above otherwise negative.     Vital Signs Last 24 Hrs  T(C): 36.7 (24 Apr 2022 09:05), Max: 37.7 (23 Apr 2022 14:15)  T(F): 98.1 (24 Apr 2022 09:05), Max: 99.8 (23 Apr 2022 14:15)  HR: 120 (24 Apr 2022 09:05) (78 - 120)  BP: 107/70 (24 Apr 2022 09:05) (101/53 - 130/88)  BP(mean): 102 (23 Apr 2022 12:00) (94 - 102)  RR: 16 (24 Apr 2022 09:05) (16 - 21)  SpO2: 98% (24 Apr 2022 09:05) (95% - 100%)        PHYSICAL EXAM:    GENERAL: Comfortable, no acute distress   HEAD:  Normocephalic, atraumatic  EYES: EOMI, PERRLA  HEENT: Moist mucous membranes  NECK: Supple, No JVD  NERVOUS SYSTEM:  Alert & Oriented X3, grossly non focal.  CHEST/LUNG: Clear to auscultation bilaterally  HEART: Regular rate and rhythm  ABDOMEN: Soft, Nontender, Nondistended, Bowel sounds present  GENITOURINARY: Voiding, no palpable bladder  EXTREMITIES:   No clubbing, cyanosis, or edema  MUSCULOSKELETAL- no muscle tenderness, normal tone  SKIN-no rash    LABS:                        10.8   10.45 )-----------( 215      ( 24 Apr 2022 09:29 )             34.2     04-24    135  |  101  |  12  ----------------------------<  119<H>  4.0   |  28  |  0.79    Ca    9.5      24 Apr 2022 09:29      MEDICATIONS  (STANDING):  atorvastatin 20 milliGRAM(s) Oral at bedtime  dronedarone 400 milliGRAM(s) Oral two times a day  levothyroxine 25 MICROGram(s) Oral daily  metoprolol succinate ER 50 milliGRAM(s) Oral daily  sodium chloride 0.9%. 1000 milliLiter(s) (75 mL/Hr) IV Continuous <Continuous>    MEDICATIONS  (PRN):  HYDROmorphone  Injectable 0.5 milliGRAM(s) SubCutaneous every 4 hours PRN Severe Pain (7 - 10)  melatonin 3 milliGRAM(s) Oral at bedtime PRN Insomnia  naloxone Injectable 0.1 milliGRAM(s) IV Push every 3 minutes PRN For ANY of the following changes in patient status:  A. RR LESS THAN 10 breaths per minute, B. Oxygen saturation LESS THAN 90%, C. Sedation score of 6  ondansetron Injectable 4 milliGRAM(s) IV Push every 6 hours PRN Nausea  ondansetron Injectable 4 milliGRAM(s) IV Push every 6 hours PRN Nausea  oxyCODONE    IR 5 milliGRAM(s) Oral every 4 hours PRN Mild Pain (1 - 3)  oxyCODONE    IR 10 milliGRAM(s) Oral every 4 hours PRN Moderate Pain (4 - 6)    ASSESSMENT AND PLAN:  76f, PMH AS ABOVE A/W:    1. Spinal stenosis/spondylolisthesis with radiculopathy:  -s/p L2-5 revision laminectomy/posterior fusion POD#4  -pain control with prn tylenol, oxycodone  -physical therapy  -incentive spirometry  -bowel regimen    2. HTN:    3.     76M, pmh of Depression, HTN, Ulcerative Colitis, A fib on eliquis, with lumbar stenosis, spondylolisthesis, scoliosis with back pain x 1 month and sciatica radiating to right leg.   She is admitted for L2-5 revision laminectomy and posterior fusion. Hospitalist service consulted for medical comanagement.   She is now transferred to .     76F POD4 L3-5 laminectomy/PSIF and T12 kyphoplasty          1.    C/c: back pain    HPI: 76M, pmh of Depression, HTN, Ulcerative Colitis, A fib on eliquis, with lumbar stenosis, spondylolisthesis, scoliosis with back pain x 1 month and sciatica radiating to right leg.   She is admitted for L2-5 revision laminectomy and posterior fusion. Hospitalist service consulted for medical comanagement.   She is now transferred to .     pt seen and examined this am. Wants to go home. Pain controlled with meds. No sob/chest pain. Ambulated to bathroom earlier. No difficulty voiding. +BM. Tolerating po.    ROS: all 10 systems reviewed and is as above otherwise negative.     Vital Signs Last 24 Hrs  T(C): 36.7 (24 Apr 2022 09:05), Max: 37.7 (23 Apr 2022 14:15)  T(F): 98.1 (24 Apr 2022 09:05), Max: 99.8 (23 Apr 2022 14:15)  HR: 120 (24 Apr 2022 09:05) (78 - 120)  BP: 107/70 (24 Apr 2022 09:05) (101/53 - 130/88)  BP(mean): 102 (23 Apr 2022 12:00) (94 - 102)  RR: 16 (24 Apr 2022 09:05) (16 - 21)  SpO2: 98% (24 Apr 2022 09:05) (95% - 100%)        PHYSICAL EXAM:    GENERAL: Comfortable, no acute distress   HEAD:  Normocephalic, atraumatic  EYES: EOMI, PERRLA  HEENT: Moist mucous membranes  NECK: Supple, No JVD  NERVOUS SYSTEM:  Alert & Oriented X3, forgetful, grossly non focal.  CHEST/LUNG: Clear to auscultation bilaterally  HEART: Regular rate and rhythm  ABDOMEN: Soft, Nontender, Nondistended, Bowel sounds present  GENITOURINARY: Voiding, no palpable bladder  EXTREMITIES:   No clubbing, cyanosis, or edema  MUSCULOSKELETAL- no muscle tenderness, normal tone  SKIN-no rash    LABS:                        10.8   10.45 )-----------( 215      ( 24 Apr 2022 09:29 )             34.2     04-24    135  |  101  |  12  ----------------------------<  119<H>  4.0   |  28  |  0.79    Ca    9.5      24 Apr 2022 09:29      MEDICATIONS  (STANDING):  atorvastatin 20 milliGRAM(s) Oral at bedtime  dronedarone 400 milliGRAM(s) Oral two times a day  levothyroxine 25 MICROGram(s) Oral daily  metoprolol succinate ER 50 milliGRAM(s) Oral daily  sodium chloride 0.9%. 1000 milliLiter(s) (75 mL/Hr) IV Continuous <Continuous>    MEDICATIONS  (PRN):  HYDROmorphone  Injectable 0.5 milliGRAM(s) SubCutaneous every 4 hours PRN Severe Pain (7 - 10)  melatonin 3 milliGRAM(s) Oral at bedtime PRN Insomnia  naloxone Injectable 0.1 milliGRAM(s) IV Push every 3 minutes PRN For ANY of the following changes in patient status:  A. RR LESS THAN 10 breaths per minute, B. Oxygen saturation LESS THAN 90%, C. Sedation score of 6  ondansetron Injectable 4 milliGRAM(s) IV Push every 6 hours PRN Nausea  ondansetron Injectable 4 milliGRAM(s) IV Push every 6 hours PRN Nausea  oxyCODONE    IR 5 milliGRAM(s) Oral every 4 hours PRN Mild Pain (1 - 3)  oxyCODONE    IR 10 milliGRAM(s) Oral every 4 hours PRN Moderate Pain (4 - 6)    ASSESSMENT AND PLAN:  76f, PMH AS ABOVE A/W:    1. Spinal stenosis/spondylolisthesis with radiculopathy:  -s/p L2-5 revision laminectomy/posterior fusion POD#4  -pain control with prn tylenol, oxycodone  -physical therapy  -incentive spirometry  -bowel regimen    2. HTN:  -continue bb with hold parameters.  -dyazide on hold.    3. PAF:  -on multaq, but is now back in afib, will d/w cardiology.   -continue bb  -eliquis to be resumed tomorrow per spine.     4. Ulcerative colitis:  -continue mercaptopurine.    5. Depression:  -resume lexapro.     6. Hypothyroid:  -continue synthroid.    7. DVT px:  -venodynes per primary team

## 2022-04-24 NOTE — PROGRESS NOTE ADULT - PROVIDER SPECIALTY LIST ADULT
Hospitalist
Orthopedics
Orthopedics
Hospitalist
Orthopedics

## 2022-05-03 DIAGNOSIS — M19.90 UNSPECIFIED OSTEOARTHRITIS, UNSPECIFIED SITE: ICD-10-CM

## 2022-05-03 DIAGNOSIS — F32.A DEPRESSION, UNSPECIFIED: ICD-10-CM

## 2022-05-03 DIAGNOSIS — F05 DELIRIUM DUE TO KNOWN PHYSIOLOGICAL CONDITION: ICD-10-CM

## 2022-05-03 DIAGNOSIS — I95.2 HYPOTENSION DUE TO DRUGS: ICD-10-CM

## 2022-05-03 DIAGNOSIS — K58.9 IRRITABLE BOWEL SYNDROME WITHOUT DIARRHEA: ICD-10-CM

## 2022-05-03 DIAGNOSIS — I48.20 CHRONIC ATRIAL FIBRILLATION, UNSPECIFIED: ICD-10-CM

## 2022-05-03 DIAGNOSIS — Z90.710 ACQUIRED ABSENCE OF BOTH CERVIX AND UTERUS: ICD-10-CM

## 2022-05-03 DIAGNOSIS — M53.2X6 SPINAL INSTABILITIES, LUMBAR REGION: ICD-10-CM

## 2022-05-03 DIAGNOSIS — I10 ESSENTIAL (PRIMARY) HYPERTENSION: ICD-10-CM

## 2022-05-03 DIAGNOSIS — T50.2X5A ADVERSE EFFECT OF CARBONIC-ANHYDRASE INHIBITORS, BENZOTHIADIAZIDES AND OTHER DIURETICS, INITIAL ENCOUNTER: ICD-10-CM

## 2022-05-03 DIAGNOSIS — M41.86 OTHER FORMS OF SCOLIOSIS, LUMBAR REGION: ICD-10-CM

## 2022-05-03 DIAGNOSIS — Z98.1 ARTHRODESIS STATUS: ICD-10-CM
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
## 2022-05-03 DIAGNOSIS — K51.90 ULCERATIVE COLITIS, UNSPECIFIED, WITHOUT COMPLICATIONS: ICD-10-CM

## 2022-05-03 DIAGNOSIS — M43.16 SPONDYLOLISTHESIS, LUMBAR REGION: ICD-10-CM

## 2022-05-03 DIAGNOSIS — E03.9 HYPOTHYROIDISM, UNSPECIFIED: ICD-10-CM

## 2022-05-03 DIAGNOSIS — Z79.01 LONG TERM (CURRENT) USE OF ANTICOAGULANTS: ICD-10-CM
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
## 2022-05-03 DIAGNOSIS — F03.90 UNSPECIFIED DEMENTIA WITHOUT BEHAVIORAL DISTURBANCE: ICD-10-CM

## 2022-05-03 DIAGNOSIS — M51.16 INTERVERTEBRAL DISC DISORDERS WITH RADICULOPATHY, LUMBAR REGION: ICD-10-CM

## 2022-05-03 DIAGNOSIS — T84.296A OTHER MECHANICAL COMPLICATION OF INTERNAL FIXATION DEVICE OF VERTEBRAE, INITIAL ENCOUNTER: ICD-10-CM

## 2022-05-03 DIAGNOSIS — R47.01 APHASIA: ICD-10-CM

## 2022-05-03 DIAGNOSIS — M81.0 AGE-RELATED OSTEOPOROSIS WITHOUT CURRENT PATHOLOGICAL FRACTURE: ICD-10-CM

## 2022-05-03 DIAGNOSIS — M48.061 SPINAL STENOSIS, LUMBAR REGION WITHOUT NEUROGENIC CLAUDICATION: ICD-10-CM

## 2022-05-03 DIAGNOSIS — Z87.891 PERSONAL HISTORY OF NICOTINE DEPENDENCE: ICD-10-CM

## 2022-05-03 DIAGNOSIS — Z96.611 PRESENCE OF RIGHT ARTIFICIAL SHOULDER JOINT: ICD-10-CM

## 2022-05-03 DIAGNOSIS — R00.0 TACHYCARDIA, UNSPECIFIED: ICD-10-CM

## 2022-05-16 PROBLEM — M41.9 SCOLIOSIS, UNSPECIFIED: Chronic | Status: ACTIVE | Noted: 2022-04-13

## 2022-05-16 PROBLEM — M48.00 SPINAL STENOSIS, SITE UNSPECIFIED: Chronic | Status: ACTIVE | Noted: 2022-04-13

## 2022-05-16 PROBLEM — M81.0 AGE-RELATED OSTEOPOROSIS WITHOUT CURRENT PATHOLOGICAL FRACTURE: Chronic | Status: ACTIVE | Noted: 2022-04-13

## 2022-05-16 PROBLEM — E03.9 HYPOTHYROIDISM, UNSPECIFIED: Chronic | Status: ACTIVE | Noted: 2022-04-13

## 2022-05-16 PROBLEM — M43.10 SPONDYLOLISTHESIS, SITE UNSPECIFIED: Chronic | Status: ACTIVE | Noted: 2022-04-13

## 2022-07-03 ENCOUNTER — NON-APPOINTMENT (OUTPATIENT)
Age: 77
End: 2022-07-03

## 2022-07-09 ENCOUNTER — APPOINTMENT (OUTPATIENT)
Dept: ORTHOPEDIC SURGERY | Facility: CLINIC | Age: 77
End: 2022-07-09

## 2022-07-09 VITALS
HEIGHT: 61.5 IN | SYSTOLIC BLOOD PRESSURE: 139 MMHG | BODY MASS INDEX: 24.6 KG/M2 | DIASTOLIC BLOOD PRESSURE: 81 MMHG | WEIGHT: 132 LBS | HEART RATE: 103 BPM

## 2022-07-09 DIAGNOSIS — M17.11 UNILATERAL PRIMARY OSTEOARTHRITIS, RIGHT KNEE: ICD-10-CM

## 2022-07-09 PROCEDURE — 73562 X-RAY EXAM OF KNEE 3: CPT | Mod: RT

## 2022-07-09 PROCEDURE — 99214 OFFICE O/P EST MOD 30 MIN: CPT

## 2022-07-11 ENCOUNTER — TRANSCRIPTION ENCOUNTER (OUTPATIENT)
Age: 77
End: 2022-07-11

## 2022-07-26 ENCOUNTER — OUTPATIENT (OUTPATIENT)
Dept: OUTPATIENT SERVICES | Facility: HOSPITAL | Age: 77
LOS: 1 days | End: 2022-07-26
Payer: MEDICARE

## 2022-07-26 VITALS
OXYGEN SATURATION: 98 % | SYSTOLIC BLOOD PRESSURE: 115 MMHG | DIASTOLIC BLOOD PRESSURE: 79 MMHG | HEIGHT: 61 IN | TEMPERATURE: 97 F | RESPIRATION RATE: 16 BRPM | WEIGHT: 139.99 LBS | HEART RATE: 96 BPM

## 2022-07-26 DIAGNOSIS — Z01.818 ENCOUNTER FOR OTHER PREPROCEDURAL EXAMINATION: ICD-10-CM

## 2022-07-26 DIAGNOSIS — Z90.711 ACQUIRED ABSENCE OF UTERUS WITH REMAINING CERVICAL STUMP: Chronic | ICD-10-CM

## 2022-07-26 DIAGNOSIS — Z98.890 OTHER SPECIFIED POSTPROCEDURAL STATES: Chronic | ICD-10-CM

## 2022-07-26 DIAGNOSIS — Z90.89 ACQUIRED ABSENCE OF OTHER ORGANS: Chronic | ICD-10-CM

## 2022-07-26 DIAGNOSIS — Z90.49 ACQUIRED ABSENCE OF OTHER SPECIFIED PARTS OF DIGESTIVE TRACT: Chronic | ICD-10-CM

## 2022-07-26 DIAGNOSIS — M17.11 UNILATERAL PRIMARY OSTEOARTHRITIS, RIGHT KNEE: ICD-10-CM

## 2022-07-26 DIAGNOSIS — Z98.1 ARTHRODESIS STATUS: Chronic | ICD-10-CM

## 2022-07-26 LAB
A1C WITH ESTIMATED AVERAGE GLUCOSE RESULT: 5.8 % — HIGH (ref 4–5.6)
ALBUMIN SERPL ELPH-MCNC: 3.7 G/DL — SIGNIFICANT CHANGE UP (ref 3.3–5)
ALP SERPL-CCNC: 108 U/L — SIGNIFICANT CHANGE UP (ref 30–120)
ALT FLD-CCNC: 28 U/L DA — SIGNIFICANT CHANGE UP (ref 10–60)
ANION GAP SERPL CALC-SCNC: 10 MMOL/L — SIGNIFICANT CHANGE UP (ref 5–17)
APTT BLD: 53.4 SEC — HIGH (ref 27.5–35.5)
AST SERPL-CCNC: 23 U/L — SIGNIFICANT CHANGE UP (ref 10–40)
BILIRUB SERPL-MCNC: 1.2 MG/DL — SIGNIFICANT CHANGE UP (ref 0.2–1.2)
BLD GP AB SCN SERPL QL: SIGNIFICANT CHANGE UP
BUN SERPL-MCNC: 19 MG/DL — SIGNIFICANT CHANGE UP (ref 7–23)
CALCIUM SERPL-MCNC: 10.7 MG/DL — HIGH (ref 8.4–10.5)
CHLORIDE SERPL-SCNC: 101 MMOL/L — SIGNIFICANT CHANGE UP (ref 96–108)
CO2 SERPL-SCNC: 33 MMOL/L — HIGH (ref 22–31)
CREAT SERPL-MCNC: 1.32 MG/DL — HIGH (ref 0.5–1.3)
EGFR: 41 ML/MIN/1.73M2 — LOW
ESTIMATED AVERAGE GLUCOSE: 120 MG/DL — HIGH (ref 68–114)
GLUCOSE SERPL-MCNC: 131 MG/DL — HIGH (ref 70–99)
HCT VFR BLD CALC: 43.5 % — SIGNIFICANT CHANGE UP (ref 34.5–45)
HGB BLD-MCNC: 13.7 G/DL — SIGNIFICANT CHANGE UP (ref 11.5–15.5)
INR BLD: 1.94 RATIO — HIGH (ref 0.88–1.16)
MCHC RBC-ENTMCNC: 30.4 PG — SIGNIFICANT CHANGE UP (ref 27–34)
MCHC RBC-ENTMCNC: 31.5 GM/DL — LOW (ref 32–36)
MCV RBC AUTO: 96.5 FL — SIGNIFICANT CHANGE UP (ref 80–100)
MRSA PCR RESULT.: DETECTED
NRBC # BLD: 0 /100 WBCS — SIGNIFICANT CHANGE UP (ref 0–0)
PLATELET # BLD AUTO: 281 K/UL — SIGNIFICANT CHANGE UP (ref 150–400)
POTASSIUM SERPL-MCNC: 4.7 MMOL/L — SIGNIFICANT CHANGE UP (ref 3.5–5.3)
POTASSIUM SERPL-SCNC: 4.7 MMOL/L — SIGNIFICANT CHANGE UP (ref 3.5–5.3)
PROT SERPL-MCNC: 7.6 G/DL — SIGNIFICANT CHANGE UP (ref 6–8.3)
PROTHROM AB SERPL-ACNC: 23.1 SEC — HIGH (ref 10.5–13.4)
RBC # BLD: 4.51 M/UL — SIGNIFICANT CHANGE UP (ref 3.8–5.2)
RBC # FLD: 14.9 % — HIGH (ref 10.3–14.5)
S AUREUS DNA NOSE QL NAA+PROBE: DETECTED
SODIUM SERPL-SCNC: 144 MMOL/L — SIGNIFICANT CHANGE UP (ref 135–145)
WBC # BLD: 7.98 K/UL — SIGNIFICANT CHANGE UP (ref 3.8–10.5)
WBC # FLD AUTO: 7.98 K/UL — SIGNIFICANT CHANGE UP (ref 3.8–10.5)

## 2022-07-26 PROCEDURE — G0463: CPT

## 2022-07-26 RX ORDER — CHOLECALCIFEROL (VITAMIN D3) 125 MCG
1 CAPSULE ORAL
Qty: 0 | Refills: 0 | DISCHARGE

## 2022-07-26 RX ORDER — GLUCOSAMINE/MSM/CHONDROITIN A 750-375MG
1 TABLET ORAL
Qty: 0 | Refills: 0 | DISCHARGE

## 2022-07-26 RX ORDER — LANOLIN ALCOHOL/MO/W.PET/CERES
2 CREAM (GRAM) TOPICAL
Qty: 0 | Refills: 0 | DISCHARGE

## 2022-07-26 RX ORDER — DRONEDARONE 400 MG/1
0 TABLET, FILM COATED ORAL
Qty: 0 | Refills: 0 | DISCHARGE

## 2022-07-26 NOTE — H&P PST ADULT - NSICDXPASTMEDICALHX_GEN_ALL_CORE_FT
PAST MEDICAL HISTORY:  Anxiety and depression     Anxiety and depression     Atrial fibrillation on Eliquis    Depression     H/O osteoporosis     History of osteoarthritis right knee    Hypertension     Hypothyroid     Lumbar disc disorder     Mild dementia     Osteoporosis     Scoliosis     Spinal stenosis     Spondylolisthesis     Ulcerative colitis signs of precancer had partial colectomy     PAST MEDICAL HISTORY:  Anxiety and depression     Atrial fibrillation on Eliquis    Depression     H/O insomnia     H/O osteoporosis     History of osteoarthritis right knee    Hypertension     Hypothyroid     Lumbar disc disorder     Mild dementia     Osteoporosis     Scoliosis     Spinal stenosis     Spondylolisthesis     Ulcerative colitis signs of precancer had partial colectomy

## 2022-07-26 NOTE — H&P PST ADULT - HISTORY OF PRESENT ILLNESS
78 y/o female with right knee pain for more than 3 years. Pain aggravated  with h/o fall 1 month ago. C/o buckling and loss of strength Takes Tylenol as needed with some relief. Failed conservative therapy and was advised surgery.  
DISCHARGE

## 2022-07-26 NOTE — H&P PST ADULT - NSICDXPASTSURGICALHX_GEN_ALL_CORE_FT
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

## 2022-07-26 NOTE — H&P PST ADULT - ASSESSMENT
76 y/o female with right knee pain  Planned surgery.- right total knee replacement  Will obtain medical clearance/cardiac clearance  covid test 8/12/22  will stop eliquis as per cardiologist  Pre op instructions provided  Instructions provided on medications to continue and to take the day morning of surgery   76 y/o female with right knee pain  Planned surgery.- right total knee replacement  Will obtain medical clearance/cardiac clearance  covid test 8/12/22  will stop eliquis as per cardiologist  Pre op instructions provided  Instructions provided on medications to continue and to take the day morning of surgery  patient was unable to make an appt with dr Negron. called office spoke with office staff

## 2022-07-27 RX ORDER — MUPIROCIN 20 MG/G
1 OINTMENT TOPICAL
Qty: 1 | Refills: 0
Start: 2022-07-27 | End: 2022-07-31

## 2022-07-27 NOTE — PROGRESS NOTE ADULT - SUBJECTIVE AND OBJECTIVE BOX
Nasal culture + for MRSA/MSSA.  Rx for Mupirocin e-scribed to pharmacy.  Spoke to patient, use of Mupirocin reviewed and understood.  Dr Tony's office notified of results.  SS

## 2022-08-01 ENCOUNTER — APPOINTMENT (OUTPATIENT)
Dept: GASTROENTEROLOGY | Facility: CLINIC | Age: 77
End: 2022-08-01

## 2022-08-01 VITALS
BODY MASS INDEX: 26.47 KG/M2 | SYSTOLIC BLOOD PRESSURE: 136 MMHG | HEIGHT: 61.5 IN | DIASTOLIC BLOOD PRESSURE: 102 MMHG | WEIGHT: 142 LBS | HEART RATE: 98 BPM

## 2022-08-01 PROBLEM — Z87.39 PERSONAL HISTORY OF OTHER DISEASES OF THE MUSCULOSKELETAL SYSTEM AND CONNECTIVE TISSUE: Chronic | Status: ACTIVE | Noted: 2017-09-15

## 2022-08-01 PROBLEM — F03.90 UNSPECIFIED DEMENTIA WITHOUT BEHAVIORAL DISTURBANCE: Chronic | Status: ACTIVE | Noted: 2022-07-26

## 2022-08-01 PROBLEM — Z87.898 PERSONAL HISTORY OF OTHER SPECIFIED CONDITIONS: Chronic | Status: ACTIVE | Noted: 2022-07-26

## 2022-08-01 PROBLEM — F41.9 ANXIETY DISORDER, UNSPECIFIED: Chronic | Status: ACTIVE | Noted: 2022-07-26

## 2022-08-01 PROCEDURE — 99203 OFFICE O/P NEW LOW 30 MIN: CPT

## 2022-08-01 NOTE — H&P PST ADULT - NS PRO FEM REPRO HEALTH SCREEN
Last visit 04/07/2022  Last refill 02/24/2022  Last a1c 04/07/2022  Diabetic Medication Protocol Failed 07/31/2022 02:19 PM   Protocol Details  Last HgBA1C < 7.5    HgBA1C procedure resulted in past 6 months    Microalbumin procedure in past 12 months or taking ACE/ARB    Appointment in past 6 or next 3 months mammogram no

## 2022-08-01 NOTE — HISTORY OF PRESENT ILLNESS
[de-identified] : 76yo female with hx colitis s/p colectomy with J-pouch around 1996\par \par She is largely asymptomatic but notes occasional bloating and constipation. .She has not had since 11/21

## 2022-08-01 NOTE — ASSESSMENT
[FreeTextEntry1] : 78yo female with hx UC s/p j-pouch \par \par Will check pouchoscopy\par Risks and benefits of procedure(s) discussed with patient in detail, including but not limited to, perforation, bleeding, reaction to anesthesia, missed lesions.\par

## 2022-08-12 LAB — SARS-COV-2 N GENE NPH QL NAA+PROBE: NOT DETECTED

## 2022-08-14 ENCOUNTER — FORM ENCOUNTER (OUTPATIENT)
Age: 77
End: 2022-08-14

## 2022-08-15 ENCOUNTER — TRANSCRIPTION ENCOUNTER (OUTPATIENT)
Age: 77
End: 2022-08-15

## 2022-08-15 ENCOUNTER — RESULT REVIEW (OUTPATIENT)
Age: 77
End: 2022-08-15

## 2022-08-15 ENCOUNTER — APPOINTMENT (OUTPATIENT)
Dept: ORTHOPEDIC SURGERY | Facility: HOSPITAL | Age: 77
End: 2022-08-15

## 2022-08-15 ENCOUNTER — INPATIENT (INPATIENT)
Facility: HOSPITAL | Age: 77
LOS: 0 days | Discharge: HOME | DRG: 470 | End: 2022-08-16
Attending: ORTHOPAEDIC SURGERY | Admitting: ORTHOPAEDIC SURGERY
Payer: COMMERCIAL

## 2022-08-15 VITALS
WEIGHT: 145.51 LBS | RESPIRATION RATE: 18 BRPM | TEMPERATURE: 98 F | HEIGHT: 61 IN | SYSTOLIC BLOOD PRESSURE: 132 MMHG | DIASTOLIC BLOOD PRESSURE: 87 MMHG | OXYGEN SATURATION: 97 % | HEART RATE: 82 BPM

## 2022-08-15 DIAGNOSIS — Z98.890 OTHER SPECIFIED POSTPROCEDURAL STATES: Chronic | ICD-10-CM

## 2022-08-15 DIAGNOSIS — Z90.89 ACQUIRED ABSENCE OF OTHER ORGANS: Chronic | ICD-10-CM

## 2022-08-15 DIAGNOSIS — M17.11 UNILATERAL PRIMARY OSTEOARTHRITIS, RIGHT KNEE: ICD-10-CM

## 2022-08-15 DIAGNOSIS — Z98.1 ARTHRODESIS STATUS: Chronic | ICD-10-CM

## 2022-08-15 DIAGNOSIS — Z90.49 ACQUIRED ABSENCE OF OTHER SPECIFIED PARTS OF DIGESTIVE TRACT: Chronic | ICD-10-CM

## 2022-08-15 DIAGNOSIS — Z90.711 ACQUIRED ABSENCE OF UTERUS WITH REMAINING CERVICAL STUMP: Chronic | ICD-10-CM

## 2022-08-15 LAB
ANION GAP SERPL CALC-SCNC: 8 MMOL/L — SIGNIFICANT CHANGE UP (ref 5–17)
APTT BLD: 41.7 SEC — HIGH (ref 27.5–35.5)
BUN SERPL-MCNC: 15 MG/DL — SIGNIFICANT CHANGE UP (ref 7–23)
CALCIUM SERPL-MCNC: 8.7 MG/DL — SIGNIFICANT CHANGE UP (ref 8.4–10.5)
CHLORIDE SERPL-SCNC: 97 MMOL/L — SIGNIFICANT CHANGE UP (ref 96–108)
CO2 SERPL-SCNC: 28 MMOL/L — SIGNIFICANT CHANGE UP (ref 22–31)
CREAT SERPL-MCNC: 1.14 MG/DL — SIGNIFICANT CHANGE UP (ref 0.5–1.3)
EGFR: 49 ML/MIN/1.73M2 — LOW
GLUCOSE SERPL-MCNC: 134 MG/DL — HIGH (ref 70–99)
HCT VFR BLD CALC: 38.1 % — SIGNIFICANT CHANGE UP (ref 34.5–45)
HGB BLD-MCNC: 12.4 G/DL — SIGNIFICANT CHANGE UP (ref 11.5–15.5)
INR BLD: 1.15 RATIO — SIGNIFICANT CHANGE UP (ref 0.88–1.16)
MCHC RBC-ENTMCNC: 30.3 PG — SIGNIFICANT CHANGE UP (ref 27–34)
MCHC RBC-ENTMCNC: 32.5 GM/DL — SIGNIFICANT CHANGE UP (ref 32–36)
MCV RBC AUTO: 93.2 FL — SIGNIFICANT CHANGE UP (ref 80–100)
NRBC # BLD: 0 /100 WBCS — SIGNIFICANT CHANGE UP (ref 0–0)
PLATELET # BLD AUTO: 228 K/UL — SIGNIFICANT CHANGE UP (ref 150–400)
POTASSIUM SERPL-MCNC: 3.8 MMOL/L — SIGNIFICANT CHANGE UP (ref 3.5–5.3)
POTASSIUM SERPL-SCNC: 3.8 MMOL/L — SIGNIFICANT CHANGE UP (ref 3.5–5.3)
PROTHROM AB SERPL-ACNC: 13.2 SEC — SIGNIFICANT CHANGE UP (ref 10.5–13.4)
RBC # BLD: 4.09 M/UL — SIGNIFICANT CHANGE UP (ref 3.8–5.2)
RBC # FLD: 14.3 % — SIGNIFICANT CHANGE UP (ref 10.3–14.5)
SODIUM SERPL-SCNC: 133 MMOL/L — LOW (ref 135–145)
WBC # BLD: 11.49 K/UL — HIGH (ref 3.8–10.5)
WBC # FLD AUTO: 11.49 K/UL — HIGH (ref 3.8–10.5)

## 2022-08-15 PROCEDURE — 99223 1ST HOSP IP/OBS HIGH 75: CPT

## 2022-08-15 PROCEDURE — 27447 TOTAL KNEE ARTHROPLASTY: CPT | Mod: RT

## 2022-08-15 PROCEDURE — 88305 TISSUE EXAM BY PATHOLOGIST: CPT | Mod: 26

## 2022-08-15 PROCEDURE — 73560 X-RAY EXAM OF KNEE 1 OR 2: CPT | Mod: 26,RT

## 2022-08-15 PROCEDURE — 88311 DECALCIFY TISSUE: CPT | Mod: 26

## 2022-08-15 DEVICE — PIN THREADED HEADED STRL: Type: IMPLANTABLE DEVICE | Site: RIGHT | Status: FUNCTIONAL

## 2022-08-15 DEVICE — COMP FEM NON POROUS SZ 5 RT: Type: IMPLANTABLE DEVICE | Site: RIGHT | Status: FUNCTIONAL

## 2022-08-15 DEVICE — COMP PATELLA TRI-PEG E-PLUS POLY 8X32MM: Type: IMPLANTABLE DEVICE | Site: RIGHT | Status: FUNCTIONAL

## 2022-08-15 DEVICE — INSERT TIB NONPOROUS UNIV SZ 5 RT: Type: IMPLANTABLE DEVICE | Site: RIGHT | Status: FUNCTIONAL

## 2022-08-15 DEVICE — CEMENT BONE COBALT G-HV: Type: IMPLANTABLE DEVICE | Site: RIGHT | Status: FUNCTIONAL

## 2022-08-15 DEVICE — INSERT TIB EMPOWR SZ 5 11MM: Type: IMPLANTABLE DEVICE | Site: RIGHT | Status: FUNCTIONAL

## 2022-08-15 DEVICE — CEMENT BONE PALACOS PRO HIGH VISCOSITY 80G W GENTAMICIN: Type: IMPLANTABLE DEVICE | Site: RIGHT | Status: FUNCTIONAL

## 2022-08-15 DEVICE — BONE WAX 2.5GM: Type: IMPLANTABLE DEVICE | Site: RIGHT | Status: FUNCTIONAL

## 2022-08-15 RX ORDER — DEXAMETHASONE 0.5 MG/5ML
8 ELIXIR ORAL ONCE
Refills: 0 | Status: COMPLETED | OUTPATIENT
Start: 2022-08-16 | End: 2022-08-16

## 2022-08-15 RX ORDER — DULOXETINE HYDROCHLORIDE 30 MG/1
20 CAPSULE, DELAYED RELEASE ORAL
Refills: 0 | Status: DISCONTINUED | OUTPATIENT
Start: 2022-08-15 | End: 2022-08-16

## 2022-08-15 RX ORDER — HYDROMORPHONE HYDROCHLORIDE 2 MG/ML
0.25 INJECTION INTRAMUSCULAR; INTRAVENOUS; SUBCUTANEOUS
Refills: 0 | Status: DISCONTINUED | OUTPATIENT
Start: 2022-08-15 | End: 2022-08-15

## 2022-08-15 RX ORDER — SODIUM CHLORIDE 9 MG/ML
500 INJECTION INTRAMUSCULAR; INTRAVENOUS; SUBCUTANEOUS ONCE
Refills: 0 | Status: COMPLETED | OUTPATIENT
Start: 2022-08-15 | End: 2022-08-15

## 2022-08-15 RX ORDER — MEMANTINE HYDROCHLORIDE 10 MG/1
10 TABLET ORAL
Refills: 0 | Status: DISCONTINUED | OUTPATIENT
Start: 2022-08-15 | End: 2022-08-16

## 2022-08-15 RX ORDER — HYDROMORPHONE HYDROCHLORIDE 2 MG/ML
0.5 INJECTION INTRAMUSCULAR; INTRAVENOUS; SUBCUTANEOUS
Refills: 0 | Status: DISCONTINUED | OUTPATIENT
Start: 2022-08-15 | End: 2022-08-16

## 2022-08-15 RX ORDER — METOPROLOL TARTRATE 50 MG
50 TABLET ORAL DAILY
Refills: 0 | Status: DISCONTINUED | OUTPATIENT
Start: 2022-08-15 | End: 2022-08-16

## 2022-08-15 RX ORDER — ONDANSETRON 8 MG/1
4 TABLET, FILM COATED ORAL ONCE
Refills: 0 | Status: DISCONTINUED | OUTPATIENT
Start: 2022-08-15 | End: 2022-08-15

## 2022-08-15 RX ORDER — LEVOTHYROXINE SODIUM 125 MCG
25 TABLET ORAL DAILY
Refills: 0 | Status: DISCONTINUED | OUTPATIENT
Start: 2022-08-15 | End: 2022-08-16

## 2022-08-15 RX ORDER — CHLORHEXIDINE GLUCONATE 213 G/1000ML
1 SOLUTION TOPICAL ONCE
Refills: 0 | Status: COMPLETED | OUTPATIENT
Start: 2022-08-15 | End: 2022-08-15

## 2022-08-15 RX ORDER — TRANEXAMIC ACID 100 MG/ML
1000 INJECTION, SOLUTION INTRAVENOUS ONCE
Refills: 0 | Status: COMPLETED | OUTPATIENT
Start: 2022-08-15 | End: 2022-08-15

## 2022-08-15 RX ORDER — VANCOMYCIN HCL 1 G
1000 VIAL (EA) INTRAVENOUS ONCE
Refills: 0 | Status: COMPLETED | OUTPATIENT
Start: 2022-08-15 | End: 2022-08-15

## 2022-08-15 RX ORDER — SODIUM CHLORIDE 9 MG/ML
1000 INJECTION, SOLUTION INTRAVENOUS
Refills: 0 | Status: DISCONTINUED | OUTPATIENT
Start: 2022-08-15 | End: 2022-08-15

## 2022-08-15 RX ORDER — APREPITANT 80 MG/1
40 CAPSULE ORAL ONCE
Refills: 0 | Status: COMPLETED | OUTPATIENT
Start: 2022-08-15 | End: 2022-08-15

## 2022-08-15 RX ORDER — ATORVASTATIN CALCIUM 80 MG/1
20 TABLET, FILM COATED ORAL AT BEDTIME
Refills: 0 | Status: DISCONTINUED | OUTPATIENT
Start: 2022-08-15 | End: 2022-08-16

## 2022-08-15 RX ORDER — DRONEDARONE 400 MG/1
200 TABLET, FILM COATED ORAL
Refills: 0 | Status: DISCONTINUED | OUTPATIENT
Start: 2022-08-15 | End: 2022-08-16

## 2022-08-15 RX ORDER — SODIUM CHLORIDE 9 MG/ML
1000 INJECTION, SOLUTION INTRAVENOUS
Refills: 0 | Status: DISCONTINUED | OUTPATIENT
Start: 2022-08-16 | End: 2022-08-16

## 2022-08-15 RX ORDER — PANTOPRAZOLE SODIUM 20 MG/1
40 TABLET, DELAYED RELEASE ORAL
Refills: 0 | Status: DISCONTINUED | OUTPATIENT
Start: 2022-08-15 | End: 2022-08-16

## 2022-08-15 RX ORDER — ACETAMINOPHEN 500 MG
1000 TABLET ORAL ONCE
Refills: 0 | Status: COMPLETED | OUTPATIENT
Start: 2022-08-15 | End: 2022-08-15

## 2022-08-15 RX ORDER — CEFAZOLIN SODIUM 1 G
2000 VIAL (EA) INJECTION EVERY 8 HOURS
Refills: 0 | Status: COMPLETED | OUTPATIENT
Start: 2022-08-15 | End: 2022-08-16

## 2022-08-15 RX ORDER — MAGNESIUM HYDROXIDE 400 MG/1
30 TABLET, CHEWABLE ORAL DAILY
Refills: 0 | Status: DISCONTINUED | OUTPATIENT
Start: 2022-08-15 | End: 2022-08-16

## 2022-08-15 RX ORDER — HYDROMORPHONE HYDROCHLORIDE 2 MG/ML
2 INJECTION INTRAMUSCULAR; INTRAVENOUS; SUBCUTANEOUS
Refills: 0 | Status: DISCONTINUED | OUTPATIENT
Start: 2022-08-15 | End: 2022-08-16

## 2022-08-15 RX ORDER — ACETAMINOPHEN 500 MG
1000 TABLET ORAL EVERY 8 HOURS
Refills: 0 | Status: DISCONTINUED | OUTPATIENT
Start: 2022-08-15 | End: 2022-08-16

## 2022-08-15 RX ORDER — LANOLIN ALCOHOL/MO/W.PET/CERES
5 CREAM (GRAM) TOPICAL AT BEDTIME
Refills: 0 | Status: DISCONTINUED | OUTPATIENT
Start: 2022-08-15 | End: 2022-08-16

## 2022-08-15 RX ORDER — CEFAZOLIN SODIUM 1 G
2000 VIAL (EA) INJECTION ONCE
Refills: 0 | Status: COMPLETED | OUTPATIENT
Start: 2022-08-15 | End: 2022-08-15

## 2022-08-15 RX ORDER — LACTOBACILLUS ACIDOPHILUS 100MM CELL
1 CAPSULE ORAL DAILY
Refills: 0 | Status: DISCONTINUED | OUTPATIENT
Start: 2022-08-15 | End: 2022-08-16

## 2022-08-15 RX ORDER — SENNA PLUS 8.6 MG/1
2 TABLET ORAL AT BEDTIME
Refills: 0 | Status: DISCONTINUED | OUTPATIENT
Start: 2022-08-15 | End: 2022-08-16

## 2022-08-15 RX ORDER — POLYETHYLENE GLYCOL 3350 17 G/17G
17 POWDER, FOR SOLUTION ORAL AT BEDTIME
Refills: 0 | Status: DISCONTINUED | OUTPATIENT
Start: 2022-08-15 | End: 2022-08-16

## 2022-08-15 RX ORDER — HYDROMORPHONE HYDROCHLORIDE 2 MG/ML
0.5 INJECTION INTRAMUSCULAR; INTRAVENOUS; SUBCUTANEOUS
Refills: 0 | Status: DISCONTINUED | OUTPATIENT
Start: 2022-08-15 | End: 2022-08-15

## 2022-08-15 RX ORDER — APIXABAN 2.5 MG/1
5 TABLET, FILM COATED ORAL EVERY 12 HOURS
Refills: 0 | Status: DISCONTINUED | OUTPATIENT
Start: 2022-08-17 | End: 2022-08-16

## 2022-08-15 RX ORDER — ONDANSETRON 8 MG/1
4 TABLET, FILM COATED ORAL EVERY 6 HOURS
Refills: 0 | Status: DISCONTINUED | OUTPATIENT
Start: 2022-08-15 | End: 2022-08-16

## 2022-08-15 RX ORDER — MERCAPTOPURINE 50 MG/1
50 TABLET ORAL ONCE
Refills: 0 | Status: COMPLETED | OUTPATIENT
Start: 2022-08-15 | End: 2022-08-15

## 2022-08-15 RX ORDER — CELECOXIB 200 MG/1
200 CAPSULE ORAL EVERY 12 HOURS
Refills: 0 | Status: DISCONTINUED | OUTPATIENT
Start: 2022-08-15 | End: 2022-08-16

## 2022-08-15 RX ORDER — APIXABAN 2.5 MG/1
2.5 TABLET, FILM COATED ORAL EVERY 12 HOURS
Refills: 0 | Status: DISCONTINUED | OUTPATIENT
Start: 2022-08-16 | End: 2022-08-16

## 2022-08-15 RX ORDER — HYDROMORPHONE HYDROCHLORIDE 2 MG/ML
4 INJECTION INTRAMUSCULAR; INTRAVENOUS; SUBCUTANEOUS
Refills: 0 | Status: DISCONTINUED | OUTPATIENT
Start: 2022-08-15 | End: 2022-08-16

## 2022-08-15 RX ADMIN — APREPITANT 40 MILLIGRAM(S): 80 CAPSULE ORAL at 07:25

## 2022-08-15 RX ADMIN — Medication 100 MILLIGRAM(S): at 17:56

## 2022-08-15 RX ADMIN — ATORVASTATIN CALCIUM 20 MILLIGRAM(S): 80 TABLET, FILM COATED ORAL at 20:37

## 2022-08-15 RX ADMIN — MEMANTINE HYDROCHLORIDE 10 MILLIGRAM(S): 10 TABLET ORAL at 17:56

## 2022-08-15 RX ADMIN — DRONEDARONE 200 MILLIGRAM(S): 400 TABLET, FILM COATED ORAL at 17:57

## 2022-08-15 RX ADMIN — HYDROMORPHONE HYDROCHLORIDE 0.5 MILLIGRAM(S): 2 INJECTION INTRAMUSCULAR; INTRAVENOUS; SUBCUTANEOUS at 12:28

## 2022-08-15 RX ADMIN — CELECOXIB 200 MILLIGRAM(S): 200 CAPSULE ORAL at 20:36

## 2022-08-15 RX ADMIN — HYDROMORPHONE HYDROCHLORIDE 0.5 MILLIGRAM(S): 2 INJECTION INTRAMUSCULAR; INTRAVENOUS; SUBCUTANEOUS at 13:00

## 2022-08-15 RX ADMIN — Medication 1000 MILLIGRAM(S): at 16:55

## 2022-08-15 RX ADMIN — Medication 400 MILLIGRAM(S): at 16:23

## 2022-08-15 RX ADMIN — SODIUM CHLORIDE 500 MILLILITER(S): 9 INJECTION INTRAMUSCULAR; INTRAVENOUS; SUBCUTANEOUS at 12:11

## 2022-08-15 RX ADMIN — HYDROMORPHONE HYDROCHLORIDE 0.5 MILLIGRAM(S): 2 INJECTION INTRAMUSCULAR; INTRAVENOUS; SUBCUTANEOUS at 12:25

## 2022-08-15 RX ADMIN — SODIUM CHLORIDE 75 MILLILITER(S): 9 INJECTION, SOLUTION INTRAVENOUS at 12:07

## 2022-08-15 RX ADMIN — MERCAPTOPURINE 50 MILLIGRAM(S): 50 TABLET ORAL at 20:37

## 2022-08-15 RX ADMIN — Medication 250 MILLIGRAM(S): at 20:35

## 2022-08-15 RX ADMIN — HYDROMORPHONE HYDROCHLORIDE 0.5 MILLIGRAM(S): 2 INJECTION INTRAMUSCULAR; INTRAVENOUS; SUBCUTANEOUS at 12:05

## 2022-08-15 RX ADMIN — Medication 1000 MILLIGRAM(S): at 20:37

## 2022-08-15 RX ADMIN — CHLORHEXIDINE GLUCONATE 1 APPLICATION(S): 213 SOLUTION TOPICAL at 07:25

## 2022-08-15 RX ADMIN — Medication 250 MILLIGRAM(S): at 07:29

## 2022-08-15 NOTE — PHYSICAL THERAPY INITIAL EVALUATION ADULT - ADDITIONAL COMMENTS
Pt lives in private home with . Home has no EVELINE and 2 steps inside. Pt has RW at home. Pt was independent prior to surgery.

## 2022-08-15 NOTE — DISCHARGE NOTE PROVIDER - NSDCCPTREATMENT_GEN_ALL_CORE_FT
PRINCIPAL PROCEDURE  Procedure: Total knee replacement  Findings and Treatment: Right       PRINCIPAL PROCEDURE  Procedure: Total knee replacement  Findings and Treatment: Severe DJD right knee.

## 2022-08-15 NOTE — DISCHARGE NOTE PROVIDER - NSDCMRMEDTOKEN_GEN_ALL_CORE_FT
Acidophilus oral capsule: 1 cap(s) orally once a day-4mg  biotin 5000 mcg oral capsule: orally once a day  DULoxetine 20 mg oral delayed release capsule: 1 cap(s) orally 2 times a day  Dyazide 37.5 mg-25 mg oral capsule: 1 cap(s) orally once a day  Eliquis 5 mg oral tablet: 1 tab(s) orally 2 times a day  Melatonin 5 mg oral tablet: 1 tab(s) orally once a day (at bedtime), As Needed  memantine 10 mg oral tablet: 1 tab(s) orally 2 times a day  mercaptopurine 50 mg oral tablet: 1 tab(s) orally once a day  metoprolol succinate 50 mg oral tablet, extended release: 1 tab(s) orally once a day  Multaq: 200 milligram(s) orally 2 times a day  risedronate 35 mg oral delayed release tablet: 1 tab(s) orally once a week  rosuvastatin 5 mg oral tablet: 1 tab(s) orally once a day (at bedtime)  [place] Suppository: 1000 milligram(s) rectally 3 times a week  Synthroid 25 mcg (0.025 mg) oral tablet: 1 tab(s) orally once a day  Tylenol 500 mg oral tablet: 2 tab(s) orally every 6 hours, As Needed   acetaminophen 500 mg oral tablet: 2 tab(s) orally every 8 hours for the next 3 weeks  Acidophilus oral capsule: 1 cap(s) orally once a day-4mg  biotin 5000 mcg oral capsule: orally once a day  celecoxib 100 mg oral capsule: 1 cap(s) orally 2 times a day MDD:2 capsules  DULoxetine 20 mg oral delayed release capsule: 1 cap(s) orally 2 times a day  Dyazide 37.5 mg-25 mg oral capsule: 1 cap(s) orally once a day  Eliquis 5 mg oral tablet: 1 tab(s) orally 2 times a day ( resume August 17)  HYDROmorphone 2 mg oral tablet: 1 tab(s) orally every 4 hours, As Needed - for Moderate to severe pain MDD:6 tabs  [place] #[id number]  Melatonin 5 mg oral tablet: 1 tab(s) orally once a day (at bedtime), As Needed  memantine 10 mg oral tablet: 1 tab(s) orally 2 times a day  mercaptopurine 50 mg oral tablet: 1 tab(s) orally once a day  metoprolol succinate 50 mg oral tablet, extended release: 1 tab(s) orally once a day  Multaq: 200 milligram(s) orally 2 times a day  omeprazole 20 mg oral delayed release capsule: 1 cap(s) orally once a day while taking celebrex  MDD:1 capsule  polyethylene glycol 3350 oral powder for reconstitution: 17 gram(s) orally once a day (at bedtime)  risedronate 35 mg oral delayed release tablet: 1 tab(s) orally once a week  rosuvastatin 5 mg oral tablet: 1 tab(s) orally once a day (at bedtime)  [place] Suppository: 1000 milligram(s) rectally 3 times a week  senna leaf extract oral tablet: 2 tab(s) orally once a day (at bedtime)  Synthroid 25 mcg (0.025 mg) oral tablet: 1 tab(s) orally once a day   acetaminophen 500 mg oral tablet: 2 tab(s) orally every 8 hours for the next 3 weeks  Acidophilus oral capsule: 1 cap(s) orally once a day-4mg  biotin 5000 mcg oral capsule: orally once a day  celecoxib 100 mg oral capsule: 1 cap(s) orally 2 times a day MDD:2 capsules  DULoxetine 20 mg oral delayed release capsule: 1 cap(s) orally 2 times a day  Dyazide 37.5 mg-25 mg oral capsule: 1 cap(s) orally once a day  Eliquis 2.5 mg oral tablet: 1 tab(s) orally once a day (at bedtime)   Eliquis 5 mg oral tablet: 1 tab(s) orally 2 times a day ( resume August 17)  HYDROmorphone 2 mg oral tablet: 1 tab(s) orally every 4 hours, As Needed - for Moderate to severe pain MDD:6 tabs  [place] [facility]#[id number]  Melatonin 5 mg oral tablet: 1 tab(s) orally once a day (at bedtime), As Needed  memantine 10 mg oral tablet: 1 tab(s) orally 2 times a day  mercaptopurine 50 mg oral tablet: 1 tab(s) orally once a day  metoprolol succinate 50 mg oral tablet, extended release: 1 tab(s) orally once a day  Multaq: 200 milligram(s) orally 2 times a day  omeprazole 20 mg oral delayed release capsule: 1 cap(s) orally once a day while taking celebrex  MDD:1 capsule  polyethylene glycol 3350 oral powder for reconstitution: 17 gram(s) orally once a day (at bedtime)  risedronate 35 mg oral delayed release tablet: 1 tab(s) orally once a week  rosuvastatin 5 mg oral tablet: 1 tab(s) orally once a day (at bedtime)  [place] Suppository: 1000 milligram(s) rectally 3 times a week  senna leaf extract oral tablet: 2 tab(s) orally once a day (at bedtime)  Synthroid 25 mcg (0.025 mg) oral tablet: 1 tab(s) orally once a day   acetaminophen 500 mg oral tablet: 2 tab(s) orally every 8 hours for the next 3 weeks  Acidophilus oral capsule: 1 cap(s) orally once a day-4mg  biotin 5000 mcg oral capsule: orally once a day  celecoxib 100 mg oral capsule: 1 cap(s) orally 2 times a day MDD:2 capsules  DULoxetine 20 mg oral delayed release capsule: 1 cap(s) orally 2 times a day  Dyazide 37.5 mg-25 mg oral capsule: 1 cap(s) orally once a day  Eliquis 2.5 mg oral tablet: 1 tab(s) orally once a day (at bedtime)   Eliquis 5 mg oral tablet: 1 tab(s) orally 2 times a day ( resume August 17)  HYDROmorphone 2 mg oral tablet: 1 tab(s) orally every 4 hours, As Needed - for Moderate to severe pain MDD:6 tabs  [place] #[id number]  Melatonin 5 mg oral tablet: 1 tab(s) orally once a day (at bedtime), As Needed  memantine 10 mg oral tablet: 1 tab(s) orally 2 times a day  mercaptopurine 50 mg oral tablet: 1 tab(s) orally once a day  metoprolol succinate 50 mg oral tablet, extended release: 1 tab(s) orally once a day  Multaq: 200 milligram(s) orally 2 times a day  Multaq 400 mg oral tablet: 1 tab(s) orally 2 times a day  omeprazole 20 mg oral delayed release capsule: 1 cap(s) orally once a day while taking celebrex  MDD:1 capsule  polyethylene glycol 3350 oral powder for reconstitution: 17 gram(s) orally once a day (at bedtime)  risedronate 35 mg oral delayed release tablet: 1 tab(s) orally once a week  rosuvastatin 5 mg oral tablet: 1 tab(s) orally once a day (at bedtime)  [place] Suppository: 1000 milligram(s) rectally 3 times a week  senna leaf extract oral tablet: 2 tab(s) orally once a day (at bedtime)  Synthroid 25 mcg (0.025 mg) oral tablet: 1 tab(s) orally once a day

## 2022-08-15 NOTE — DISCHARGE NOTE PROVIDER - NSDCFUSCHEDAPPT_GEN_ALL_CORE_FT
[facility]  ORTHOSURG [address] [facility]  Scheduled Appointment: 08/29/2022    Darrel Huber  [facility]  GASTRO  [address]  Scheduled Appointment: 09/30/2022    Cally Tony  [facility]  ORTHOSURG [address] [facility]  Scheduled Appointment: 10/11/2022

## 2022-08-15 NOTE — CONSULT NOTE ADULT - SUBJECTIVE AND OBJECTIVE BOX
HPI:  77F Osteoporosis, OA, Atrial Fibrillation, HTN, Hypothyroidism, Anxiety/Depression, Dementia, and UC has been combatting pain in right knee for several years which has progressively worsened.  Patient has tried multiple options for pain relief including OTC medication and as well as PT with minimal relief and has undergone elective replacement of right knee successfully.  Patient is currently resting in bed comfortable with good pain control.     REVIEW OF SYSTEMS:  CONSTITUTIONAL: No fever, weight loss, or fatigue  EYES: No eye pain, visual disturbances, or discharge  ENMT:  No difficulty hearing, tinnitus, vertigo; No sinus or throat pain  NECK: No pain or stiffness  RESPIRATORY: No cough, wheezing, chills or hemoptysis; No shortness of breath  CARDIOVASCULAR: No chest pain, palpitations, dizziness, or leg swelling  GASTROINTESTINAL: No abdominal or epigastric pain. No nausea, vomiting, or hematemesis; No diarrhea or constipation. No melena or hematochezia.  GENITOURINARY: No dysuria, frequency, hematuria, or incontinence  NEUROLOGICAL: No headaches, memory loss, loss of strength, numbness, or tremors  MUSCULOSKELETAL: No muscle or back pain      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 1000 milliLiter(s) (75 mL/Hr) IV Continuous <Continuous>    MEDICATIONS  (PRN):  HYDROmorphone  Injectable 0.5 milliGRAM(s) IV Push every 10 minutes PRN Severe Pain (7 - 10)  HYDROmorphone  Injectable 0.25 milliGRAM(s) IV Push every 10 minutes PRN Moderate Pain (4 - 6)  ondansetron Injectable 4 milliGRAM(s) IV Push once PRN Nausea and/or Vomiting      FAMILY HISTORY:  Family history of osteoarthritis (Mother)        Vital Signs Last 24 Hrs  T(C): 36.4 (15 Aug 2022 11:35), Max: 36.6 (15 Aug 2022 07:26)  T(F): 97.6 (15 Aug 2022 11:35), Max: 97.8 (15 Aug 2022 07:26)  HR: 88 (15 Aug 2022 12:15) (70 - 91)  BP: 131/76 (15 Aug 2022 12:15) (102/78 - 132/87)  BP(mean): --  RR: 16 (15 Aug 2022 12:15) (12 - 22)  SpO2: 100% (15 Aug 2022 12:15) (96% - 100%)    Parameters below as of 15 Aug 2022 12:15  Patient On (Oxygen Delivery Method): nasal cannula  O2 Flow (L/min): 2      PHYSICAL EXAM:    GENERAL: NAD, well-developed  HEAD:  Atraumatic, Normocephalic  EYES: EOMI, PERRLA, conjunctiva and sclera clear  ENMT: No tonsillar erythema, exudates, or enlargement; Moist mucous membranes, Good dentition, No lesions  NECK: Supple, No JVD, Normal thyroid  NERVOUS SYSTEM:  Alert & Oriented X3, Good concentration;  CHEST/LUNG: Clear to auscultation bilaterally; No rales, rhonchi, wheezing, or rubs  HEART: Regular rate and rhythm; No murmurs, rubs, or gallops  ABDOMEN: Soft, Nontender, Nondistended; Bowel sounds present  EXTREMITIES:  2+ Peripheral Pulses, No clubbing, cyanosis, or edema      LABS:          PT/INR - ( 15 Aug 2022 07:33 )   PT: 13.2 sec;   INR: 1.15 ratio         PTT - ( 15 Aug 2022 07:33 )  PTT:41.7 sec    CAPILLARY BLOOD GLUCOSE          RADIOLOGY & ADDITIONAL STUDIES:    EKG:   Personally Reviewed:  [ ] YES     Imaging:   Personally Reviewed:  [ ] YES     Consultant(s) Notes Reviewed:      Care Discussed with Consultants/Other Providers:               
History of Present Illness: The patient is a 77 year old female with a history of HTN, OA, osteoporosis, hypothyroidism, dementia, atrial fibrillation s/p ablation who is admitted s/p TKR. She is currently lethargic from anesthesia but has no current complaints. She denies chest pain, shortness of breath, palpitations.    Past Medical/Surgical History:  HTN, OA, osteoporosis, hypothyroidism, dementia, atrial fibrillation s/p ablation    Medications:  Home Medications:  Acidophilus oral capsule: 1 cap(s) orally once a day-4mg (15 Aug 2022 07:48)  biotin 5000 mcg oral capsule: orally once a day (15 Aug 2022 07:48)  DULoxetine 20 mg oral delayed release capsule: 1 cap(s) orally 2 times a day (15 Aug 2022 07:48)  Dyazide 37.5 mg-25 mg oral capsule: 1 cap(s) orally once a day (15 Aug 2022 07:48)  Eliquis 5 mg oral tablet: 1 tab(s) orally 2 times a day (15 Aug 2022 07:48)  Melatonin 5 mg oral tablet: 1 tab(s) orally once a day (at bedtime), As Needed (15 Aug 2022 07:48)  memantine 10 mg oral tablet: 1 tab(s) orally 2 times a day (15 Aug 2022 07:48)  mercaptopurine 50 mg oral tablet: 1 tab(s) orally once a day (15 Aug 2022 07:48)  metoprolol succinate 50 mg oral tablet, extended release: 1 tab(s) orally once a day (15 Aug 2022 07:48)  Multaq: 200 milligram(s) orally 2 times a day (15 Aug 2022 07:48)  risedronate 35 mg oral delayed release tablet: 1 tab(s) orally once a week (15 Aug 2022 07:48)  rosuvastatin 5 mg oral tablet: 1 tab(s) orally once a day (at bedtime) (15 Aug 2022 07:48)  [place] Suppository: 1000 milligram(s) rectally 3 times a week (15 Aug 2022 07:48)  Synthroid 25 mcg (0.025 mg) oral tablet: 1 tab(s) orally once a day (15 Aug 2022 07:48)  Tylenol 500 mg oral tablet: 2 tab(s) orally every 6 hours, As Needed (15 Aug 2022 07:48)      Family History: Non-contributory family history of premature cardiovascular atherosclerotic disease    Social History: No tobacco, alcohol or drug use    Review of Systems:  General: No fevers, chills, weight gain  Skin: No rashes, color changes  Cardiovascular: No chest pain, orthopnea  Respiratory: No shortness of breath, cough  Gastrointestinal: No nausea, abdominal pain  Genitourinary: No incontinence, pain with urination  Musculoskeletal: No pain, swelling, decreased range of motion  Neurological: No headache, weakness  Psychiatric: No depression, anxiety  Endocrine: No weight gain, increased thirst  All other systems are comprehensively negative.    Physical Exam:  Vitals:        Vital Signs Last 24 Hrs  T(C): 36.4 (15 Aug 2022 11:35), Max: 36.6 (15 Aug 2022 07:26)  T(F): 97.6 (15 Aug 2022 11:35), Max: 97.8 (15 Aug 2022 07:26)  HR: 93 (15 Aug 2022 14:00) (70 - 93)  BP: 114/79 (15 Aug 2022 14:00) (102/78 - 132/87)  BP(mean): --  RR: 13 (15 Aug 2022 14:00) (11 - 22)  SpO2: 100% (15 Aug 2022 14:00) (96% - 100%)  Parameters below as of 15 Aug 2022 14:00  Patient On (Oxygen Delivery Method): nasal cannula  O2 Flow (L/min): 2  General: NAD  HEENT: MMM  Neck: No JVD, no carotid bruit  Lungs: CTAB  CV: RRR, nl S1/S2, no M/R/G  Abdomen: S/NT/ND, +BS  Extremities: No LE edema, no cyanosis  Neuro: AAOx3, non-focal  Skin: No rash    Labs:              PT/INR - ( 15 Aug 2022 07:33 )   PT: 13.2 sec;   INR: 1.15 ratio         PTT - ( 15 Aug 2022 07:33 )  PTT:41.7 sec    ECG: AF, nonspecific ST abnormality

## 2022-08-15 NOTE — CONSULT NOTE ADULT - ASSESSMENT
The patient is a 77 year old female with a history of HTN, OA, osteoporosis, hypothyroidism, dementia, atrial fibrillation s/p ablation who is admitted s/p TKR.    Plan:  - Pre-operative ECG and telemetry consistent with atrial fibrillation  - Continue metoprolol succinate 50 mg daily  - Unclear why patient is currently on dronedarone given ongoing atrial fibrillation and also on 200 mg bid - will defer to outpatient cardiologist  - Continue apixaban 5 mg bid (2.5 mg bid today)  - Continue atorvastatin 20 mg daily (formulary equivalent)  - Ortho follow-up  - PT
77F Osteoporosis, OA, Atrial Fibrillation, HTN, Hypothyroidism, Anxiety/Depression, Dementia, and UC admitted for aftercare following Right TKR.     S/P Right TKR  POD 0    Continue Bowel and pain control regimen;    Incentive Spirometer for lung expansion;   Monitor Hgb and follow up electrolytes.      Atrial Fibrillation / Atrial Flutter  Has history and had runs intraoperatively  Resume Multaq and Metoprolol and Eliquis on POD 2   Cardiology consulted     HLD   Statin    Hypothyroidism  Synthroid    Anxiety/Depression  Duloxetine    Ulcerative Colitis  Has history of Colectomy done    Diet  Regular    DVT Prophylaxis   Eliquis prophylactic for first 2 days and transition to therapeutic on POD 2     Disposition  Discharge planning pending hospital course

## 2022-08-15 NOTE — PHYSICAL THERAPY INITIAL EVALUATION ADULT - PERTINENT HX OF CURRENT PROBLEM, REHAB EVAL
76 y/o female with right knee pain for more than 3 years. Pain aggravated  with h/o fall 1 month ago. C/o buckling and loss of strength Takes Tylenol as needed with some relief. Failed conservative therapy and was advised surgery.

## 2022-08-15 NOTE — DISCHARGE NOTE PROVIDER - CARE PROVIDER_API CALL
Cally Tony)  Orthopedics  [address], [address]  [place] [address]  Phone: [phone]  Fax: [phone]  Established Patient  Scheduled Appointment: 08/29/2022 11:00 AM

## 2022-08-15 NOTE — DISCHARGE NOTE PROVIDER - NSDCFUADDINST_GEN_ALL_CORE_FT
Your new total knee requires proper care.  Your care provider is your best resource for care information.  Please follow these basic guidelines.  Use pain medication if needed as prescribed.  Ice packs are a helpful addition to your comfort.  Applying a  waterproof ice pack over a cloth or thin towel to the site for 30 minutes per hour may provide benefit by reducing swelling and discomfort.  Your Physical Therapy/Occupational Therapy may include ambulation, transfers, stairs, ADL's (activities of daily living), range of motion exercises, and isometrics.  Your participation is vital to your recovery.    -Your Activity  • Weight Bearing as tolerated with rolling walker or recommended assistive device.  • Take short, frequent walks increasing the distance that you walk each day as tolerated.  • Change your position every hour to decrease pain and stiffness.  • Continue the exercises taught to you by your physical therapist.  • No driving until cleared by the doctor.  • No tub baths, hot tubs, or swimming pools until instructed by your doctor.  • Do not squat down on the floor.  • Do not kneel or twist your knee.    Keep incision clean and dry.  Salves, ointments or other topical treatments are not to be used on the wound unless sepcifically recommended by your doctor.    You have Prineo (tape/glue) you may shower and pat the wound dry.  Prineo removal is done in the office 2 weeks after surgery.    If you have further questions or problems call your doctor's office or go to the emergency room.  For Constipation :   • Increase your water intake. Drink at least 8 glasses of water daily.  • Try adding fiber to your diet by eating fruits, vegetables and foods that are rich in grains.  • If you do experience constipation, you may take an over-the-counter stool softener/laxative such as Radha Colace, Senekot or  Milk of Magnesia.     Your new total knee requires proper care.  Your care provider is your best resource for care information.  Please follow these basic guidelines.  Use pain medication if needed as prescribed.  Ice packs are a helpful addition to your comfort.  Applying a  waterproof ice pack over a cloth or thin towel to the site for 30 minutes per hour may provide benefit by reducing swelling and discomfort.  Your Physical Therapy/Occupational Therapy may include ambulation, transfers, stairs, ADL's (activities of daily living), range of motion exercises, and isometrics.  Your participation is vital to your recovery.    -Your Activity  • Weight Bearing as tolerated with rolling walker or recommended assistive device.  • Take short, frequent walks increasing the distance that you walk each day as tolerated.  • Change your position every hour to decrease pain and stiffness.  • Continue the exercises taught to you by your physical therapist.  • No driving until cleared by the doctor.  • No tub baths, hot tubs, or swimming pools until instructed by your doctor.  • Do not squat down on the floor.  • Do not kneel or twist your knee.    Keep incision clean and dry.  Salves, ointments or other topical treatments are not to be used on the wound unless sepcifically recommended by your doctor.    You have Prineo (tape/glue) you may shower and pat the wound dry.  Prineo removal is done in the office 2 weeks after surgery.    If you have further questions or problems call your doctor's office or go to the emergency room.  For Constipation :   • Increase your water intake. Drink at least 8 glasses of water daily.  • Try adding fiber to your diet by eating fruits, vegetables and foods that are rich in grains.  • If you do experience constipation, you may take an over-the-counter stool softener/laxative such as Radha Colace, Senokot or  Milk of Magnesia.

## 2022-08-15 NOTE — DISCHARGE NOTE PROVIDER - NSDCCPCAREPLAN_GEN_ALL_CORE_FT
PRINCIPAL DISCHARGE DIAGNOSIS  Diagnosis: Primary localized osteoarthritis of right knee  Assessment and Plan of Treatment:        PRINCIPAL DISCHARGE DIAGNOSIS  Diagnosis: Primary localized osteoarthritis of right knee  Assessment and Plan of Treatment: Right TKR

## 2022-08-15 NOTE — DISCHARGE NOTE PROVIDER - HOSPITAL COURSE
The patient is a 77 y.o. female with severe osteoarthritis of the right knee.  After admission on August 15, 2022 she received standard pre-operative medications including parenteral prophylactic antibiotics.  The patient  then underwent an uncomplicated right total knee replacement by orthopedic surgeon Dr. KEVIN Tony.  She developed aflutter intra operatively but remained stable throughout the procedure.   Medical consultations from the Hospitalist service and cardiology were obtained for post-operative medical co-management. Typical Physical & occupational therapy modalities post TKR were performed including ambulation training, range of motion, ADL's, and transfers. Eliquis was given for DVT prophylaxis at 2.5 mg bid POD#1 then 5 mg bid to resume her chronic therapy.  The patient had a clean appearing surgical incision with prineo dressing intact with no sign of surgical site infections.  She had a stable neuro / vascular exam of the operated extremity.  After progression of mobility guided by the PT/ OT staff,  the patient was felt to benefit from further rehabilitative care to increase their level of function. This was felt to best be accomplished in a home setting.  Discharge and Orthopedic Care instructions were delineated in the Discharge Plan and reviewed with the patient. All medications were delineated in the medication reconciliation tool and key points were reviewed with the patient. She was deemed stable from an Orthopedic & medical standpoint for discharge today.  Upon completion of Home care she will be  following up with Dr. Tony for office  follow up Orthopedic care in approximately 14 days.

## 2022-08-15 NOTE — PHYSICAL THERAPY INITIAL EVALUATION ADULT - GAIT DEVIATIONS NOTED, PT EVAL
decreased rachel/decreased velocity of limb motion/decreased step length/decreased weight-shifting ability

## 2022-08-15 NOTE — DISCHARGE NOTE PROVIDER - PROVIDER TOKENS
PROVIDER:[TOKEN:[[id number]],SCHEDULEDAPPT:[08/29/2022],SCHEDULEDAPPTTIME:[11:00 AM],ESTABLISHEDPATIENT:[T]]

## 2022-08-16 ENCOUNTER — TRANSCRIPTION ENCOUNTER (OUTPATIENT)
Age: 77
End: 2022-08-16

## 2022-08-16 VITALS — DIASTOLIC BLOOD PRESSURE: 72 MMHG | SYSTOLIC BLOOD PRESSURE: 112 MMHG

## 2022-08-16 LAB
ANION GAP SERPL CALC-SCNC: 9 MMOL/L — SIGNIFICANT CHANGE UP (ref 5–17)
BUN SERPL-MCNC: 17 MG/DL — SIGNIFICANT CHANGE UP (ref 7–23)
CALCIUM SERPL-MCNC: 8.4 MG/DL — SIGNIFICANT CHANGE UP (ref 8.4–10.5)
CHLORIDE SERPL-SCNC: 97 MMOL/L — SIGNIFICANT CHANGE UP (ref 96–108)
CO2 SERPL-SCNC: 28 MMOL/L — SIGNIFICANT CHANGE UP (ref 22–31)
CREAT SERPL-MCNC: 0.84 MG/DL — SIGNIFICANT CHANGE UP (ref 0.5–1.3)
EGFR: 71 ML/MIN/1.73M2 — SIGNIFICANT CHANGE UP
GLUCOSE SERPL-MCNC: 100 MG/DL — HIGH (ref 70–99)
HCT VFR BLD CALC: 34.3 % — LOW (ref 34.5–45)
HGB BLD-MCNC: 11 G/DL — LOW (ref 11.5–15.5)
MCHC RBC-ENTMCNC: 29.6 PG — SIGNIFICANT CHANGE UP (ref 27–34)
MCHC RBC-ENTMCNC: 32.1 GM/DL — SIGNIFICANT CHANGE UP (ref 32–36)
MCV RBC AUTO: 92.5 FL — SIGNIFICANT CHANGE UP (ref 80–100)
NRBC # BLD: 0 /100 WBCS — SIGNIFICANT CHANGE UP (ref 0–0)
PLATELET # BLD AUTO: 204 K/UL — SIGNIFICANT CHANGE UP (ref 150–400)
POTASSIUM SERPL-MCNC: 3.6 MMOL/L — SIGNIFICANT CHANGE UP (ref 3.5–5.3)
POTASSIUM SERPL-SCNC: 3.6 MMOL/L — SIGNIFICANT CHANGE UP (ref 3.5–5.3)
RBC # BLD: 3.71 M/UL — LOW (ref 3.8–5.2)
RBC # FLD: 14.2 % — SIGNIFICANT CHANGE UP (ref 10.3–14.5)
SODIUM SERPL-SCNC: 134 MMOL/L — LOW (ref 135–145)
WBC # BLD: 11.56 K/UL — HIGH (ref 3.8–10.5)
WBC # FLD AUTO: 11.56 K/UL — HIGH (ref 3.8–10.5)

## 2022-08-16 PROCEDURE — 80048 BASIC METABOLIC PNL TOTAL CA: CPT

## 2022-08-16 PROCEDURE — 99233 SBSQ HOSP IP/OBS HIGH 50: CPT

## 2022-08-16 PROCEDURE — 85610 PROTHROMBIN TIME: CPT

## 2022-08-16 PROCEDURE — 97116 GAIT TRAINING THERAPY: CPT

## 2022-08-16 PROCEDURE — 85730 THROMBOPLASTIN TIME PARTIAL: CPT

## 2022-08-16 PROCEDURE — C1889: CPT

## 2022-08-16 PROCEDURE — 97161 PT EVAL LOW COMPLEX 20 MIN: CPT

## 2022-08-16 PROCEDURE — C1713: CPT

## 2022-08-16 PROCEDURE — 36415 COLL VENOUS BLD VENIPUNCTURE: CPT

## 2022-08-16 PROCEDURE — 85027 COMPLETE CBC AUTOMATED: CPT

## 2022-08-16 PROCEDURE — 97530 THERAPEUTIC ACTIVITIES: CPT

## 2022-08-16 PROCEDURE — 88305 TISSUE EXAM BY PATHOLOGIST: CPT

## 2022-08-16 PROCEDURE — 73560 X-RAY EXAM OF KNEE 1 OR 2: CPT

## 2022-08-16 PROCEDURE — 97110 THERAPEUTIC EXERCISES: CPT

## 2022-08-16 PROCEDURE — 88311 DECALCIFY TISSUE: CPT

## 2022-08-16 PROCEDURE — 27447 TOTAL KNEE ARTHROPLASTY: CPT

## 2022-08-16 PROCEDURE — 97165 OT EVAL LOW COMPLEX 30 MIN: CPT

## 2022-08-16 PROCEDURE — C1776: CPT

## 2022-08-16 RX ORDER — APIXABAN 2.5 MG/1
1 TABLET, FILM COATED ORAL
Qty: 1 | Refills: 0
Start: 2022-08-16 | End: 2022-08-16

## 2022-08-16 RX ORDER — SENNA PLUS 8.6 MG/1
2 TABLET ORAL
Qty: 0 | Refills: 0 | DISCHARGE
Start: 2022-08-16

## 2022-08-16 RX ORDER — POLYETHYLENE GLYCOL 3350 17 G/17G
17 POWDER, FOR SOLUTION ORAL
Qty: 0 | Refills: 0 | DISCHARGE
Start: 2022-08-16

## 2022-08-16 RX ORDER — CELECOXIB 200 MG/1
1 CAPSULE ORAL
Qty: 60 | Refills: 0
Start: 2022-08-16 | End: 2022-09-14

## 2022-08-16 RX ORDER — HYDROMORPHONE HYDROCHLORIDE 2 MG/ML
1 INJECTION INTRAMUSCULAR; INTRAVENOUS; SUBCUTANEOUS
Qty: 42 | Refills: 0
Start: 2022-08-16

## 2022-08-16 RX ORDER — APIXABAN 2.5 MG/1
1 TABLET, FILM COATED ORAL
Qty: 0 | Refills: 0 | DISCHARGE

## 2022-08-16 RX ORDER — DRONEDARONE 400 MG/1
400 TABLET, FILM COATED ORAL
Refills: 0 | Status: DISCONTINUED | OUTPATIENT
Start: 2022-08-16 | End: 2022-08-16

## 2022-08-16 RX ORDER — ACETAMINOPHEN 500 MG
2 TABLET ORAL
Qty: 0 | Refills: 0 | DISCHARGE

## 2022-08-16 RX ORDER — OMEPRAZOLE 10 MG/1
1 CAPSULE, DELAYED RELEASE ORAL
Qty: 30 | Refills: 1
Start: 2022-08-16 | End: 2022-10-14

## 2022-08-16 RX ORDER — DRONEDARONE 400 MG/1
1 TABLET, FILM COATED ORAL
Qty: 60 | Refills: 0
Start: 2022-08-16 | End: 2022-09-14

## 2022-08-16 RX ORDER — CELECOXIB 200 MG/1
100 CAPSULE ORAL
Refills: 0 | Status: DISCONTINUED | OUTPATIENT
Start: 2022-08-16 | End: 2022-08-16

## 2022-08-16 RX ORDER — ACETAMINOPHEN 500 MG
2 TABLET ORAL
Qty: 0 | Refills: 0 | DISCHARGE
Start: 2022-08-16

## 2022-08-16 RX ADMIN — Medication 1000 MILLIGRAM(S): at 13:35

## 2022-08-16 RX ADMIN — Medication 1000 MILLIGRAM(S): at 13:02

## 2022-08-16 RX ADMIN — Medication 1000 MILLIGRAM(S): at 09:15

## 2022-08-16 RX ADMIN — DULOXETINE HYDROCHLORIDE 20 MILLIGRAM(S): 30 CAPSULE, DELAYED RELEASE ORAL at 08:40

## 2022-08-16 RX ADMIN — Medication 1 TABLET(S): at 13:01

## 2022-08-16 RX ADMIN — Medication 101.6 MILLIGRAM(S): at 05:29

## 2022-08-16 RX ADMIN — PANTOPRAZOLE SODIUM 40 MILLIGRAM(S): 20 TABLET, DELAYED RELEASE ORAL at 08:40

## 2022-08-16 RX ADMIN — Medication 1000 MILLIGRAM(S): at 08:39

## 2022-08-16 RX ADMIN — APIXABAN 2.5 MILLIGRAM(S): 2.5 TABLET, FILM COATED ORAL at 08:40

## 2022-08-16 RX ADMIN — Medication 50 MILLIGRAM(S): at 08:40

## 2022-08-16 RX ADMIN — Medication 100 MILLIGRAM(S): at 02:05

## 2022-08-16 RX ADMIN — Medication 25 MICROGRAM(S): at 08:40

## 2022-08-16 RX ADMIN — MEMANTINE HYDROCHLORIDE 10 MILLIGRAM(S): 10 TABLET ORAL at 08:39

## 2022-08-16 RX ADMIN — SODIUM CHLORIDE 75 MILLILITER(S): 9 INJECTION, SOLUTION INTRAVENOUS at 05:28

## 2022-08-16 NOTE — PHARMACOTHERAPY INTERVENTION NOTE - COMMENTS
Admission medication reconciliation POD1 
Dronedarone is a CY inhibitor and both oxycodone and tramadol are CY substrates. Concurrent administration of bupropion and oxycodone can increase serum levels of oxycodone and puts the patient at increased risk of life-threatening or fatal respiratory depression. Hydromorphone for PRN pain.  
Transition of Care video discharge education - medication calendar given to patient Pharmacy fill confirmed.

## 2022-08-16 NOTE — PROGRESS NOTE ADULT - ASSESSMENT
The patient is a 77 year old female with a history of HTN, OA, osteoporosis, hypothyroidism, dementia, atrial fibrillation s/p ablation who is admitted s/p TKR.    Plan:  - Pre-operative ECG and telemetry consistent with atrial fibrillation  - Continue metoprolol succinate 50 mg daily  - Will increase dronedarone to 400 mg bid - if patient remains in AF when she follows up with her cardiologist likely should just discontinue at that point  - Continue apixaban 5 mg bid (2.5 mg bid today)  - Continue atorvastatin 20 mg daily (formulary equivalent)  - Ortho follow-up  - PT

## 2022-08-16 NOTE — DISCHARGE NOTE NURSING/CASE MANAGEMENT/SOCIAL WORK - PATIENT PORTAL LINK FT
You can access the FollowMyHealth Patient Portal offered by [facility] by registering at the following website: [email or web address]. By joining [facility]’s FollowMyHealth portal, you will also be able to view your health information using other applications (apps) compatible with our system.

## 2022-08-16 NOTE — DISCHARGE NOTE NURSING/CASE MANAGEMENT/SOCIAL WORK - NSSCCONTNUM_GEN_ALL_CORE
Please contact the home care agency at  [phone] or  [phone] if you have not heard from them by 10 AM on the day after your hospital discharge.

## 2022-08-16 NOTE — PROGRESS NOTE ADULT - ASSESSMENT
77F Osteoporosis, OA, Atrial Fibrillation, HTN, Hypothyroidism, Anxiety/Depression, Dementia, and UC admitted for aftercare following Right TKR.     S/P Right TKR  POD 1  Continue Bowel and pain control regimen;    Incentive Spirometer for lung expansion;   Monitor Hgb and follow up electrolytes.      Atrial Fibrillation / Atrial Flutter  Has history and had runs intraoperatively  Dose of Multaq increased from 200 to 400  Metoprolol and Eliquis on POD 2   Cardiology consulted     HLD   Statin    Hypothyroidism  Synthroid    Anxiety/Depression  Duloxetine    Ulcerative Colitis  Has history of Colectomy done    Diet  Regular    DVT Prophylaxis   Eliquis prophylactic for first 2 days and transition to therapeutic on POD 2     Disposition  Patient medically optimized for discharge home if cleared by PT and Ortho. 77F Osteoporosis, OA, Atrial Fibrillation, HTN, Hypothyroidism, Anxiety/Depression, Dementia, and UC admitted for aftercare following Right TKR.     S/P Right TKR  POD 1  Continue Bowel and pain control regimen;    Incentive Spirometer for lung expansion;   Monitor Hgb and follow up electrolytes.      Atrial Fibrillation / Atrial Flutter  Has history and had runs intraoperatively  Dose of Multaq increased from 200 to 400 (Prescription sent to [facility])  Metoprolol and Eliquis on POD 2   Cardiology consulted     HLD   Statin    Hypothyroidism  Synthroid    Anxiety/Depression  Duloxetine    Ulcerative Colitis  Has history of Colectomy done    Diet  Regular    DVT Prophylaxis   Eliquis prophylactic for first 2 days and transition to therapeutic on POD 2     Disposition  Patient medically optimized for discharge home if cleared by PT and Ortho.

## 2022-08-16 NOTE — OCCUPATIONAL THERAPY INITIAL EVALUATION ADULT - NSOTDISCHREC_GEN_A_CORE
Recommend supervision/assist with functional activities which pt states  will provide./No skilled OT needs

## 2022-08-16 NOTE — DISCHARGE NOTE NURSING/CASE MANAGEMENT/SOCIAL WORK - NSDCPEFALRISK_GEN_ALL_CORE
For information on Fall & Injury Prevention, visit: [email or web address] OR  [email or web address] OR  https://www.cdc.gov/steadi/patient.html

## 2022-08-16 NOTE — OCCUPATIONAL THERAPY INITIAL EVALUATION ADULT - LIVES WITH, PROFILE
Pt lives in a private home with her  with no EVELINE and 2 stairs inside. Pt owns a RW, cane, and commode. Pt has a walk-in shower./spouse

## 2022-08-16 NOTE — PROGRESS NOTE ADULT - SUBJECTIVE AND OBJECTIVE BOX
Subjective: Patient seen and examined. No overnight events Doing well.  Tele Reviewed.     MEDICATIONS  (STANDING):  acetaminophen     Tablet .. 1000 milliGRAM(s) Oral every 8 hours  apixaban 2.5 milliGRAM(s) Oral every 12 hours  atorvastatin 20 milliGRAM(s) Oral at bedtime  celecoxib 100 milliGRAM(s) Oral two times a day  dronedarone 400 milliGRAM(s) Oral two times a day  DULoxetine 20 milliGRAM(s) Oral two times a day  lactated ringers. 1000 milliLiter(s) (75 mL/Hr) IV Continuous <Continuous>  lactobacillus acidophilus 1 Tablet(s) Oral daily  levothyroxine 25 MICROGram(s) Oral daily  memantine 10 milliGRAM(s) Oral two times a day  metoprolol succinate ER 50 milliGRAM(s) Oral daily  pantoprazole    Tablet 40 milliGRAM(s) Oral before breakfast  polyethylene glycol 3350 17 Gram(s) Oral at bedtime  senna 2 Tablet(s) Oral at bedtime    MEDICATIONS  (PRN):  HYDROmorphone   Tablet 2 milliGRAM(s) Oral every 3 hours PRN Moderate Pain (4 - 6)  HYDROmorphone   Tablet 4 milliGRAM(s) Oral every 3 hours PRN Severe Pain (7 - 10)  HYDROmorphone  Injectable 0.5 milliGRAM(s) IV Push every 3 hours PRN breakthrough  magnesium hydroxide Suspension 30 milliLiter(s) Oral daily PRN Constipation  melatonin 5 milliGRAM(s) Oral at bedtime PRN Insomnia  ondansetron Injectable 4 milliGRAM(s) IV Push every 6 hours PRN Nausea and/or Vomiting      Allergies    No Known Allergies    Intolerances    codeine (Vomiting; Nausea)  meperidine (Vomiting; Nausea)      Vital Signs Last 24 Hrs  T(C): 36.9 (16 Aug 2022 07:34), Max: 36.9 (15 Aug 2022 23:54)  T(F): 98.5 (16 Aug 2022 07:34), Max: 98.5 (15 Aug 2022 23:54)  HR: 60 (16 Aug 2022 07:34) (60 - 93)  BP: 110/70 (16 Aug 2022 07:34) (83/54 - 131/76)  BP(mean): --  RR: 20 (16 Aug 2022 07:34) (11 - 22)  SpO2: 97% (16 Aug 2022 07:34) (94% - 100%)    Parameters below as of 16 Aug 2022 07:34  Patient On (Oxygen Delivery Method): room air        PHYSICAL EXAM:  GENERAL: NAD, well-groomed, well-developed  HEAD:  Atraumatic, Normocephalic  ENMT: Moist mucous membranes,   NECK: Supple, No JVD, Normal thyroid  NERVOUS SYSTEM:  All 4 extremities mobile, no gross sensory deficits.   CHEST/LUNG: Clear to auscultation bilaterally; No rales, rhonchi, wheezing, or rubs  HEART: Regular rate and rhythm; No murmurs, rubs, or gallops  ABDOMEN: Soft, Nontender, Nondistended; Bowel sounds present  EXTREMITIES:  2+ Peripheral Pulses, No clubbing, cyanosis, or edema      LABS:                        11.0   11.56 )-----------( 204      ( 16 Aug 2022 08:18 )             34.3     16 Aug 2022 08:18    134    |  97     |  17     ----------------------------<  100    3.6     |  28     |  0.84     Ca    8.4        16 Aug 2022 08:18      PT/INR - ( 15 Aug 2022 07:33 )   PT: 13.2 sec;   INR: 1.15 ratio         PTT - ( 15 Aug 2022 07:33 )  PTT:41.7 sec    CAPILLARY BLOOD GLUCOSE          RADIOLOGY & ADDITIONAL TESTS:    Imaging Personally Reviewed:  [ ] YES     Consultant(s) Notes Reviewed:      Care Discussed with Consultants/Other Providers:    Advanced Directives: [ ] DNR  [ ] No feeding tube  [ ] MOLST in chart  [ ] MOLST completed today  [ ] Unknown  
Chief Complaint: TKR    Interval Events: No events overnight.    Review of Systems:  General: No fevers, chills, weight gain  Skin: No rashes, color changes  Cardiovascular: No chest pain, orthopnea  Respiratory: No shortness of breath, cough  Gastrointestinal: No nausea, abdominal pain  Genitourinary: No incontinence, pain with urination  Musculoskeletal: No pain, swelling, decreased range of motion  Neurological: No headache, weakness  Psychiatric: No depression, anxiety  Endocrine: No weight gain, increased thirst  All other systems are comprehensively negative.    Physical Exam:  Vitals:        Vital Signs Last 24 Hrs  T(C): 36.9 (16 Aug 2022 07:34), Max: 36.9 (15 Aug 2022 23:54)  T(F): 98.5 (16 Aug 2022 07:34), Max: 98.5 (15 Aug 2022 23:54)  HR: 60 (16 Aug 2022 07:34) (60 - 93)  BP: 110/70 (16 Aug 2022 07:34) (83/54 - 131/76)  BP(mean): --  RR: 20 (16 Aug 2022 07:34) (11 - 22)  SpO2: 97% (16 Aug 2022 07:34) (94% - 100%)  Parameters below as of 16 Aug 2022 07:34  Patient On (Oxygen Delivery Method): room air  General: NAD  HEENT: MMM  Neck: No JVD, no carotid bruit  Lungs: CTAB  CV: RRR, nl S1/S2, no M/R/G  Abdomen: S/NT/ND, +BS  Extremities: No LE edema, no cyanosis  Neuro: AAOx3, non-focal  Skin: No rash    Labs:                        11.0   11.56 )-----------( 204      ( 16 Aug 2022 08:18 )             34.3     08-16    134<L>  |  97  |  17  ----------------------------<  100<H>  3.6   |  28  |  0.84    Ca    8.4      16 Aug 2022 08:18          PT/INR - ( 15 Aug 2022 07:33 )   PT: 13.2 sec;   INR: 1.15 ratio         PTT - ( 15 Aug 2022 07:33 )  PTT:41.7 sec  
Discharge medication calendar:  (Eliquis 5mg q12h preop)  Eliquis 2.5mg q12h POD1 then Eliquis 5mg q12h  APAP 1000mg q8h x 2-3 weeks  Celecoxib 100mg q12h x 2-3 weeks  Omeprazole 20mg QAM x 4 weeks  Narcotic PRN  Docusate 100mg TID while taking narcotic  Miralax, Senna, or Bisacodyl PRN for treatment of constipation  
DINA STANFORD  [id number]    Pt is a 77y year old Female s/p right TKR. Right knee pain is 1/10 at rest. (+) Voids, tolerating regular diet. Denies chest pain/shortness of breath/nausea/vomitting.     Vital Signs Last 24 Hrs  T(C): 36.9 (16 Aug 2022 07:34), Max: 36.9 (15 Aug 2022 23:54)  T(F): 98.5 (16 Aug 2022 07:34), Max: 98.5 (15 Aug 2022 23:54)  HR: 60 (16 Aug 2022 07:34) (60 - 93)  BP: 110/70 (16 Aug 2022 07:34) (83/54 - 131/76)  BP(mean): --  RR: 20 (16 Aug 2022 07:34) (11 - 22)  SpO2: 97% (16 Aug 2022 07:34) (94% - 100%)    Parameters below as of 16 Aug 2022 07:34  Patient On (Oxygen Delivery Method): room air          08-15 @ 07:01  -  08-16 @ 07:00  --------------------------------------------------------  IN: 2250 mL / OUT: 100 mL / NET: 2150 mL                              12.4   11.49 )-----------( 228      ( 15 Aug 2022 16:00 )             38.1     08-15    133<L>  |  97  |  15  ----------------------------<  134<H>  3.8   |  28  |  1.14    Ca    8.7      15 Aug 2022 16:00          PE:   RLE: Prineo dressing CDI, SILT distally, (+2) DP/PT pulses, EHL/FHL/TA intact, Capillary refill < 2 seconds.  negative calf tenderness. PAS on  CPM:    A: 77y year old Female s/p right TKR POD#1    Plan:   -DVT ppx = Eliquis 2.5 mg twice today then resume 5 mg bid tomorrow  -PT/OT = OOB. TKR protocols  -Pain control excellent at this time, patient told to request medication if needed  -Medicine to follow for recurrent Atrial arrhythmia   -continue to follow Labs as needed  -Incentive spirometry, continue use of PAS  -Dispo: plan for home today if cleared by medicine

## 2022-08-17 ENCOUNTER — EMERGENCY (EMERGENCY)
Facility: HOSPITAL | Age: 77
LOS: 0 days | Discharge: HOME | End: 2022-08-17
Attending: STUDENT IN AN ORGANIZED HEALTH CARE EDUCATION/TRAINING PROGRAM
Payer: MEDICARE

## 2022-08-17 VITALS
HEART RATE: 99 BPM | SYSTOLIC BLOOD PRESSURE: 124 MMHG | RESPIRATION RATE: 16 BRPM | OXYGEN SATURATION: 100 % | TEMPERATURE: 98 F | DIASTOLIC BLOOD PRESSURE: 82 MMHG

## 2022-08-17 VITALS
WEIGHT: 139.99 LBS | TEMPERATURE: 98 F | RESPIRATION RATE: 16 BRPM | DIASTOLIC BLOOD PRESSURE: 92 MMHG | HEART RATE: 91 BPM | SYSTOLIC BLOOD PRESSURE: 134 MMHG | HEIGHT: 61 IN

## 2022-08-17 DIAGNOSIS — Z90.711 ACQUIRED ABSENCE OF UTERUS WITH REMAINING CERVICAL STUMP: Chronic | ICD-10-CM
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
## 2022-08-17 DIAGNOSIS — M19.90 UNSPECIFIED OSTEOARTHRITIS, UNSPECIFIED SITE: ICD-10-CM

## 2022-08-17 DIAGNOSIS — G47.00 INSOMNIA, UNSPECIFIED: ICD-10-CM

## 2022-08-17 DIAGNOSIS — K51.90 ULCERATIVE COLITIS, UNSPECIFIED, WITHOUT COMPLICATIONS: ICD-10-CM

## 2022-08-17 DIAGNOSIS — M48.00 SPINAL STENOSIS, SITE UNSPECIFIED: ICD-10-CM

## 2022-08-17 DIAGNOSIS — Z98.890 OTHER SPECIFIED POSTPROCEDURAL STATES: Chronic | ICD-10-CM

## 2022-08-17 DIAGNOSIS — E03.9 HYPOTHYROIDISM, UNSPECIFIED: ICD-10-CM

## 2022-08-17 DIAGNOSIS — F41.8 OTHER SPECIFIED ANXIETY DISORDERS: ICD-10-CM

## 2022-08-17 DIAGNOSIS — S09.90XA UNSPECIFIED INJURY OF HEAD, INITIAL ENCOUNTER: ICD-10-CM

## 2022-08-17 DIAGNOSIS — F03.90 UNSPECIFIED DEMENTIA WITHOUT BEHAVIORAL DISTURBANCE: ICD-10-CM

## 2022-08-17 DIAGNOSIS — Z96.651 PRESENCE OF RIGHT ARTIFICIAL KNEE JOINT: ICD-10-CM

## 2022-08-17 DIAGNOSIS — M81.0 AGE-RELATED OSTEOPOROSIS WITHOUT CURRENT PATHOLOGICAL FRACTURE: ICD-10-CM
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
## 2022-08-17 DIAGNOSIS — Z90.89 ACQUIRED ABSENCE OF OTHER ORGANS: Chronic | ICD-10-CM

## 2022-08-17 DIAGNOSIS — Z79.01 LONG TERM (CURRENT) USE OF ANTICOAGULANTS: ICD-10-CM

## 2022-08-17 DIAGNOSIS — Z98.1 ARTHRODESIS STATUS: Chronic | ICD-10-CM

## 2022-08-17 DIAGNOSIS — M43.10 SPONDYLOLISTHESIS, SITE UNSPECIFIED: ICD-10-CM

## 2022-08-17 DIAGNOSIS — Z88.5 ALLERGY STATUS TO NARCOTIC AGENT: ICD-10-CM

## 2022-08-17 DIAGNOSIS — I48.91 UNSPECIFIED ATRIAL FIBRILLATION: ICD-10-CM

## 2022-08-17 DIAGNOSIS — Z20.822 CONTACT WITH AND (SUSPECTED) EXPOSURE TO COVID-19: ICD-10-CM

## 2022-08-17 DIAGNOSIS — I10 ESSENTIAL (PRIMARY) HYPERTENSION: ICD-10-CM

## 2022-08-17 DIAGNOSIS — Z90.49 ACQUIRED ABSENCE OF OTHER SPECIFIED PARTS OF DIGESTIVE TRACT: Chronic | ICD-10-CM

## 2022-08-17 DIAGNOSIS — Z90.710 ACQUIRED ABSENCE OF BOTH CERVIX AND UTERUS: ICD-10-CM

## 2022-08-17 DIAGNOSIS — M41.9 SCOLIOSIS, UNSPECIFIED: ICD-10-CM

## 2022-08-17 PROCEDURE — U0003: CPT

## 2022-08-17 PROCEDURE — 99284 EMERGENCY DEPT VISIT MOD MDM: CPT | Mod: 25

## 2022-08-17 PROCEDURE — U0005: CPT

## 2022-08-17 PROCEDURE — 82962 GLUCOSE BLOOD TEST: CPT

## 2022-08-17 PROCEDURE — 70450 CT HEAD/BRAIN W/O DYE: CPT | Mod: MA

## 2022-08-17 PROCEDURE — 70450 CT HEAD/BRAIN W/O DYE: CPT | Mod: 26,MA

## 2022-08-17 PROCEDURE — 99284 EMERGENCY DEPT VISIT MOD MDM: CPT

## 2022-08-17 RX ORDER — APIXABAN 2.5 MG/1
1 TABLET, FILM COATED ORAL
Qty: 0 | Refills: 0 | DISCHARGE
Start: 2022-08-17

## 2022-08-17 NOTE — ED ADULT NURSE NOTE - NSIMPLEMENTINTERV_GEN_ALL_ED
Implemented All Fall with Harm Risk Interventions:  [place] to call system. Call bell, personal items and telephone within reach. Instruct patient to call for assistance. Room bathroom lighting operational. Non-slip footwear when patient is off stretcher. Physically safe environment: no spills, clutter or unnecessary equipment. Stretcher in lowest position, wheels locked, appropriate side rails in place. Provide visual cue, wrist band, yellow gown, etc. Monitor gait and stability. Monitor for mental status changes and reorient to person, place, and time. Review medications for side effects contributing to fall risk. Reinforce activity limits and safety measures with patient and family. Provide visual clues: red socks.

## 2022-08-17 NOTE — ED ADULT TRIAGE NOTE - CHIEF COMPLAINT QUOTE
pt. c/o head injury. Pt states she had right knee replacement and was dc yesterday from [facility]. Pt. resumed eliquis yesterday. pt. felt dizzy getting up this AM and fell hitting right side of head.

## 2022-08-17 NOTE — ED PROVIDER NOTE - NSICDXPASTMEDICALHX_GEN_ALL_CORE_FT
PAST MEDICAL HISTORY:  Anxiety and depression     Atrial fibrillation on Eliquis    Depression     H/O insomnia     H/O osteoporosis     History of osteoarthritis right knee    Hypertension     Hypothyroid     Lumbar disc disorder     Mild dementia     Osteoporosis     Scoliosis     Spinal stenosis     Spondylolisthesis     Ulcerative colitis signs of precancer had partial colectomy

## 2022-08-17 NOTE — ED ADULT NURSE NOTE - CHIEF COMPLAINT QUOTE
pt. c/o head injury. Pt states she had right knee replacement and was dc yesterday from [facility]. Pt. resumed eliquis yesterday. pt. felt dizzy getting up this AM and fell hitting right side of head.

## 2022-08-17 NOTE — ED PROVIDER NOTE - CLINICAL SUMMARY MEDICAL DECISION MAKING FREE TEXT BOX
78 y/o F w/ PMH as above presenting w/ fall and head strike. Pt overall well appearing, no acute distress. Pt did take dose of oxycodone before fall, possible contributing to feeling dizzy. Reports feeling back to baseline now. Possible due to deconditioning from hospital stay. Neuro intact on exam. Will obtain CTH to eval for acute pathology. Will reassess the need for additional interventions as clinically warranted.

## 2022-08-17 NOTE — ED PROVIDER NOTE - NS_ ATTENDINGSCRIBEDETAILS _ED_A_ED_FT
Attending Giovanny: The scribe's documentation has been prepared under my direction and personally reviewed by me in its entirety. I confirm that the note above accurately reflects all work, treatment, procedures, and medical decision making performed by me.

## 2022-08-17 NOTE — ED ADULT NURSE REASSESSMENT NOTE - NS ED NURSE REASSESS COMMENT FT1
Patient ambulated in hallway with staff assistance. Patient states she uses a walker at home provided by hospital. Patient denies, pain, lightheadedness, or dizziness. Patient states "I feel much better." Patient states she ate a little prior to walking.

## 2022-08-17 NOTE — ED PROVIDER NOTE - PHYSICAL EXAMINATION
Gen: NAD, AOx3, able to make needs known, non-toxic  Head: NCAT  HEENT: EOMI, oral mucosa moist, normal conjunctiva  Lung: CTAB, no respiratory distress, no wheezes/rhonchi/rales B/L, speaking in full sentences  CV: RRR, no murmurs  Abd: non distended, soft, nontender, no guarding, no CVA tenderness  MSK: no visible deformities  Neuro: Appears non focal  Skin: Warm, well perfused, no rash  Psych: normal affect Gen: NAD, AOx3, able to make needs known, non-toxic  Head: NCAT  HEENT: EOMI, oral mucosa moist, normal conjunctiva  Lung: CTAB, no respiratory distress, no wheezes/rhonchi/rales B/L, speaking in full sentences  CV: RRR, no murmurs  Abd: non distended, soft, nontender, no guarding, no CVA tenderness  MSK: no visible deformities. No midline spinal tenderness. Mild TTP of right knee with limited ROM.   Neuro: Appears non focal. CN 2-12 grossly intact. Strength 5/5 x4. No sensory deficits.   Skin: Warm, well perfused, no rash Right knee  with healing surgical incision. No significant drainage.   Psych: normal affect

## 2022-08-17 NOTE — ED CLERICAL - NS ED CLERK NOTE PRE-ARRIVAL INFORMATION; ADDITIONAL PRE-ARRIVAL INFORMATION
This patient is enrolled in the comprehensive joint replacement (CJR) program and has active care navigation.  This patient can be followed up by the care navigation team within 24 hours. To arrange close follow-up or to obtain additional clinical information about this patient, please call the contact number above. Please call the hospitalist for medical consultation ([phone]) PRIOR to admission or observation.  The hospitalist is part of the care team and can assist in acute medical management. Please call the orthopedic team () for ALL patients who require admission.

## 2022-08-17 NOTE — ED PROVIDER NOTE - PROGRESS NOTE DETAILS
Attending Nello: [facility] w/o acute pathology. pt ambulated steadily, no dizziness. Attending Angelicao: [facility] w/o acute pathology. pt ambulated steadily, no dizziness. Pt and  comfortable w/ DC. Return precautions provided, pt and  verbalized understanding. Ready for DC.

## 2022-08-17 NOTE — ED PROVIDER NOTE - NSFOLLOWUPINSTRUCTIONS_ED_ALL_ED_FT
You were evaluated after a fall. Your CT scan did now show any acute findings.    1) Follow up with your doctor this week    2) Return to the ED immediately for new or worsening symptoms     3) Please continue to take any home medications as prescribed    4) Your test results from your ED visit were discussed with you prior to discharge    5) You were provided with a copy of your test results

## 2022-08-17 NOTE — ED PROVIDER NOTE - OBJECTIVE STATEMENT
76 y/o female with a PMHx of anxiety, depression, Afib on Eliquis, HTN, hypothyroid, insomnia, lumbar disc disorder, mild dementia, osteoporosis, osteoarthritis, scoliosis, spinal stenosis, spondylolisthesis, ulcerative colitis presents to the ED s/p fall. Pt s/p right total knee replacement on 8/15 at [facility] with Dr. Tony and was d/c yesterday. Pt got up this morning for PT, felt dizzy and fell. Pt hit the side of her head. No LOC. No other complaints at this time. 78 y/o female with a PMHx of anxiety, depression, Afib on Eliquis, HTN, hypothyroid, insomnia, lumbar disc disorder, mild dementia, osteoporosis, osteoarthritis, scoliosis, spinal stenosis, spondylolisthesis, ulcerative colitis presents to the ED s/p fall. Pt s/p right total knee replacement on 8/15 at [facility] with Dr. Tony and was d/c yesterday. Pt got up this morning for PT, felt dizzy and fell. Pt hit the side of her head. No LOC. Dizziness has resolved. Pt took an Oxycodone a few minutes prior to fall. No other complaints at this time.

## 2022-08-17 NOTE — ED PROVIDER NOTE - PATIENT PORTAL LINK FT
You can access the FollowMyHealth Patient Portal offered by [facility] by registering at the following website: [email or web address]. By joining [employer]’s FollowMyHealth portal, you will also be able to view your health information using other applications (apps) compatible with our system.

## 2022-08-18 ENCOUNTER — NON-APPOINTMENT (OUTPATIENT)
Age: 77
End: 2022-08-18

## 2022-08-22 ENCOUNTER — EMERGENCY (EMERGENCY)
Facility: HOSPITAL | Age: 77
LOS: 0 days | Discharge: HOME | End: 2022-08-22
Attending: EMERGENCY MEDICINE
Payer: MEDICARE

## 2022-08-22 VITALS
DIASTOLIC BLOOD PRESSURE: 80 MMHG | OXYGEN SATURATION: 100 % | SYSTOLIC BLOOD PRESSURE: 110 MMHG | RESPIRATION RATE: 18 BRPM | HEART RATE: 82 BPM | TEMPERATURE: 98 F

## 2022-08-22 VITALS
DIASTOLIC BLOOD PRESSURE: 90 MMHG | TEMPERATURE: 98 F | RESPIRATION RATE: 18 BRPM | WEIGHT: 139.99 LBS | HEART RATE: 112 BPM | OXYGEN SATURATION: 100 % | HEIGHT: 61 IN | SYSTOLIC BLOOD PRESSURE: 131 MMHG

## 2022-08-22 DIAGNOSIS — Z98.890 OTHER SPECIFIED POSTPROCEDURAL STATES: Chronic | ICD-10-CM

## 2022-08-22 DIAGNOSIS — R53.1 WEAKNESS: ICD-10-CM

## 2022-08-22 DIAGNOSIS — Z88.5 ALLERGY STATUS TO NARCOTIC AGENT: ICD-10-CM

## 2022-08-22 DIAGNOSIS — Z90.49 ACQUIRED ABSENCE OF OTHER SPECIFIED PARTS OF DIGESTIVE TRACT: ICD-10-CM

## 2022-08-22 DIAGNOSIS — M81.0 AGE-RELATED OSTEOPOROSIS WITHOUT CURRENT PATHOLOGICAL FRACTURE: ICD-10-CM

## 2022-08-22 DIAGNOSIS — M17.11 UNILATERAL PRIMARY OSTEOARTHRITIS, RIGHT KNEE: ICD-10-CM

## 2022-08-22 DIAGNOSIS — Z98.1 ARTHRODESIS STATUS: ICD-10-CM

## 2022-08-22 DIAGNOSIS — E03.9 HYPOTHYROIDISM, UNSPECIFIED: ICD-10-CM

## 2022-08-22 DIAGNOSIS — R11.0 NAUSEA: ICD-10-CM

## 2022-08-22 DIAGNOSIS — Z90.49 ACQUIRED ABSENCE OF OTHER SPECIFIED PARTS OF DIGESTIVE TRACT: Chronic | ICD-10-CM

## 2022-08-22 DIAGNOSIS — Z98.890 OTHER SPECIFIED POSTPROCEDURAL STATES: ICD-10-CM

## 2022-08-22 DIAGNOSIS — Z96.611 PRESENCE OF RIGHT ARTIFICIAL SHOULDER JOINT: ICD-10-CM

## 2022-08-22 DIAGNOSIS — I10 ESSENTIAL (PRIMARY) HYPERTENSION: ICD-10-CM

## 2022-08-22 DIAGNOSIS — Z20.822 CONTACT WITH AND (SUSPECTED) EXPOSURE TO COVID-19: ICD-10-CM

## 2022-08-22 DIAGNOSIS — I48.91 UNSPECIFIED ATRIAL FIBRILLATION: ICD-10-CM

## 2022-08-22 DIAGNOSIS — Z79.01 LONG TERM (CURRENT) USE OF ANTICOAGULANTS: ICD-10-CM

## 2022-08-22 DIAGNOSIS — N39.0 URINARY TRACT INFECTION, SITE NOT SPECIFIED: ICD-10-CM

## 2022-08-22 DIAGNOSIS — F03.90 UNSPECIFIED DEMENTIA WITHOUT BEHAVIORAL DISTURBANCE: ICD-10-CM

## 2022-08-22 DIAGNOSIS — Z90.711 ACQUIRED ABSENCE OF UTERUS WITH REMAINING CERVICAL STUMP: Chronic | ICD-10-CM

## 2022-08-22 DIAGNOSIS — F41.8 OTHER SPECIFIED ANXIETY DISORDERS: ICD-10-CM

## 2022-08-22 DIAGNOSIS — K51.90 ULCERATIVE COLITIS, UNSPECIFIED, WITHOUT COMPLICATIONS: ICD-10-CM

## 2022-08-22 DIAGNOSIS — Z90.89 ACQUIRED ABSENCE OF OTHER ORGANS: ICD-10-CM

## 2022-08-22 DIAGNOSIS — Z90.710 ACQUIRED ABSENCE OF BOTH CERVIX AND UTERUS: ICD-10-CM

## 2022-08-22 DIAGNOSIS — R19.7 DIARRHEA, UNSPECIFIED: ICD-10-CM

## 2022-08-22 DIAGNOSIS — Z98.1 ARTHRODESIS STATUS: Chronic | ICD-10-CM

## 2022-08-22 DIAGNOSIS — M41.9 SCOLIOSIS, UNSPECIFIED: ICD-10-CM

## 2022-08-22 DIAGNOSIS — Z90.89 ACQUIRED ABSENCE OF OTHER ORGANS: Chronic | ICD-10-CM

## 2022-08-22 LAB
ADD ON TEST-SPECIMEN IN LAB: SIGNIFICANT CHANGE UP
ALBUMIN SERPL ELPH-MCNC: 3.2 G/DL — LOW (ref 3.3–5)
ALP SERPL-CCNC: 86 U/L — SIGNIFICANT CHANGE UP (ref 40–120)
ALT FLD-CCNC: 30 U/L — SIGNIFICANT CHANGE UP (ref 12–78)
ANION GAP SERPL CALC-SCNC: 9 MMOL/L — SIGNIFICANT CHANGE UP (ref 5–17)
APPEARANCE UR: CLEAR — SIGNIFICANT CHANGE UP
AST SERPL-CCNC: 41 U/L — HIGH (ref 15–37)
BASOPHILS # BLD AUTO: 0 K/UL — SIGNIFICANT CHANGE UP (ref 0–0.2)
BASOPHILS NFR BLD AUTO: 0 % — SIGNIFICANT CHANGE UP (ref 0–2)
BILIRUB SERPL-MCNC: 1.6 MG/DL — HIGH (ref 0.2–1.2)
BILIRUB UR-MCNC: ABNORMAL
BUN SERPL-MCNC: 17 MG/DL — SIGNIFICANT CHANGE UP (ref 7–23)
CALCIUM SERPL-MCNC: 10.3 MG/DL — HIGH (ref 8.5–10.1)
CHLORIDE SERPL-SCNC: 100 MMOL/L — SIGNIFICANT CHANGE UP (ref 96–108)
CO2 SERPL-SCNC: 26 MMOL/L — SIGNIFICANT CHANGE UP (ref 22–31)
COLOR SPEC: ABNORMAL
CREAT SERPL-MCNC: 1.24 MG/DL — SIGNIFICANT CHANGE UP (ref 0.5–1.3)
DIFF PNL FLD: ABNORMAL
EGFR: 44 ML/MIN/1.73M2 — LOW
EOSINOPHIL # BLD AUTO: 0.09 K/UL — SIGNIFICANT CHANGE UP (ref 0–0.5)
EOSINOPHIL NFR BLD AUTO: 1 % — SIGNIFICANT CHANGE UP (ref 0–6)
GLUCOSE SERPL-MCNC: 131 MG/DL — HIGH (ref 70–99)
GLUCOSE UR QL: NEGATIVE — SIGNIFICANT CHANGE UP
HCT VFR BLD CALC: 39.6 % — SIGNIFICANT CHANGE UP (ref 34.5–45)
HGB BLD-MCNC: 13 G/DL — SIGNIFICANT CHANGE UP (ref 11.5–15.5)
KETONES UR-MCNC: ABNORMAL
LEUKOCYTE ESTERASE UR-ACNC: ABNORMAL
LYMPHOCYTES # BLD AUTO: 0.19 K/UL — LOW (ref 1–3.3)
LYMPHOCYTES # BLD AUTO: 2 % — LOW (ref 13–44)
MCHC RBC-ENTMCNC: 30 PG — SIGNIFICANT CHANGE UP (ref 27–34)
MCHC RBC-ENTMCNC: 32.8 GM/DL — SIGNIFICANT CHANGE UP (ref 32–36)
MCV RBC AUTO: 91.5 FL — SIGNIFICANT CHANGE UP (ref 80–100)
MONOCYTES # BLD AUTO: 0.94 K/UL — HIGH (ref 0–0.9)
MONOCYTES NFR BLD AUTO: 10 % — SIGNIFICANT CHANGE UP (ref 2–14)
NEUTROPHILS # BLD AUTO: 7.67 K/UL — HIGH (ref 1.8–7.4)
NEUTROPHILS NFR BLD AUTO: 82 % — HIGH (ref 43–77)
NITRITE UR-MCNC: POSITIVE
NRBC # BLD: SIGNIFICANT CHANGE UP /100 WBCS (ref 0–0)
PH UR: 7 — SIGNIFICANT CHANGE UP (ref 5–8)
PLATELET # BLD AUTO: 340 K/UL — SIGNIFICANT CHANGE UP (ref 150–400)
POTASSIUM SERPL-MCNC: 3.5 MMOL/L — SIGNIFICANT CHANGE UP (ref 3.5–5.3)
POTASSIUM SERPL-SCNC: 3.5 MMOL/L — SIGNIFICANT CHANGE UP (ref 3.5–5.3)
PROT SERPL-MCNC: 6.9 GM/DL — SIGNIFICANT CHANGE UP (ref 6–8.3)
PROT UR-MCNC: 15
RBC # BLD: 4.33 M/UL — SIGNIFICANT CHANGE UP (ref 3.8–5.2)
RBC # FLD: 15.5 % — HIGH (ref 10.3–14.5)
SODIUM SERPL-SCNC: 135 MMOL/L — SIGNIFICANT CHANGE UP (ref 135–145)
SP GR SPEC: 1.01 — SIGNIFICANT CHANGE UP (ref 1.01–1.02)
UROBILINOGEN FLD QL: 1
WBC # BLD: 9.35 K/UL — SIGNIFICANT CHANGE UP (ref 3.8–10.5)
WBC # FLD AUTO: 9.35 K/UL — SIGNIFICANT CHANGE UP (ref 3.8–10.5)

## 2022-08-22 PROCEDURE — 71045 X-RAY EXAM CHEST 1 VIEW: CPT

## 2022-08-22 PROCEDURE — 85025 COMPLETE CBC W/AUTO DIFF WBC: CPT

## 2022-08-22 PROCEDURE — U0005: CPT

## 2022-08-22 PROCEDURE — 96374 THER/PROPH/DIAG INJ IV PUSH: CPT

## 2022-08-22 PROCEDURE — 87086 URINE CULTURE/COLONY COUNT: CPT

## 2022-08-22 PROCEDURE — 80053 COMPREHEN METABOLIC PANEL: CPT

## 2022-08-22 PROCEDURE — 84484 ASSAY OF TROPONIN QUANT: CPT

## 2022-08-22 PROCEDURE — 93010 ELECTROCARDIOGRAM REPORT: CPT

## 2022-08-22 PROCEDURE — 36415 COLL VENOUS BLD VENIPUNCTURE: CPT

## 2022-08-22 PROCEDURE — U0003: CPT

## 2022-08-22 PROCEDURE — 81001 URINALYSIS AUTO W/SCOPE: CPT

## 2022-08-22 PROCEDURE — 99284 EMERGENCY DEPT VISIT MOD MDM: CPT

## 2022-08-22 PROCEDURE — 71045 X-RAY EXAM CHEST 1 VIEW: CPT | Mod: 26

## 2022-08-22 PROCEDURE — 99285 EMERGENCY DEPT VISIT HI MDM: CPT | Mod: 25

## 2022-08-22 PROCEDURE — 93005 ELECTROCARDIOGRAM TRACING: CPT

## 2022-08-22 PROCEDURE — 96361 HYDRATE IV INFUSION ADD-ON: CPT

## 2022-08-22 RX ORDER — SODIUM CHLORIDE 9 MG/ML
1000 INJECTION INTRAMUSCULAR; INTRAVENOUS; SUBCUTANEOUS ONCE
Refills: 0 | Status: COMPLETED | OUTPATIENT
Start: 2022-08-22 | End: 2022-08-22

## 2022-08-22 RX ORDER — DRONEDARONE 400 MG/1
200 TABLET, FILM COATED ORAL
Qty: 0 | Refills: 0 | DISCHARGE

## 2022-08-22 RX ORDER — ONDANSETRON 8 MG/1
4 TABLET, FILM COATED ORAL ONCE
Refills: 0 | Status: COMPLETED | OUTPATIENT
Start: 2022-08-22 | End: 2022-08-22

## 2022-08-22 RX ORDER — DULOXETINE HYDROCHLORIDE 30 MG/1
1 CAPSULE, DELAYED RELEASE ORAL
Qty: 0 | Refills: 0 | DISCHARGE

## 2022-08-22 RX ORDER — ONDANSETRON 8 MG/1
1 TABLET, FILM COATED ORAL
Qty: 20 | Refills: 0
Start: 2022-08-22 | End: 2022-08-26

## 2022-08-22 RX ADMIN — ONDANSETRON 4 MILLIGRAM(S): 8 TABLET, FILM COATED ORAL at 14:55

## 2022-08-22 RX ADMIN — SODIUM CHLORIDE 1000 MILLILITER(S): 9 INJECTION INTRAMUSCULAR; INTRAVENOUS; SUBCUTANEOUS at 14:25

## 2022-08-22 NOTE — ED PROVIDER NOTE - NSFOLLOWUPINSTRUCTIONS_ED_ALL_ED_FT
Return to the Emergency Department for worsening or persistent symptoms, and/or ANY NEW OR CONCERNING SYMPTOMS. If you have issues obtaining follow up, please call: [phone] or [phone]  to obtain a doctor or specialist who takes your insurance in your area.      Urinary Tract Infection in Women    WHAT YOU NEED TO KNOW:    A urinary tract infection (UTI) is caused by bacteria that get inside your urinary tract. Your urinary tract includes your kidneys, ureters, bladder, and urethra. A UTI is more common in your lower urinary tract, which includes your bladder and urethra.  Female Urinary System         DISCHARGE INSTRUCTIONS:    Return to the emergency department if:   •You are urinating very little or not at all.      •You have a high fever with shaking chills.      •You have side or back pain that gets worse.      Call your doctor if:   •You have a fever.      •You do not feel better after 2 days of taking antibiotics.      •You have new symptoms, such as blood or pus in your urine.      •You are vomiting.      •You have questions or concerns about your condition or care.      Medicines:   •Antibiotics treat a bacterial infection. If you have UTIs often (called recurrent UTIs), you may be given antibiotics to take regularly. You will be given directions for when and how to use antibiotics. The goal is to prevent UTIs but not cause antibiotic resistance by using antibiotics too often.      •Medicines may be given to decrease pain and burning when you urinate. They will also help decrease the feeling that you need to urinate often. These medicines may make your urine orange or red.      •Take your medicine as directed. Contact your healthcare provider if you think your medicine is not helping or if you have side effects. Tell him or her if you are allergic to any medicine. Keep a list of the medicines, vitamins, and herbs you take. Include the amounts, and when and why you take them. Bring the list or the pill bottles to follow-up visits. Carry your medicine list with you in case of an emergency.      Follow up with your doctor as directed: Write down your questions so you remember to ask them during your visits.    Prevent another UTI:   •Empty your bladder often. Urinate and empty your bladder as soon as you feel the need. Do not hold your urine for long periods of time.      •Wipe from front to back after you urinate or have a bowel movement. This will help prevent germs from getting into your urinary tract through your urethra.      •Drink liquids as directed. Ask how much liquid to drink each day and which liquids are best for you. You may need to drink more liquids than usual to help flush out the bacteria. Do not drink alcohol, caffeine, or citrus juices. These can irritate your bladder and increase your symptoms. Your healthcare provider may recommend cranberry juice to help prevent a UTI.      •Urinate before and after you have sex. This can help flush out bacteria passed during sex.      •Do not douche or use feminine deodorants. These can change the chemical balance in your vagina.      •Change sanitary pads or tampons often. This will help prevent germs from getting into your urinary tract.      •Talk to your healthcare provider about your birth control method. You may need to change your method if it is increasing your risk for UTIs.      •Wear cotton underwear and clothes that are loose. Tight pants and nylon underwear can trap moisture and cause bacteria to grow.      •Vaginal estrogen may be recommended. This medicine helps prevent UTIs in women who have gone through menopause or are in emmanuel-menopause.      •Do pelvic muscle exercises often. Pelvic muscle exercises may help you start and stop urinating. Strong pelvic muscles may help you empty your bladder easier. Squeeze these muscles tightly for 5 seconds like you are trying to hold back urine. Then relax for 5 seconds. Gradually work up to squeezing for 10 seconds. Do 3 sets of 15 repetitions a day, or as directed.

## 2022-08-22 NOTE — ED ADULT NURSE REASSESSMENT NOTE - NS ED NURSE REASSESS COMMENT FT1
Pt is tolerating PO intake at this time. Pt denies any nausea at this time. Pt was able to ambulate the bathroom without any complications.

## 2022-08-22 NOTE — ED ADULT NURSE NOTE - OBJECTIVE STATEMENT
Pt presented to the ER with c/o generalized malaise and nausea. Pt stated that she had a total right knee replacement last Monday. Pt stated that she started to have a decrease PO intake due to being nauseous which is causing her to feel weak. Pt right lower extremity noted to be discolored. Wound dressing noted to be on right knee. Pt denies taking any pain medication at this time. Pt denies any SOB, CP, or dizziness at this time.

## 2022-08-22 NOTE — ED PROVIDER NOTE - PATIENT PORTAL LINK FT
You can access the FollowMyHealth Patient Portal offered by [facility] by registering at the following website: [email or web address]. By joining [employer]’s FollowMyHealth portal, you will also be able to view your health information using other applications (apps) compatible with our system.

## 2022-08-22 NOTE — ED PROVIDER NOTE - OTHER FINDINGS
Afib at 95 bpm. Normal axis, normal intervals, t-wave inversion anterior and lateral lead no acute st elevation.

## 2022-08-22 NOTE — ED ADULT TRIAGE NOTE - CHIEF COMPLAINT QUOTE
pt presents to ed via ems from home for evaluation of general malaise, N/V, poor po intake  reports right knee sx last week

## 2022-08-22 NOTE — ED PROVIDER NOTE - PROGRESS NOTE DETAILS
Labs grossly unchanged from previous.  Urinalysis concerning for UTI.  Patient nodule for the last 3 days at the direction of her GI doctor.  Does report that she started to feel some improvement since taking this.  Urine culture was also sent.  Patient denies any urinary symptoms.  After Zofran, her nausea has resolved and she is tolerating meal, feeling better.  Still feels generalized weakness but has otherwise hemodynamically stable, feels that she is able to ambulate at her baseline with walker.  Advised to continue the Cipro and Flagyl to cover GI as well as Cipro UTI until culture results.  Will prescribe Zofran as needed so that she can continue to hydrate orally.  Strict ED return precautions discussed

## 2022-08-22 NOTE — ED PROVIDER NOTE - OBJECTIVE STATEMENT
78 y/o female with a PMHx of Afib on Eliquis, osteoporosis, OA right knee, HTN, hypothyroid, ulcerative colitis presents to the ED c/o weakness. 78 y/o female with a PMHx of Afib on Eliquis, osteoporosis, OA right knee, HTN, hypothyroid, ulcerative colitis, knee surgery at [facility] presents to the ED c/o worsening weakness and nausea, diarrhea s/p set back from ulcerative colitis, no fever no vomiting no CP, no blood in stool, no abd pain.

## 2022-08-22 NOTE — ED PROVIDER NOTE - NS ED MD DISPO DISCHARGE CCDA
"                                  [id number] BIJAL GARCIAFERMeter ID: [id number]       POC Glucose Once [[id number]]  (Abnormal) Collected: 11/04/20 1929    Specimen: Blood Updated: 11/04/20 2129     Glucose 250 mg/dL      Comment: RN NotifiedOperator: [id number] ANETA BLOUNTMeter ID: [id number]       POC Glucose Once [[id number]]  (Abnormal) Collected: 11/04/20 1012    Specimen: Blood Updated: 11/04/20 1043     Glucose 139 mg/dL      Comment: RN NotifiedOperator: [id number] BIJAL GARCIAFERMeter ID: [id number]       Basic Metabolic Panel [[id number]]  ([id number] KAITLYN GARCIAFERMeter ID: [id number]       CBC & Differential [[id number]]  (CST  Dictated using Dragon.     Patient/Caregiver provided printed discharge information.

## 2022-08-22 NOTE — ED PROVIDER NOTE - PHYSICAL EXAMINATION
Constitutional: NAD AAOx3  Eyes: PERRLA EOMI  Head: Normocephalic atraumatic  Mouth: MMM  Cardiac: regular rate   Resp: Lungs CTAB  GI: Abd s/nt/nd, no rebound or guarding.  Neuro: awake, alert, moving all extremities, cranial nerves 2-12 intact, sensation intact, no dysmetria.  Skin: No rashes Constitutional: NAD AAOx3  Eyes: PERRLA EOMI  Head: Normocephalic atraumatic  Mouth: MMM  Cardiac: regular rate   Resp: Lungs CTAB  GI: Abd s/nt/nd, no rebound or guarding.  MSK: R knee surg scars nvi full rom no le edema.  Neuro: awake, alert, moving all extremities, cranial nerves 2-12 intact, sensation intact, no dysmetria.  Skin: No rashes

## 2022-08-22 NOTE — ED ADULT NURSE NOTE - TEMPLATE
1. Persistent atrial fibrillation with labile ventricular rates that have been faster since he had COVID infection in November 2021. Recent Holter monitor reveals average heart rates of about 105 beats per minute on high dose metoprolol succinate at 100 b.i.d. and diltiazem  mg once a day. Preserved EF on last echocardiogram in December 2020. Increase Cardizem to 240 mg p.o. daily. If rate control continues to be an issue, may need to consider AV xavi ablation and pacemaker in the future. 2. Status post left atrial appendage ligation in December 2010. Has had issues with epistaxis and hematuria. Therefore not on anticoagulation. 3. Status post TAVR in December 2019  4. Mild nonobstructive coronary artery disease on cardiac catheterization in December 2019.   5. Not vaccinated for COVID-19. Patient still undecided. Â   Follow-up in 6 weeks to assess rate control.   Â  Abdominal Pain, N/V/D

## 2022-08-22 NOTE — ED PROVIDER NOTE - NSICDXFAMILYHX_GEN_ALL_CORE_FT
FAMILY HISTORY:  Mother  Still living? No  Family history of osteoarthritis, Age at diagnosis: Age Unknown    
3-5x/week

## 2022-08-22 NOTE — ED ADULT NURSE NOTE - NSIMPLEMENTINTERV_GEN_ALL_ED
Implemented All Fall with Harm Risk Interventions:  [place] to call system. Call bell, personal items and telephone within reach. Instruct patient to call for assistance. Room bathroom lighting operational. Non-slip footwear when patient is off stretcher. Physically safe environment: no spills, clutter or unnecessary equipment. Stretcher in lowest position, wheels locked, appropriate side rails in place. Provide visual cue, wrist band, yellow gown, etc. Monitor gait and stability. Monitor for mental status changes and reorient to person, place, and time. Review medications for side effects contributing to fall risk. Reinforce activity limits and safety measures with patient and family. Provide visual clues: red socks.

## 2022-08-22 NOTE — ED PROVIDER NOTE - NS ED ROS FT
Constitutional: NAD AAOx3  Eyes: PERRLA EOMI  Head: Normocephalic atraumatic  Mouth: MMM  Cardiac: regular rate   Resp: Lungs CTAB  GI: Abd s/nt/nd, no rebound or guarding.  Neuro: awake, alert, moving all extremities, cranial nerves 2-12 intact, sensation intact, no dysmetria.  Skin: No rashes Constitutional: No fever or chills, +weakness   Eyes: No visual changes  HEENT: No throat pain  CV: No chest pain  Resp: No SOB no cough  GI: No abd pain, no vomiting. +nausea, +diarrhea  : No dysuria  MSK: No musculoskeletal pain  Skin: No rash  Neuro: No headache

## 2022-08-22 NOTE — ED CLERICAL - NS ED CLERK NOTE PRE-ARRIVAL INFORMATION; ADDITIONAL PRE-ARRIVAL INFORMATION
This patient is enrolled in the comprehensive joint replacement (CJR) program and has active care navigation. This patient can be followed up by the care navigation team within 24 hours. To arrange close follow-up or to obtain additional clinical information about this patient, please call the contact number above. Please call the hospitalist for medical consultation ([phone]) PRIOR to admission or observation.  The hospitalist is part of the care team and can assist in acute medical management. Please call the orthopedic team () for ALL patients who require admission.

## 2022-08-23 LAB
CULTURE RESULTS: SIGNIFICANT CHANGE UP
SPECIMEN SOURCE: SIGNIFICANT CHANGE UP

## 2022-08-24 RX ORDER — METRONIDAZOLE 500 MG
1 TABLET ORAL
Qty: 21 | Refills: 0
Start: 2022-08-24 | End: 2022-08-30

## 2022-08-24 RX ORDER — CIPROFLOXACIN LACTATE 400MG/40ML
1 VIAL (ML) INTRAVENOUS
Qty: 14 | Refills: 0
Start: 2022-08-24 | End: 2022-08-30

## 2022-08-26 ENCOUNTER — APPOINTMENT (OUTPATIENT)
Dept: GASTROENTEROLOGY | Facility: CLINIC | Age: 77
End: 2022-08-26

## 2022-08-29 ENCOUNTER — NON-APPOINTMENT (OUTPATIENT)
Age: 77
End: 2022-08-29

## 2022-08-29 ENCOUNTER — APPOINTMENT (OUTPATIENT)
Dept: ORTHOPEDIC SURGERY | Facility: CLINIC | Age: 77
End: 2022-08-29

## 2022-08-29 PROCEDURE — 73562 X-RAY EXAM OF KNEE 3: CPT | Mod: RT

## 2022-08-29 PROCEDURE — 99024 POSTOP FOLLOW-UP VISIT: CPT

## 2022-08-29 RX ORDER — AMOXICILLIN 500 MG/1
500 CAPSULE ORAL
Qty: 20 | Refills: 4 | Status: ACTIVE | COMMUNITY
Start: 2022-08-29 | End: 1900-01-01

## 2022-09-15 ENCOUNTER — INPATIENT (INPATIENT)
Facility: HOSPITAL | Age: 77
LOS: 4 days | Discharge: HOME | DRG: 389 | End: 2022-09-20
Attending: SURGERY | Admitting: SURGERY
Payer: MEDICARE

## 2022-09-15 VITALS
OXYGEN SATURATION: 97 % | HEIGHT: 61 IN | SYSTOLIC BLOOD PRESSURE: 136 MMHG | HEART RATE: 75 BPM | RESPIRATION RATE: 16 BRPM | TEMPERATURE: 98 F | DIASTOLIC BLOOD PRESSURE: 104 MMHG | WEIGHT: 138.01 LBS

## 2022-09-15 DIAGNOSIS — Z90.89 ACQUIRED ABSENCE OF OTHER ORGANS: Chronic | ICD-10-CM

## 2022-09-15 DIAGNOSIS — Z98.890 OTHER SPECIFIED POSTPROCEDURAL STATES: Chronic | ICD-10-CM

## 2022-09-15 DIAGNOSIS — Z90.711 ACQUIRED ABSENCE OF UTERUS WITH REMAINING CERVICAL STUMP: Chronic | ICD-10-CM

## 2022-09-15 DIAGNOSIS — Z98.1 ARTHRODESIS STATUS: Chronic | ICD-10-CM

## 2022-09-15 DIAGNOSIS — Z90.49 ACQUIRED ABSENCE OF OTHER SPECIFIED PARTS OF DIGESTIVE TRACT: Chronic | ICD-10-CM

## 2022-09-15 DIAGNOSIS — K56.609 UNSPECIFIED INTESTINAL OBSTRUCTION, UNSPECIFIED AS TO PARTIAL VERSUS COMPLETE OBSTRUCTION: ICD-10-CM

## 2022-09-15 LAB
APTT BLD: 39.5 SEC — HIGH (ref 27.5–35.5)
BASOPHILS # BLD AUTO: 0.04 K/UL — SIGNIFICANT CHANGE UP (ref 0–0.2)
BASOPHILS NFR BLD AUTO: 0.6 % — SIGNIFICANT CHANGE UP (ref 0–2)
EOSINOPHIL # BLD AUTO: 0 K/UL — SIGNIFICANT CHANGE UP (ref 0–0.5)
EOSINOPHIL NFR BLD AUTO: 0 % — SIGNIFICANT CHANGE UP (ref 0–6)
HCT VFR BLD CALC: 47.5 % — HIGH (ref 34.5–45)
HGB BLD-MCNC: 15.6 G/DL — HIGH (ref 11.5–15.5)
IMM GRANULOCYTES NFR BLD AUTO: 0.3 % — SIGNIFICANT CHANGE UP (ref 0–0.9)
INR BLD: 2 RATIO — HIGH (ref 0.88–1.16)
LACTATE SERPL-SCNC: 1.2 MMOL/L — SIGNIFICANT CHANGE UP (ref 0.7–2)
LYMPHOCYTES # BLD AUTO: 0.21 K/UL — LOW (ref 1–3.3)
LYMPHOCYTES # BLD AUTO: 3.2 % — LOW (ref 13–44)
MCHC RBC-ENTMCNC: 30.1 PG — SIGNIFICANT CHANGE UP (ref 27–34)
MCHC RBC-ENTMCNC: 32.8 GM/DL — SIGNIFICANT CHANGE UP (ref 32–36)
MCV RBC AUTO: 91.5 FL — SIGNIFICANT CHANGE UP (ref 80–100)
MONOCYTES # BLD AUTO: 0.86 K/UL — SIGNIFICANT CHANGE UP (ref 0–0.9)
MONOCYTES NFR BLD AUTO: 13.3 % — SIGNIFICANT CHANGE UP (ref 2–14)
NEUTROPHILS # BLD AUTO: 5.34 K/UL — SIGNIFICANT CHANGE UP (ref 1.8–7.4)
NEUTROPHILS NFR BLD AUTO: 82.6 % — HIGH (ref 43–77)
PLATELET # BLD AUTO: 359 K/UL — SIGNIFICANT CHANGE UP (ref 150–400)
PROTHROM AB SERPL-ACNC: 23.4 SEC — HIGH (ref 10.5–13.4)
RBC # BLD: 5.19 M/UL — SIGNIFICANT CHANGE UP (ref 3.8–5.2)
RBC # FLD: 15.3 % — HIGH (ref 10.3–14.5)
WBC # BLD: 6.47 K/UL — SIGNIFICANT CHANGE UP (ref 3.8–10.5)
WBC # FLD AUTO: 6.47 K/UL — SIGNIFICANT CHANGE UP (ref 3.8–10.5)

## 2022-09-15 PROCEDURE — 71045 X-RAY EXAM CHEST 1 VIEW: CPT

## 2022-09-15 PROCEDURE — 83735 ASSAY OF MAGNESIUM: CPT

## 2022-09-15 PROCEDURE — 85730 THROMBOPLASTIN TIME PARTIAL: CPT

## 2022-09-15 PROCEDURE — 80053 COMPREHEN METABOLIC PANEL: CPT

## 2022-09-15 PROCEDURE — 81001 URINALYSIS AUTO W/SCOPE: CPT

## 2022-09-15 PROCEDURE — 86900 BLOOD TYPING SEROLOGIC ABO: CPT

## 2022-09-15 PROCEDURE — 74019 RADEX ABDOMEN 2 VIEWS: CPT

## 2022-09-15 PROCEDURE — 84100 ASSAY OF PHOSPHORUS: CPT

## 2022-09-15 PROCEDURE — 93010 ELECTROCARDIOGRAM REPORT: CPT

## 2022-09-15 PROCEDURE — 86850 RBC ANTIBODY SCREEN: CPT

## 2022-09-15 PROCEDURE — 97163 PT EVAL HIGH COMPLEX 45 MIN: CPT | Mod: GP

## 2022-09-15 PROCEDURE — 86901 BLOOD TYPING SEROLOGIC RH(D): CPT

## 2022-09-15 PROCEDURE — 36415 COLL VENOUS BLD VENIPUNCTURE: CPT

## 2022-09-15 PROCEDURE — 83615 LACTATE (LD) (LDH) ENZYME: CPT

## 2022-09-15 PROCEDURE — 99285 EMERGENCY DEPT VISIT HI MDM: CPT | Mod: FS

## 2022-09-15 PROCEDURE — 80048 BASIC METABOLIC PNL TOTAL CA: CPT

## 2022-09-15 PROCEDURE — 74177 CT ABD & PELVIS W/CONTRAST: CPT | Mod: 26,MA

## 2022-09-15 PROCEDURE — 93005 ELECTROCARDIOGRAM TRACING: CPT

## 2022-09-15 PROCEDURE — 74250 X-RAY XM SM INT 1CNTRST STD: CPT

## 2022-09-15 PROCEDURE — 85027 COMPLETE CBC AUTOMATED: CPT

## 2022-09-15 PROCEDURE — C9113: CPT

## 2022-09-15 PROCEDURE — 74021 RADEX ABDOMEN 3+ VIEWS: CPT

## 2022-09-15 PROCEDURE — 83605 ASSAY OF LACTIC ACID: CPT

## 2022-09-15 PROCEDURE — 85610 PROTHROMBIN TIME: CPT

## 2022-09-15 PROCEDURE — 97116 GAIT TRAINING THERAPY: CPT | Mod: GP

## 2022-09-15 PROCEDURE — 71045 X-RAY EXAM CHEST 1 VIEW: CPT | Mod: 26

## 2022-09-15 RX ORDER — MESALAMINE 400 MG
1 TABLET, DELAYED RELEASE (ENTERIC COATED) ORAL
Qty: 0 | Refills: 0 | DISCHARGE

## 2022-09-15 RX ORDER — RISEDRONATE SODIUM 25.8; 4.2 MG/1; MG/1
1 TABLET, FILM COATED ORAL
Qty: 0 | Refills: 0 | DISCHARGE

## 2022-09-15 RX ORDER — ACETAMINOPHEN 500 MG
1000 TABLET ORAL ONCE
Refills: 0 | Status: DISCONTINUED | OUTPATIENT
Start: 2022-09-15 | End: 2022-09-20

## 2022-09-15 RX ORDER — LANOLIN ALCOHOL/MO/W.PET/CERES
5 CREAM (GRAM) TOPICAL AT BEDTIME
Refills: 0 | Status: DISCONTINUED | OUTPATIENT
Start: 2022-09-15 | End: 2022-09-20

## 2022-09-15 RX ORDER — MORPHINE SULFATE 50 MG/1
2 CAPSULE, EXTENDED RELEASE ORAL EVERY 4 HOURS
Refills: 0 | Status: DISCONTINUED | OUTPATIENT
Start: 2022-09-15 | End: 2022-09-17

## 2022-09-15 RX ORDER — ATORVASTATIN CALCIUM 80 MG/1
40 TABLET, FILM COATED ORAL AT BEDTIME
Refills: 0 | Status: DISCONTINUED | OUTPATIENT
Start: 2022-09-15 | End: 2022-09-20

## 2022-09-15 RX ORDER — LEVOTHYROXINE SODIUM 125 MCG
25 TABLET ORAL DAILY
Refills: 0 | Status: DISCONTINUED | OUTPATIENT
Start: 2022-09-15 | End: 2022-09-15

## 2022-09-15 RX ORDER — PHYTONADIONE (VIT K1) 5 MG
5 TABLET ORAL ONCE
Refills: 0 | Status: COMPLETED | OUTPATIENT
Start: 2022-09-15 | End: 2022-09-15

## 2022-09-15 RX ORDER — SODIUM CHLORIDE 9 MG/ML
1000 INJECTION INTRAMUSCULAR; INTRAVENOUS; SUBCUTANEOUS ONCE
Refills: 0 | Status: COMPLETED | OUTPATIENT
Start: 2022-09-15 | End: 2022-09-15

## 2022-09-15 RX ORDER — METOPROLOL TARTRATE 50 MG
5 TABLET ORAL EVERY 6 HOURS
Refills: 0 | Status: DISCONTINUED | OUTPATIENT
Start: 2022-09-15 | End: 2022-09-15

## 2022-09-15 RX ORDER — LEVOTHYROXINE SODIUM 125 MCG
18.75 TABLET ORAL ONCE
Refills: 0 | Status: COMPLETED | OUTPATIENT
Start: 2022-09-15 | End: 2022-09-16

## 2022-09-15 RX ORDER — MEMANTINE HYDROCHLORIDE 10 MG/1
10 TABLET ORAL
Refills: 0 | Status: DISCONTINUED | OUTPATIENT
Start: 2022-09-15 | End: 2022-09-20

## 2022-09-15 RX ORDER — METOPROLOL TARTRATE 50 MG
5 TABLET ORAL EVERY 6 HOURS
Refills: 0 | Status: DISCONTINUED | OUTPATIENT
Start: 2022-09-15 | End: 2022-09-19

## 2022-09-15 RX ORDER — ACETAMINOPHEN 500 MG
1000 TABLET ORAL ONCE
Refills: 0 | Status: COMPLETED | OUTPATIENT
Start: 2022-09-15 | End: 2022-09-15

## 2022-09-15 RX ORDER — SODIUM CHLORIDE 9 MG/ML
1000 INJECTION INTRAMUSCULAR; INTRAVENOUS; SUBCUTANEOUS
Refills: 0 | Status: DISCONTINUED | OUTPATIENT
Start: 2022-09-15 | End: 2022-09-16

## 2022-09-15 RX ORDER — METOPROLOL TARTRATE 50 MG
50 TABLET ORAL DAILY
Refills: 0 | Status: DISCONTINUED | OUTPATIENT
Start: 2022-09-15 | End: 2022-09-15

## 2022-09-15 RX ORDER — DULOXETINE HYDROCHLORIDE 30 MG/1
1 CAPSULE, DELAYED RELEASE ORAL
Qty: 0 | Refills: 0 | DISCHARGE

## 2022-09-15 RX ORDER — PANTOPRAZOLE SODIUM 20 MG/1
40 TABLET, DELAYED RELEASE ORAL DAILY
Refills: 0 | Status: DISCONTINUED | OUTPATIENT
Start: 2022-09-15 | End: 2022-09-20

## 2022-09-15 RX ADMIN — ATORVASTATIN CALCIUM 40 MILLIGRAM(S): 80 TABLET, FILM COATED ORAL at 21:32

## 2022-09-15 RX ADMIN — Medication 5 MILLIGRAM(S): at 21:32

## 2022-09-15 RX ADMIN — SODIUM CHLORIDE 1000 MILLILITER(S): 9 INJECTION INTRAMUSCULAR; INTRAVENOUS; SUBCUTANEOUS at 12:14

## 2022-09-15 RX ADMIN — Medication 1000 MILLIGRAM(S): at 22:03

## 2022-09-15 RX ADMIN — Medication 101 MILLIGRAM(S): at 17:18

## 2022-09-15 RX ADMIN — MORPHINE SULFATE 2 MILLIGRAM(S): 50 CAPSULE, EXTENDED RELEASE ORAL at 16:39

## 2022-09-15 RX ADMIN — PANTOPRAZOLE SODIUM 40 MILLIGRAM(S): 20 TABLET, DELAYED RELEASE ORAL at 16:39

## 2022-09-15 RX ADMIN — SODIUM CHLORIDE 1000 MILLILITER(S): 9 INJECTION INTRAMUSCULAR; INTRAVENOUS; SUBCUTANEOUS at 16:39

## 2022-09-15 RX ADMIN — MEMANTINE HYDROCHLORIDE 10 MILLIGRAM(S): 10 TABLET ORAL at 21:32

## 2022-09-15 RX ADMIN — SODIUM CHLORIDE 1000 MILLILITER(S): 9 INJECTION INTRAMUSCULAR; INTRAVENOUS; SUBCUTANEOUS at 14:35

## 2022-09-15 RX ADMIN — SODIUM CHLORIDE 1000 MILLILITER(S): 9 INJECTION INTRAMUSCULAR; INTRAVENOUS; SUBCUTANEOUS at 11:14

## 2022-09-15 RX ADMIN — Medication 5 MILLIGRAM(S): at 17:25

## 2022-09-15 RX ADMIN — Medication 400 MILLIGRAM(S): at 21:33

## 2022-09-15 NOTE — ED STATDOCS - CLINICAL SUMMARY MEDICAL DECISION MAKING FREE TEXT BOX
ddx colitis/diverticulitis no abdominal tenderness or guarding to suggest surgical abdomen.   Plan cbc cmp lipase ct abd pelvis pain control, nausea, medications fluids and reassess. ddx colitis/diverticulitis no abdominal tenderness or guarding to suggest surgical abdomen.   Plan cbc cmp lipase ct abd pelvis pain control, nausea, medications fluids and reassess.    PA: Patient presented with nausea, poor appetite, abd pain. CT +jacquelyn for SBO. Admitted to dr. Crowley. ~Guillermo Cruz PA-C

## 2022-09-15 NOTE — ED STATDOCS - OBJECTIVE STATEMENT
76 y/o female with a PMHx of Afib, ulcerative colitis, HTN presents to the ED c/o worsening lower abd pain starting 4 days ago from Cipro and Flagyl making her nauseous, decreased appetite, diarrhea, no vomiting, no blood in stool.

## 2022-09-15 NOTE — ED STATDOCS - PROGRESS NOTE DETAILS
Spoke with surgical resident Dr. Marquez, wants to repeat CT abd/pel with PO contrast. ~Guillermo Cruz PA-C CT results discussed with patient. Paged surgery. ~Guillermo Cruz PA-C PA: Patient is a 76 y/o female with PMHx of Afib, ulcerative colitis s/p partial colectomy in 1997, HTN who presents to [facility] c/o lower abd pain starting 4 days ago. Patient has been taking Cipro and Flagyl for suspected Colitis but meds are making her nauseous. Patient reports decreased appetite, diarrhea, no vomiting, no blood in stool. ~Guillermo Cruz PA-C Surgical resident called back, CT with PO contrast no longer needed. ~Guillermo Cruz PA-C Patient seen by surgery resident dr. Marquez, will admit to Dr. Crowley, need urgent NGT. Moving patient to main ED. ~Guillermo Cruz PA-C Paged surgery. ~Guillermo Cruz PA-C

## 2022-09-15 NOTE — ED STATDOCS - NS ED ATTENDING STATEMENT MOD
This was a shared visit with the DEIRDRE. I reviewed and verified the documentation and independently performed the documented:

## 2022-09-15 NOTE — ED STATDOCS - NS ED ROS FT
Constitutional: No fevers, chills, or sweats.  Cardiac: No chest pain, exertional dyspnea, orthopnea  Respiratory: No shortness of breath, no cough  GI: no diarrhea, +abd pain, +nausea, +decreased appetite   Neuro: No headaches, no neck pain/stiffness, no numbness  All other systems reviewed and are negative unless otherwise stated in the HPI.

## 2022-09-15 NOTE — H&P ADULT - NSHPPHYSICALEXAM_GEN_ALL_CORE
Physical Examination:   General: AAOx3, NAD  HEENT: NCAT  Cardiac: Normal rate and rhythym, no murmurs, normal peripheral perfusion  Respiratory: Normal rate and effort. CTAB  GI: Soft, nondistended, diffuse abd tenderness   Neuro: No focal deficits. MOCK equally x4, sensation to light touch intact throughout  MSK: FROMx4, no focal bony tenderness, no peripheral edema  Skin: No rash

## 2022-09-15 NOTE — H&P ADULT - ASSESSMENT
78 yo female with SBO, unlikely to be closed loop but high grade obstruction    Plan:   NPO  IV hydration  Monitor vitals signs  Serial abd exams  NS 1L bolus  NG tube to LWS  Lactate acid  Repeat labs in AM  If patient is stable overnight, perform a small bowel series in AM    Plan discucsed with Dr Crowley

## 2022-09-15 NOTE — ED STATDOCS - ATTENDING APP SHARED VISIT CONTRIBUTION OF CARE
I, Flaco Mitchell MD,  performed the initial face to face bedside interview with this patient regarding history of present illness, review of symptoms and relevant past medical, social and family history.  I completed an independent physical examination.  I was the initial provider who evaluated this patient.   I personally saw the patient and performed a substantive portion of the visit including all aspects of the medical decision making.  I have signed out the follow up of any pending tests (i.e. labs, radiological studies) to the DEIRDRE.  I have communicated the patient’s plan of care and disposition with the DEIRDRE.  The history, relevant review of systems, past medical and surgical history, medical decision making, and physical examination was documented by the scribe in my presence and I attest to the accuracy of the documentation.

## 2022-09-15 NOTE — ED STATDOCS - CARE PLAN
1 Principal Discharge DX:	SBO (small bowel obstruction)  Secondary Diagnosis:	Renal mass  Secondary Diagnosis:	Dehydration  Secondary Diagnosis:	Acute renal failure (ARF)

## 2022-09-15 NOTE — ED ADULT NURSE NOTE - NSIMPLEMENTINTERV_GEN_ALL_ED
Implemented All Fall Risk Interventions:  [place] to call system. Call bell, personal items and telephone within reach. Instruct patient to call for assistance. Room bathroom lighting operational. Non-slip footwear when patient is off stretcher. Physically safe environment: no spills, clutter or unnecessary equipment. Stretcher in lowest position, wheels locked, appropriate side rails in place. Provide visual cue, wrist band, yellow gown, etc. Monitor gait and stability. Monitor for mental status changes and reorient to person, place, and time. Review medications for side effects contributing to fall risk. Reinforce activity limits and safety measures with patient and family.

## 2022-09-15 NOTE — PHARMACOTHERAPY INTERVENTION NOTE - COMMENTS
Medication history complete, reviewed medications with patient and  and confirmed with doctor first med hx.

## 2022-09-15 NOTE — ED STATDOCS - PHYSICAL EXAMINATION
General: AAOx3, NAD  HEENT: NCAT  Cardiac: Normal rate and rhythym, no murmurs, normal peripheral perfusion  Respiratory: Normal rate and effort. CTAB  GI: Soft, nondistended, diffuse abd tenderness   Neuro: No focal deficits. MOCK equally x4, sensation to light touch intact throughout  MSK: FROMx4, no focal bony tenderness, no peripheral edema  Skin: No rash Attending: General: AAOx3, NAD  HEENT: NCAT  Cardiac: Normal rate and rhythym, no murmurs, normal peripheral perfusion  Respiratory: Normal rate and effort. CTAB  GI: Soft, nondistended, diffuse abd tenderness   Neuro: No focal deficits. MOCK equally x4, sensation to light touch intact throughout  MSK: FROMx4, no focal bony tenderness, no peripheral edema  Skin: No rash

## 2022-09-15 NOTE — H&P ADULT - NSHPLABSRESULTS_GEN_ALL_CORE
15.6   6.47  )-----------( 359      ( 15 Sep 2022 10:58 )             47.5       09-15    130<L>  |  97  |  24<H>  ----------------------------<  131<H>  3.2<L>   |  23  |  1.51<H>    Ca    9.3      15 Sep 2022 11:52  Mg     1.8     09-15    TPro  6.9  /  Alb  3.3  /  TBili  1.0  /  DBili  x   /  AST  9<L>  /  ALT  12  /  AlkPhos  97  09-15          < from: CT Abdomen and Pelvis w/ IV Cont (09.15.22 @ 11:37) >    IMPRESSION:    Findings compatible with high-grade closed-loop small bowel obstruction.    3.5 cm suprarenal mass with internal speckled calcification. The organ of   origin is not definitively. This is likely of adrenal or left renal   origin. Alternatively, this may be related to a neurogenic tumor.   Consider further correlation with adrenal/renal protocol MRI for further   delineation. Comparison to previous exams also be of value to establish   stability, if available elsewhere.    Question of previous left renal vein thrombosis.    < end of copied text >

## 2022-09-15 NOTE — ED CLERICAL - NS ED CLERK NOTE PRE-ARRIVAL INFORMATION; ADDITIONAL PRE-ARRIVAL INFORMATION
This patient is enrolled in the comprehensive joint replacement (CJR) program and has active care navigation.  This patient can be followed up by the care navigation team within 24 hours. To arrange close follow-up or to obtain additional clinical information about this patient, please call the contact number above. Please call the hospitalist for medical consultation ([phone]) PRIOR to admission or observation.  The hospitalist is part of the care team and can assist in acute medical management. Please call the orthopedic team () for ALL patients who require admission.

## 2022-09-15 NOTE — ED ADULT TRIAGE NOTE - CHIEF COMPLAINT QUOTE
pt presents to ED due to complaints of abdominal pain x 2 days worsening pt states it has been an ongoing problem

## 2022-09-15 NOTE — H&P ADULT - HISTORY OF PRESENT ILLNESS
76 yo female with abdominal pain for a couple weeks , presents with increasing abdominal pain for the last 3 days. Her pain is in the middle of her abdomen, waxing and waning and associated with nausea, burping and decreased appetite. She has afib and last took eliquid today in AM. She had her last BM yesterday. She has a H/o ulceratvie colitis S/P total abd colectomy in 1996.

## 2022-09-15 NOTE — ED ADULT NURSE NOTE - OBJECTIVE STATEMENT
presents to ed with abdominal pain worsening yesterday. patients  states that she has a history of "pouchitis" and has had a colectomy in the past with a pouch. denies ever being diagnosed with diverticulitis but states they are normally treated with cipro flagyl. patient states this feels different than in the past. c/o nausea, denies fevers. pain has been going on for two weeks according to , but worsened yesterday

## 2022-09-16 LAB
ANION GAP SERPL CALC-SCNC: 6 MMOL/L — SIGNIFICANT CHANGE UP (ref 5–17)
ANION GAP SERPL CALC-SCNC: 9 MMOL/L — SIGNIFICANT CHANGE UP (ref 5–17)
APPEARANCE UR: CLEAR — SIGNIFICANT CHANGE UP
BILIRUB UR-MCNC: NEGATIVE — SIGNIFICANT CHANGE UP
BUN SERPL-MCNC: 17 MG/DL — SIGNIFICANT CHANGE UP (ref 7–23)
BUN SERPL-MCNC: 20 MG/DL — SIGNIFICANT CHANGE UP (ref 7–23)
CALCIUM SERPL-MCNC: 8.4 MG/DL — LOW (ref 8.5–10.1)
CALCIUM SERPL-MCNC: 8.5 MG/DL — SIGNIFICANT CHANGE UP (ref 8.5–10.1)
CHLORIDE SERPL-SCNC: 104 MMOL/L — SIGNIFICANT CHANGE UP (ref 96–108)
CHLORIDE SERPL-SCNC: 106 MMOL/L — SIGNIFICANT CHANGE UP (ref 96–108)
CO2 SERPL-SCNC: 24 MMOL/L — SIGNIFICANT CHANGE UP (ref 22–31)
CO2 SERPL-SCNC: 28 MMOL/L — SIGNIFICANT CHANGE UP (ref 22–31)
COLOR SPEC: YELLOW — SIGNIFICANT CHANGE UP
CREAT SERPL-MCNC: 0.79 MG/DL — SIGNIFICANT CHANGE UP (ref 0.5–1.3)
CREAT SERPL-MCNC: 0.85 MG/DL — SIGNIFICANT CHANGE UP (ref 0.5–1.3)
DIFF PNL FLD: NEGATIVE — SIGNIFICANT CHANGE UP
EGFR: 70 ML/MIN/1.73M2 — SIGNIFICANT CHANGE UP
EGFR: 76 ML/MIN/1.73M2 — SIGNIFICANT CHANGE UP
GLUCOSE SERPL-MCNC: 92 MG/DL — SIGNIFICANT CHANGE UP (ref 70–99)
GLUCOSE SERPL-MCNC: 96 MG/DL — SIGNIFICANT CHANGE UP (ref 70–99)
GLUCOSE UR QL: NEGATIVE — SIGNIFICANT CHANGE UP
HCT VFR BLD CALC: 40.6 % — SIGNIFICANT CHANGE UP (ref 34.5–45)
HGB BLD-MCNC: 13.1 G/DL — SIGNIFICANT CHANGE UP (ref 11.5–15.5)
KETONES UR-MCNC: NEGATIVE — SIGNIFICANT CHANGE UP
LDH SERPL L TO P-CCNC: 224 U/L — SIGNIFICANT CHANGE UP (ref 84–241)
LEUKOCYTE ESTERASE UR-ACNC: ABNORMAL
MAGNESIUM SERPL-MCNC: 1.7 MG/DL — SIGNIFICANT CHANGE UP (ref 1.6–2.6)
MCHC RBC-ENTMCNC: 30.2 PG — SIGNIFICANT CHANGE UP (ref 27–34)
MCHC RBC-ENTMCNC: 32.3 GM/DL — SIGNIFICANT CHANGE UP (ref 32–36)
MCV RBC AUTO: 93.5 FL — SIGNIFICANT CHANGE UP (ref 80–100)
NITRITE UR-MCNC: NEGATIVE — SIGNIFICANT CHANGE UP
PH UR: 5 — SIGNIFICANT CHANGE UP (ref 5–8)
PHOSPHATE SERPL-MCNC: 2.1 MG/DL — LOW (ref 2.5–4.5)
PLATELET # BLD AUTO: 215 K/UL — SIGNIFICANT CHANGE UP (ref 150–400)
POTASSIUM SERPL-MCNC: 2.9 MMOL/L — CRITICAL LOW (ref 3.5–5.3)
POTASSIUM SERPL-MCNC: 3.4 MMOL/L — LOW (ref 3.5–5.3)
POTASSIUM SERPL-SCNC: 2.9 MMOL/L — CRITICAL LOW (ref 3.5–5.3)
POTASSIUM SERPL-SCNC: 3.4 MMOL/L — LOW (ref 3.5–5.3)
PROT UR-MCNC: NEGATIVE — SIGNIFICANT CHANGE UP
RBC # BLD: 4.34 M/UL — SIGNIFICANT CHANGE UP (ref 3.8–5.2)
RBC # FLD: 15.4 % — HIGH (ref 10.3–14.5)
SODIUM SERPL-SCNC: 137 MMOL/L — SIGNIFICANT CHANGE UP (ref 135–145)
SODIUM SERPL-SCNC: 140 MMOL/L — SIGNIFICANT CHANGE UP (ref 135–145)
SP GR SPEC: 1.01 — SIGNIFICANT CHANGE UP (ref 1.01–1.02)
UROBILINOGEN FLD QL: NEGATIVE — SIGNIFICANT CHANGE UP
WBC # BLD: 3.42 K/UL — LOW (ref 3.8–10.5)
WBC # FLD AUTO: 3.42 K/UL — LOW (ref 3.8–10.5)

## 2022-09-16 PROCEDURE — 74250 X-RAY XM SM INT 1CNTRST STD: CPT | Mod: 26

## 2022-09-16 PROCEDURE — 99221 1ST HOSP IP/OBS SF/LOW 40: CPT

## 2022-09-16 RX ORDER — SODIUM CHLORIDE 9 MG/ML
1000 INJECTION, SOLUTION INTRAVENOUS
Refills: 0 | Status: DISCONTINUED | OUTPATIENT
Start: 2022-09-16 | End: 2022-09-18

## 2022-09-16 RX ORDER — HEPARIN SODIUM 5000 [USP'U]/ML
5000 INJECTION INTRAVENOUS; SUBCUTANEOUS EVERY 8 HOURS
Refills: 0 | Status: DISCONTINUED | OUTPATIENT
Start: 2022-09-16 | End: 2022-09-19

## 2022-09-16 RX ORDER — POTASSIUM CHLORIDE 20 MEQ
10 PACKET (EA) ORAL ONCE
Refills: 0 | Status: COMPLETED | OUTPATIENT
Start: 2022-09-16 | End: 2022-09-16

## 2022-09-16 RX ORDER — POTASSIUM PHOSPHATE, MONOBASIC POTASSIUM PHOSPHATE, DIBASIC 236; 224 MG/ML; MG/ML
30 INJECTION, SOLUTION INTRAVENOUS ONCE
Refills: 0 | Status: COMPLETED | OUTPATIENT
Start: 2022-09-16 | End: 2022-09-16

## 2022-09-16 RX ORDER — LEVOTHYROXINE SODIUM 125 MCG
12.5 TABLET ORAL AT BEDTIME
Refills: 0 | Status: DISCONTINUED | OUTPATIENT
Start: 2022-09-16 | End: 2022-09-19

## 2022-09-16 RX ORDER — ACETAMINOPHEN 500 MG
1000 TABLET ORAL ONCE
Refills: 0 | Status: COMPLETED | OUTPATIENT
Start: 2022-09-16 | End: 2022-09-16

## 2022-09-16 RX ORDER — PROCHLORPERAZINE MALEATE 5 MG
10 TABLET ORAL EVERY 6 HOURS
Refills: 0 | Status: DISCONTINUED | OUTPATIENT
Start: 2022-09-16 | End: 2022-09-20

## 2022-09-16 RX ADMIN — SODIUM CHLORIDE 125 MILLILITER(S): 9 INJECTION, SOLUTION INTRAVENOUS at 11:15

## 2022-09-16 RX ADMIN — MORPHINE SULFATE 2 MILLIGRAM(S): 50 CAPSULE, EXTENDED RELEASE ORAL at 13:17

## 2022-09-16 RX ADMIN — Medication 110 MILLIGRAM(S): at 13:00

## 2022-09-16 RX ADMIN — PANTOPRAZOLE SODIUM 40 MILLIGRAM(S): 20 TABLET, DELAYED RELEASE ORAL at 09:34

## 2022-09-16 RX ADMIN — Medication 110 MILLIGRAM(S): at 19:59

## 2022-09-16 RX ADMIN — MEMANTINE HYDROCHLORIDE 10 MILLIGRAM(S): 10 TABLET ORAL at 21:37

## 2022-09-16 RX ADMIN — Medication 18.75 MICROGRAM(S): at 06:27

## 2022-09-16 RX ADMIN — Medication 110 MILLIGRAM(S): at 06:31

## 2022-09-16 RX ADMIN — HEPARIN SODIUM 5000 UNIT(S): 5000 INJECTION INTRAVENOUS; SUBCUTANEOUS at 21:38

## 2022-09-16 RX ADMIN — Medication 5 MILLIGRAM(S): at 21:37

## 2022-09-16 RX ADMIN — POTASSIUM PHOSPHATE, MONOBASIC POTASSIUM PHOSPHATE, DIBASIC 83.33 MILLIMOLE(S): 236; 224 INJECTION, SOLUTION INTRAVENOUS at 13:21

## 2022-09-16 RX ADMIN — HEPARIN SODIUM 5000 UNIT(S): 5000 INJECTION INTRAVENOUS; SUBCUTANEOUS at 13:22

## 2022-09-16 RX ADMIN — Medication 1000 MILLIGRAM(S): at 08:15

## 2022-09-16 RX ADMIN — SODIUM CHLORIDE 125 MILLILITER(S): 9 INJECTION, SOLUTION INTRAVENOUS at 20:09

## 2022-09-16 RX ADMIN — HEPARIN SODIUM 5000 UNIT(S): 5000 INJECTION INTRAVENOUS; SUBCUTANEOUS at 06:39

## 2022-09-16 RX ADMIN — Medication 100 MILLIEQUIVALENT(S): at 21:37

## 2022-09-16 RX ADMIN — ATORVASTATIN CALCIUM 40 MILLIGRAM(S): 80 TABLET, FILM COATED ORAL at 21:37

## 2022-09-16 RX ADMIN — MORPHINE SULFATE 2 MILLIGRAM(S): 50 CAPSULE, EXTENDED RELEASE ORAL at 12:56

## 2022-09-16 RX ADMIN — Medication 100 MILLIEQUIVALENT(S): at 11:16

## 2022-09-16 RX ADMIN — Medication 0.5 MILLIGRAM(S): at 07:44

## 2022-09-16 RX ADMIN — Medication 110 MILLIGRAM(S): at 00:44

## 2022-09-16 RX ADMIN — Medication 400 MILLIGRAM(S): at 07:45

## 2022-09-16 NOTE — CONSULT NOTE ADULT - SUBJECTIVE AND OBJECTIVE BOX
NEPHROLOGY CONSULT  HPI:  76 yo female with abdominal pain for a couple weeks , presents with increasing abdominal pain for the last 3 days. Her pain is in the middle of her abdomen, waxing and waning and associated with nausea, burping and decreased appetite. She has afib and last took eliquid today in AM. She had her last BM yesterday. She has a H/o ulcerative colitis S/P total abd colectomy in .  (15 Sep 2022 16:36)    Nephrology:  Patient with UC, total colectomy, now admitted w high grade SBO  Ct with contrast showed possible L renal vein thrombosis, w calcification which I reviewed with the radiologist  There is also a L calcified mass adjacent to L adrenal and kidney , which has been there for a long time and evaluated at [facility]   and is stable       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 at age 38 ovaries were spared       S/P lumbar spinal fusion  L4-5 , 2022      S/P colectomy  1998      S/P tonsillectomy and adenoidectomy      S/P arthroscopy of left shoulder  2016      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      History of lumbar laminectomy  L4-L5          FAMILY HISTORY:  Family history of osteoarthritis (Mother)        MEDICATIONS  (STANDING):  atorvastatin 40 milliGRAM(s) Oral at bedtime  heparin   Injectable 5000 Unit(s) SubCutaneous every 8 hours  melatonin 5 milliGRAM(s) Oral at bedtime  memantine 10 milliGRAM(s) Oral two times a day  metoprolol tartrate IVPB 5 milliGRAM(s) IV Intermittent every 6 hours  pantoprazole  Injectable 40 milliGRAM(s) IV Push daily  potassium phosphate IVPB 30 milliMole(s) IV Intermittent once  sodium chloride 0.9% 1000 milliLiter(s) (125 mL/Hr) IV Continuous <Continuous>    MEDICATIONS  (PRN):  acetaminophen   IVPB .. 1000 milliGRAM(s) IV Intermittent once PRN Mild Pain (1 - 3)  morphine  - Injectable 2 milliGRAM(s) IV Push every 4 hours PRN Severe Pain (7 - 10)  prochlorperazine   Injectable 10 milliGRAM(s) IV Push every 6 hours PRN nausea      Allergies    Demerol (Unknown)    Intolerances    codeine (Vomiting; Nausea)  meperidine (Vomiting; Nausea)      I&O's Summary    15 Sep 2022 07:01  -  16 Sep 2022 07:00  --------------------------------------------------------  IN: 1600 mL / OUT: 350 mL / NET: 1250 mL    16 Sep 2022 07:01  -  16 Sep 2022 13:17  --------------------------------------------------------  IN: 0 mL / OUT: 500 mL / NET: -500 mL          REVIEW OF SYSTEMS:    pt is uncomfortable with SBO symptomology    Vital Signs Last 24 Hrs  T(C): 36.5 (16 Sep 2022 11:42), Max: 36.8 (15 Sep 2022 20:09)  T(F): 97.7 (16 Sep 2022 11:42), Max: 98.2 (15 Sep 2022 20:09)  HR: 78 (16 Sep 2022 13:03) (65 - 88)  BP: 128/90 (16 Sep 2022 13:03) (102/60 - 129/98)  BP(mean): -- w  RR: 16 (16 Sep 2022 11:42) (16 - 17)  SpO2: 100% (16 Sep 2022 11:42) (97% - 100%)    Parameters below as of 16 Sep 2022 11:42  Patient On (Oxygen Delivery Method): room air        Daily     Daily     I&O's Summary    15 Sep 2022 07:01  -  16 Sep 2022 07:00  --------------------------------------------------------  IN: 1600 mL / OUT: 350 mL / NET: 1250 mL    16 Sep 2022 07:01  -  16 Sep 2022 13:17  --------------------------------------------------------  IN: 0 mL / OUT: 500 mL / NET: -500 mL        PHYSICAL EXAM:    General:  Alert, in moderate distress with abd discomfort    Neuro:  Alert and oriented to person, place, and time.   Able to communicate  well.      HEENT:  unable to examine    Cardiovascular:  Regular rate and rhythm, with normal S1 and S2.    Lungs:  clear. no rales, no wheezing, .    Abdomen:      Skin:  Warm, dry    Extremities:  warm,  no cyanosis    LABS:                        13.1   3.42  )-----------( 215      ( 16 Sep 2022 08:58 )             40.6         137  |  104  |  20  ----------------------------<  92  2.9<LL>   |  24  |  0.85    Ca    8.5      16 Sep 2022 08:58  Phos  2.1       Mg     1.7         TPro  6.9  /  Alb  3.3  /  TBili  1.0  /  DBili  x   /  AST  9<L>  /  ALT  12  /  AlkPhos  97  09-15    PT/INR - ( 15 Sep 2022 10:58 )   PT: 23.4 sec;   INR: 2.00 ratio         PTT - ( 15 Sep 2022 10:58 )  PTT:39.5 sec  Urinalysis Basic - ( 16 Sep 2022 08:00 )    Color: Yellow / Appearance: Clear / S.015 / pH: x  Gluc: x / Ketone: Negative  / Bili: Negative / Urobili: Negative   Blood: x / Protein: Negative / Nitrite: Negative   Leuk Esterase: Trace / RBC: 0-2 /HPF / WBC 3-5   Sq Epi: x / Non Sq Epi: Moderate / Bacteria: Moderate      Magnesium, Serum: 1.7 mg/dL ( @ 08:58)  Phosphorus Level, Serum: 2.1 mg/dL ( @ 08:58)

## 2022-09-16 NOTE — CONSULT NOTE ADULT - SUBJECTIVE AND OBJECTIVE BOX
c/c: n/v/abd pain    HPI: 77F, pmh of mild dementia,  ulcerative colitis s/p total colectomy in , Paroxysmal atrial fibrillation, HTN, Hypothyroid, Anxiety/depression, OA s/p recent knee replacement 4 weeks ago @ [facility], who is admitted to surgical service with high grade sbo. Hospitalist service consulted for medical comanagement.   Pt seen and examined on 2N this am. Tearful. Doesn't want to be in the hospital. Was recently hospitalized for knee replacement and prior to that was in the hospital for back surgery.   C/o nausea. NGT clamped. mild abd discomfort. Had flatus yesterday, none today.     ROS: all 10 systems reviewed and is as above otherwise negative.     PMH: as above  PSH: total colectomy, spinal lami/fusion X2, partial hysterectomy, shoulder arthroscopy, shoulder replacement, tonsillectomy/adenoidectomy   F/H: denies significant medical conditions in immediate family members  Social: denies tobacco/etoh use.   Allergies: denies  Home meds: see med rec      Vital Signs Last 24 Hrs  T(C): 36.5 (16 Sep 2022 11:42), Max: 36.8 (15 Sep 2022 20:09)  T(F): 97.7 (16 Sep 2022 11:42), Max: 98.2 (15 Sep 2022 20:09)  HR: 78 (16 Sep 2022 13:03) (65 - 88)  BP: 128/90 (16 Sep 2022 13:03) (102/60 - 129/98)  RR: 16 (16 Sep 2022 11:42) (16 - 17)  SpO2: 100% (16 Sep 2022 11:42) (97% - 100%)    Parameters below as of 16 Sep 2022 11:42  Patient On (Oxygen Delivery Method): room air    PHYSICAL EXAM:    GENERAL: Comfortable, no acute distress   HEAD:  Normocephalic, atraumatic  EYES: EOMI, PERRLA  HEENT: Moist mucous membranes  NECK: Supple, No JVD  NERVOUS SYSTEM:  Alert & Oriented X3, forgetful, Motor Strength 5/5 B/L upper and lower extremities  CHEST/LUNG: Clear to auscultation bilaterally  HEART: Regular rate and rhythm  ABDOMEN: Soft, mildly tender (diffuse), mildly distended, bs+   GENITOURINARY: Voiding, no palpable bladder  EXTREMITIES:   No clubbing, cyanosis, or edema  MUSCULOSKELETAL- No muscle tenderness, no joint tenderness  SKIN-no rash  LABS:                        13.1   3.42  )-----------( 215      ( 16 Sep 2022 08:58 )             40.6     -16    137  |  104  |  20  ----------------------------<  92  2.9<LL>   |  24  |  0.85    Ca    8.5      16 Sep 2022 08:58  Phos  2.1       Mg     1.7         TPro  6.9  /  Alb  3.3  /  TBili  1.0  /  DBili  x   /  AST  9<L>  /  ALT  12  /  AlkPhos  97  09-15    PT/INR - ( 15 Sep 2022 10:58 )   PT: 23.4 sec;   INR: 2.00 ratio         PTT - ( 15 Sep 2022 10:58 )  PTT:39.5 sec  Urinalysis Basic - ( 16 Sep 2022 08:00 )    Color: Yellow / Appearance: Clear / S.015 / pH: x  Gluc: x / Ketone: Negative  / Bili: Negative / Urobili: Negative   Blood: x / Protein: Negative / Nitrite: Negative   Leuk Esterase: Trace / RBC: 0-2 /HPF / WBC 3-5   Sq Epi: x / Non Sq Epi: Moderate / Bacteria: Moderate      CT Abdomen and Pelvis w/ IV Cont (09.15.22 @ 11:37) >  FINDINGS:  LOWER CHEST: Mild dependent changes are seen posteriorly..    LIVER: Within normal limits.  BILE DUCTS: Normal caliber.  GALLBLADDER: Within normal limits.  SPLEEN: Within normal limits.  PANCREAS: Within normal limits.  ADRENALS: There is a 3.5 cm left suprarenal mass, with internal speckled   calcification. The organ of origin is uncertain. This may be adrenal or   renal in origin. A fat plane appears preserved between this lesion and   the spleen or pancreas and therefore this is unlikely to be colonic or   pancreatic in origin.  KIDNEYS/URETERS: Subcentimeter cortical hypodensities of the kidneys   likely represent small cysts. Above-described left suprarenal mass is of   uncertain origin. There is a focal area of loss of the intervening fat   plane between the kidney and the lesion, possibility of renal origin is   raised.    BLADDER: Within normal limits.  REPRODUCTIVE ORGANS: Hysterectomy.    BOWEL: Subtotal colectomy changes are appreciated. There is marked   dilatation of small bowel loops measuring up to 5.5 cm in diameter. There   is associated mild wall thickening. Within the pelvis image 2:63, there   are 2 bowel loops which appear focally narrowed. The intervening bowel   loop is the most distended, though bowel loops proximal to this are also   dilated. Findings are highly suspicious for high-grade closed-loop small   bowel obstruction. There is a small hiatal hernia.      PERITONEUM: No ascites.  VESSELS: Atherosclerotic changes. Calcification seen along the left renal   vein, possibly related to prior no vein thrombosis.  RETROPERITONEUM/LYMPH NODES: No lymphadenopathy.  ABDOMINAL WALL: Postsurgical changes.  BONES: Sclerotic deformity and multilevel degenerative changes of the   spine appreciated. There is generalized osteopenia. Multilevel   vertebroplasty changes are appreciated at L2-L5. Associated posterior   pedicle screws and rods are also appreciated at these levels.    IMPRESSION:    Findings compatible with high-grade closed-loop small bowel obstruction.    3.5 cm suprarenal mass with internal speckled calcification. The organ of   origin is not definitively. This is likely of adrenal or left renal   origin. Alternatively, this may be related to a neurogenic tumor.   Consider further correlation with adrenal/renal protocol MRI for further   delineation. Comparison to previous exams also be of value to establish   stability, if available elsewhere.    Question of previous left renal vein thrombosis.      ASSESSMENT AND PLAN:  77f, PMH as above a/w    1. High grade sbo    2. Left suprarenal mass:    3. ?Renal V thrombosis.     4. Paroxysmal atrial fibrillation      5. Ulcerative colitis.     6. HTN      77F, pmh of mild dementia,  ulcerative colitis s/p total colectomy in , Paroxysmal atrial fibrillation, HTN, Hypothyroid, Anxiety/depression, OA s/p recent knee replacement 4 weeks ago @ [place], who is admitted to surgical service with high grade sbo. Hospitalist service consulted for medical comanagement.   Pt seen and examined on 2N this am. Tearful. Doesn't want to be in the hospital. Was recently hospitalized for knee replacement and prior to that was in the hospital for back surgery.   C/o nausea. NGT clamped. mild abd discomfort. Had flatus yesterday, none today.                    c/c: n/v/abd pain    HPI: 77F, pmh of mild dementia,  ulcerative colitis s/p total colectomy in , Paroxysmal atrial fibrillation, HTN, Hypothyroid, Anxiety/depression, OA s/p recent knee replacement 4 weeks ago @ [facility], who is admitted to surgical service with high grade sbo. Hospitalist service consulted for medical comanagement.   Pt seen and examined on 2N this am. Tearful. Doesn't want to be in the hospital. Was recently hospitalized for knee replacement and prior to that was in the hospital for back surgery.   C/o nausea. NGT clamped. mild abd discomfort. Had flatus yesterday, none today.     ROS: all 10 systems reviewed and is as above otherwise negative.     PMH: as above  PSH: total colectomy, spinal lami/fusion X2, partial hysterectomy, shoulder arthroscopy, shoulder replacement, tonsillectomy/adenoidectomy   F/H: denies significant medical conditions in immediate family members  Social: denies tobacco/etoh use.   Allergies: denies  Home meds: see med rec      Vital Signs Last 24 Hrs  T(C): 36.5 (16 Sep 2022 11:42), Max: 36.8 (15 Sep 2022 20:09)  T(F): 97.7 (16 Sep 2022 11:42), Max: 98.2 (15 Sep 2022 20:09)  HR: 78 (16 Sep 2022 13:03) (65 - 88)  BP: 128/90 (16 Sep 2022 13:03) (102/60 - 129/98)  RR: 16 (16 Sep 2022 11:42) (16 - 17)  SpO2: 100% (16 Sep 2022 11:42) (97% - 100%)    Parameters below as of 16 Sep 2022 11:42  Patient On (Oxygen Delivery Method): room air    PHYSICAL EXAM:    GENERAL: Comfortable, no acute distress   HEAD:  Normocephalic, atraumatic  EYES: EOMI, PERRLA  HEENT: Moist mucous membranes  NECK: Supple, No JVD  NERVOUS SYSTEM:  Alert & Oriented X3, forgetful, Motor Strength 5/5 B/L upper and lower extremities  CHEST/LUNG: Clear to auscultation bilaterally  HEART: Regular rate and rhythm  ABDOMEN: Soft, mildly tender (diffuse), mildly distended, bs+   GENITOURINARY: Voiding, no palpable bladder  EXTREMITIES:   No clubbing, cyanosis, or edema  MUSCULOSKELETAL- No muscle tenderness, no joint tenderness  SKIN-no rash  LABS:                        13.1   3.42  )-----------( 215      ( 16 Sep 2022 08:58 )             40.6     -16    137  |  104  |  20  ----------------------------<  92  2.9<LL>   |  24  |  0.85    Ca    8.5      16 Sep 2022 08:58  Phos  2.1       Mg     1.7         TPro  6.9  /  Alb  3.3  /  TBili  1.0  /  DBili  x   /  AST  9<L>  /  ALT  12  /  AlkPhos  97  09-15    PT/INR - ( 15 Sep 2022 10:58 )   PT: 23.4 sec;   INR: 2.00 ratio         PTT - ( 15 Sep 2022 10:58 )  PTT:39.5 sec  Urinalysis Basic - ( 16 Sep 2022 08:00 )    Color: Yellow / Appearance: Clear / S.015 / pH: x  Gluc: x / Ketone: Negative  / Bili: Negative / Urobili: Negative   Blood: x / Protein: Negative / Nitrite: Negative   Leuk Esterase: Trace / RBC: 0-2 /HPF / WBC 3-5   Sq Epi: x / Non Sq Epi: Moderate / Bacteria: Moderate      CT Abdomen and Pelvis w/ IV Cont (09.15.22 @ 11:37) >  FINDINGS:  LOWER CHEST: Mild dependent changes are seen posteriorly..    LIVER: Within normal limits.  BILE DUCTS: Normal caliber.  GALLBLADDER: Within normal limits.  SPLEEN: Within normal limits.  PANCREAS: Within normal limits.  ADRENALS: There is a 3.5 cm left suprarenal mass, with internal speckled   calcification. The organ of origin is uncertain. This may be adrenal or   renal in origin. A fat plane appears preserved between this lesion and   the spleen or pancreas and therefore this is unlikely to be colonic or   pancreatic in origin.  KIDNEYS/URETERS: Subcentimeter cortical hypodensities of the kidneys   likely represent small cysts. Above-described left suprarenal mass is of   uncertain origin. There is a focal area of loss of the intervening fat   plane between the kidney and the lesion, possibility of renal origin is   raised.    BLADDER: Within normal limits.  REPRODUCTIVE ORGANS: Hysterectomy.    BOWEL: Subtotal colectomy changes are appreciated. There is marked   dilatation of small bowel loops measuring up to 5.5 cm in diameter. There   is associated mild wall thickening. Within the pelvis image 2:63, there   are 2 bowel loops which appear focally narrowed. The intervening bowel   loop is the most distended, though bowel loops proximal to this are also   dilated. Findings are highly suspicious for high-grade closed-loop small   bowel obstruction. There is a small hiatal hernia.      PERITONEUM: No ascites.  VESSELS: Atherosclerotic changes. Calcification seen along the left renal   vein, possibly related to prior no vein thrombosis.  RETROPERITONEUM/LYMPH NODES: No lymphadenopathy.  ABDOMINAL WALL: Postsurgical changes.  BONES: Sclerotic deformity and multilevel degenerative changes of the   spine appreciated. There is generalized osteopenia. Multilevel   vertebroplasty changes are appreciated at L2-L5. Associated posterior   pedicle screws and rods are also appreciated at these levels.    IMPRESSION:    Findings compatible with high-grade closed-loop small bowel obstruction.    3.5 cm suprarenal mass with internal speckled calcification. The organ of   origin is not definitively. This is likely of adrenal or left renal   origin. Alternatively, this may be related to a neurogenic tumor.   Consider further correlation with adrenal/renal protocol MRI for further   delineation. Comparison to previous exams also be of value to establish   stability, if available elsewhere.    Question of previous left renal vein thrombosis.    MEDICATIONS  (STANDING):  atorvastatin 40 milliGRAM(s) Oral at bedtime  heparin   Injectable 5000 Unit(s) SubCutaneous every 8 hours  melatonin 5 milliGRAM(s) Oral at bedtime  memantine 10 milliGRAM(s) Oral two times a day  metoprolol tartrate IVPB 5 milliGRAM(s) IV Intermittent every 6 hours  pantoprazole  Injectable 40 milliGRAM(s) IV Push daily  sodium chloride 0.9% 1000 milliLiter(s) (125 mL/Hr) IV Continuous <Continuous>    MEDICATIONS  (PRN):  acetaminophen   IVPB .. 1000 milliGRAM(s) IV Intermittent once PRN Mild Pain (1 - 3)  morphine  - Injectable 2 milliGRAM(s) IV Push every 4 hours PRN Severe Pain (7 - 10)  prochlorperazine   Injectable 10 milliGRAM(s) IV Push every 6 hours PRN nausea    ASSESSMENT AND PLAN:  77f, PMH as above a/w    1. High grade SBO:  -NPO  -IVF  -NGT  -compazine prn.  -replete hypokalemia/hypophosphatemia  -for small bowel series per surgical team.     2. Paroxysmal atrial fibrillation:  -iv lopressor while npo.   -resume multaq when able to take pills.   -eliquis on hold.   -chadvasc 5, if unable to resume eliquis soon, may need bridge with therapeutic lovenox  -cardiology consulted.     3. HTN:  -on iv bb    4. Hypothyroid:  -iv synthroid until able to take oral medication    5. Ulcerative colitis:  -resume mercaptopurine/mesalamine once sbo improved    6. Dementia.   -resume namenda once sbo improved.     7. DVT px:  -on hep sq.     thanks will follow.

## 2022-09-16 NOTE — PROGRESS NOTE ADULT - ASSESSMENT
78 yo female with SBO, unlikely to be closed loop but high grade obstruction  lactic acid/wbc normal    Plan:   Monitor vitals signs  NPO  small bowel series today with gastrografin  Serial abd exams  continue IV fluid  DVT/GI PPX    Plan discucsed with Dr Crowley

## 2022-09-16 NOTE — PATIENT PROFILE ADULT - BRAND OF COVID-19 VACCINATION
Pfizer dose 1 and 2
Additional Notes: Improved today, but not at treatment goal.\\nScarring and EIC's noted on exam.
Render Risk Assessment In Note?: no
Detail Level: Simple

## 2022-09-16 NOTE — PROGRESS NOTE ADULT - SUBJECTIVE AND OBJECTIVE BOX
SURGERY DAILY PROGRESS NOTE:     Subjective:  Patient seen and examined this AM at bedside. No acute events overnight and patient resting comfortably. Patient feels better with abdominal pain, no nausea. NG tube intact. Passing flatus, but no BM. Denies fever/chills, shortness of breath, chest pain. VS reviewed    Objective:    MEDICATIONS  (STANDING):  atorvastatin 40 milliGRAM(s) Oral at bedtime  heparin   Injectable 5000 Unit(s) SubCutaneous every 8 hours  melatonin 5 milliGRAM(s) Oral at bedtime  memantine 10 milliGRAM(s) Oral two times a day  metoprolol tartrate IVPB 5 milliGRAM(s) IV Intermittent every 6 hours  pantoprazole  Injectable 40 milliGRAM(s) IV Push daily  sodium chloride 0.9%. 1000 milliLiter(s) (125 mL/Hr) IV Continuous <Continuous>    MEDICATIONS  (PRN):  acetaminophen   IVPB .. 1000 milliGRAM(s) IV Intermittent once PRN Mild Pain (1 - 3)  morphine  - Injectable 2 milliGRAM(s) IV Push every 4 hours PRN Severe Pain (7 - 10)      Vital Signs Last 24 Hrs  T(C): 36.8 (15 Sep 2022 20:09), Max: 36.8 (15 Sep 2022 10:15)  T(F): 98.2 (15 Sep 2022 20:09), Max: 98.2 (15 Sep 2022 10:15)  HR: 65 (15 Sep 2022 20:09) (65 - 88)  BP: 102/60 (15 Sep 2022 20:09) (102/60 - 136/104)  BP(mean): --  RR: 16 (15 Sep 2022 20:09) (16 - 17)  SpO2: 97% (15 Sep 2022 20:09) (97% - 98%)    Parameters below as of 15 Sep 2022 20:09  Patient On (Oxygen Delivery Method): room air          PHYSICAL EXAM   GENERAL: NAD, AOx3, well developed  HEAD: Atraumatic, normocephalic  EYES: EOMI, PERRLA, conjunctiva and sclera clear  ENT: moist mucous membrane  NECK: supple, No JVD, midline trachea  CHEST/LUNG: unlabored respirations b/l  Heart: S1, S2 normal  ABDOMEN: soft, nondistended, mildly tender to palpation. well healed surgical sites. NG tube on LWS  EXTREMITIES: +2 peripheral pulses, brisk cap refill. no clubbing, cyanosis or edema  NERVOUS SYSTEM: AOx3, speech clear, no neuro-deficits  MSK: full ROM, no deformities  SKIN: warm to touch, no rash or lesions      I&O's Detail    15 Sep 2022 07:01  -  16 Sep 2022 07:00  --------------------------------------------------------  IN:    IV PiggyBack: 100 mL    sodium chloride 0.9%: 1500 mL  Total IN: 1600 mL    OUT:    Nasogastric/Oral tube (mL): 350 mL  Total OUT: 350 mL    Total NET: 1250 mL      16 Sep 2022 07:01  -  16 Sep 2022 10:04  --------------------------------------------------------  IN:  Total IN: 0 mL    OUT:    Voided (mL): 500 mL  Total OUT: 500 mL    Total NET: -500 mL          Daily Height in cm: 154.94 (15 Sep 2022 10:15)    Daily     LABS:                        13.1   3.42  )-----------( 215      ( 16 Sep 2022 08:58 )             40.6     09-16    137  |  104  |  20  ----------------------------<  92  2.9<LL>   |  24  |  0.85    Ca    8.5      16 Sep 2022 08:58  Phos  2.1     09-16  Mg     1.7     09-16    TPro  6.9  /  Alb  3.3  /  TBili  1.0  /  DBili  x   /  AST  9<L>  /  ALT  12  /  AlkPhos  97  09-15    PT/INR - ( 15 Sep 2022 10:58 )   PT: 23.4 sec;   INR: 2.00 ratio         PTT - ( 15 Sep 2022 10:58 )  PTT:39.5 sec  Urinalysis Basic - ( 16 Sep 2022 08:00 )    Color: Yellow / Appearance: Clear / S.015 / pH: x  Gluc: x / Ketone: Negative  / Bili: Negative / Urobili: Negative   Blood: x / Protein: Negative / Nitrite: Negative   Leuk Esterase: Trace / RBC: 0-2 /HPF / WBC 3-5   Sq Epi: x / Non Sq Epi: Moderate / Bacteria: Moderate        RADIOLOGY & ADDITIONAL STUDIES:

## 2022-09-16 NOTE — CONSULT NOTE ADULT - SUBJECTIVE AND OBJECTIVE BOX
76 yo female known to my associate Dr. Dyllan Joel for history of iron deficiency and macrocytosis now admitted  with abdominal pain for a couple weeks , presents with increasing abdominal pain for the last 3 days. Her pain is in the middle of her abdomen, waxing and waning and associated with nausea, burping and decreased appetite. She has afib and is on eliquis She has a H/o ulceratvie colitis S/P total abd colectomy in 1996.     PAST MEDICAL HISTORY:  Anxiety and depression     Atrial fibrillation on Eliquis    Depression     H/O insomnia     H/O osteoporosis     History of osteoarthritis right knee    Hypertension     Hypothyroid     Lumbar disc disorder     Mild dementia     Osteoporosis     Scoliosis     Spinal stenosis     Spondylolisthesis     Ulcerative colitis signs of precancer had partial colectomy.     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     FAMILY HISTORY:  Mother  Still living? No  Family history of osteoarthritis, Age at diagnosis: Age Unknown.    Social History:  · Substance use	No    acetaminophen   IVPB .. 1000 milliGRAM(s) IV Intermittent once PRN  atorvastatin 40 milliGRAM(s) Oral at bedtime  heparin   Injectable 5000 Unit(s) SubCutaneous every 8 hours  melatonin 5 milliGRAM(s) Oral at bedtime  memantine 10 milliGRAM(s) Oral two times a day  metoprolol tartrate IVPB 5 milliGRAM(s) IV Intermittent every 6 hours  morphine  - Injectable 2 milliGRAM(s) IV Push every 4 hours PRN  pantoprazole  Injectable 40 milliGRAM(s) IV Push daily  sodium chloride 0.9%. 1000 milliLiter(s) IV Continuous <Continuous>      codeine (Vomiting; Nausea)  Demerol (Unknown)  meperidine (Vomiting; Nausea)      ROS otherwise negative     T(C): 36.8 (09-15-22 @ 20:09), Max: 36.8 (09-15-22 @ 10:15)  HR: 65 (09-15-22 @ 20:09) (65 - 88)  BP: 102/60 (09-15-22 @ 20:09) (102/60 - 136/104)  RR: 16 (09-15-22 @ 20:09) (16 - 17)  SpO2: 97% (09-15-22 @ 20:09) (97% - 98%)  PHYSICAL EXAM  Gen:  laying in bed, nad  H:  anicteric, eomi  Ext:  no edema                          15.6   6.47  )-----------( 359      ( 15 Sep 2022 10:58 )             47.5   09-15    130<L>  |  97  |  24<H>  ----------------------------<  131<H>  3.2<L>   |  23  |  1.51<H>    Ca    9.3      15 Sep 2022 11:52  Mg     1.8     09-15    TPro  6.9  /  Alb  3.3  /  TBili  1.0  /  DBili  x   /  AST  9<L>  /  ALT  12  /  AlkPhos  97  09-15   78 yo female known to my associate Dr. Dyllan Joel for history of iron deficiency and macrocytosis now admitted  with abdominal pain for a couple weeks , presents with increasing abdominal pain for the last 3 days. Her pain is in the middle of her abdomen, waxing and waning and associated with nausea, burping and decreased appetite. She has afib and is on eliquis She has a H/o ulceratvie colitis S/P total abd colectomy in 1996.     PAST MEDICAL HISTORY:  Anxiety and depression     Atrial fibrillation on Eliquis    Depression     H/O insomnia     H/O osteoporosis     History of osteoarthritis right knee    Hypertension     Hypothyroid     Lumbar disc disorder     Mild dementia     Osteoporosis     Scoliosis     Spinal stenosis     Spondylolisthesis     Ulcerative colitis signs of precancer had partial colectomy.     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     FAMILY HISTORY:  Mother  Still living? No  Family history of osteoarthritis, Age at diagnosis: Age Unknown.    Social History:  · Substance use	No    acetaminophen   IVPB .. 1000 milliGRAM(s) IV Intermittent once PRN  atorvastatin 40 milliGRAM(s) Oral at bedtime  heparin   Injectable 5000 Unit(s) SubCutaneous every 8 hours  melatonin 5 milliGRAM(s) Oral at bedtime  memantine 10 milliGRAM(s) Oral two times a day  metoprolol tartrate IVPB 5 milliGRAM(s) IV Intermittent every 6 hours  morphine  - Injectable 2 milliGRAM(s) IV Push every 4 hours PRN  pantoprazole  Injectable 40 milliGRAM(s) IV Push daily  sodium chloride 0.9%. 1000 milliLiter(s) IV Continuous <Continuous>      codeine (Vomiting; Nausea)  Demerol (Unknown)  meperidine (Vomiting; Nausea)      ROS otherwise negative     T(C): 36.8 (09-15-22 @ 20:09), Max: 36.8 (09-15-22 @ 10:15)  HR: 65 (09-15-22 @ 20:09) (65 - 88)  BP: 102/60 (09-15-22 @ 20:09) (102/60 - 136/104)  RR: 16 (09-15-22 @ 20:09) (16 - 17)  SpO2: 97% (09-15-22 @ 20:09) (97% - 98%)  PHYSICAL EXAM  Gen:  laying in bed, nad  H:  dry MMs  Ext:  no edema                          15.6   6.47  )-----------( 359      ( 15 Sep 2022 10:58 )             47.5   09-15    130<L>  |  97  |  24<H>  ----------------------------<  131<H>  3.2<L>   |  23  |  1.51<H>    Ca    9.3      15 Sep 2022 11:52  Mg     1.8     09-15    TPro  6.9  /  Alb  3.3  /  TBili  1.0  /  DBili  x   /  AST  9<L>  /  ALT  12  /  AlkPhos  97  09-15

## 2022-09-16 NOTE — PATIENT PROFILE ADULT - FALL HARM RISK - UNIVERSAL INTERVENTIONS
Bed in lowest position, wheels locked, appropriate side rails in place/Call bell, personal items and telephone in reach/Instruct patient to call for assistance before getting out of bed or chair/Non-slip footwear when patient is out of bed/[place] to call system/Physically safe environment - no spills, clutter or unnecessary equipment/Purposeful Proactive Rounding/Room/bathroom lighting operational, light cord in reach

## 2022-09-16 NOTE — CONSULT NOTE ADULT - ASSESSMENT
76 yo female with abdominal pain for a couple weeks , presents with increasing abdominal pain for the last 3 days. Her pain is in the middle of her abdomen, waxing and waning and associated with nausea, burping and decreased appetite. She has afib and last took eliquid today in AM. She had her last BM yesterday. She has a H/o ulcerative colitis S/P total abd colectomy in 1996.  (15 Sep 2022 16:36)    Nephrology:  Patient with UC, total colectomy, now admitted w high grade SBO  Ct with contrast showed possible L renal vein thrombosis, w calcification which I reviewed with the radiologist  There is also a L calcified mass adjacent to L adrenal and kidney , which has been there for a long time and evaluated at [facility]   and is stable     A/P   As above  The L renal vein findings on the CT may be due to an old calcified thrombus and no acute intervention needed  and the L adrenal/ renal calcified old as per family and has been stable  Renal function is normal  Will confirm w [facility] Docs / radiology reports as to the presence of the calcified L renal vein in prior imaging studies.  Pt is having IV replacement of the electrolyte deficiencies  Replace Mag as needed as well  Monitor for DONNIE

## 2022-09-16 NOTE — CONSULT NOTE ADULT - ASSESSMENT
76 yo female known to my associate Dr. Dyllan Melgar for history of iron deficiency and macrocytosis now admitted  with SBO in setting of H/o ulceratvie colitis S/P total abd colectomy in 1996.     -CBC reviewed and unremarkable  -Cr up to 1.51 (baseline 1.1 in our office in April 2022); ?prerenal azotemia  -prior anemia due to mercaptopurine  -scheduled to f/u with Dr. melgar in our office in Nov 2022  -please call with any questions.     Jared Clemente MD  Hematology/Oncology  Weekends and nights please call [phone] for MD on call  Office Phone: [phone]  Office Fax:  [phone]  [address] [place] [address]

## 2022-09-16 NOTE — PATIENT PROFILE ADULT - IS PATIENT POST-MENOPAUSAL?
no strength deficits were identified/Except RLE grossly assessed against gravity with demonstration of functional strength
yes

## 2022-09-17 LAB
ANION GAP SERPL CALC-SCNC: 6 MMOL/L — SIGNIFICANT CHANGE UP (ref 5–17)
APTT BLD: 31 SEC — SIGNIFICANT CHANGE UP (ref 27.5–35.5)
BUN SERPL-MCNC: 16 MG/DL — SIGNIFICANT CHANGE UP (ref 7–23)
CALCIUM SERPL-MCNC: 7.6 MG/DL — LOW (ref 8.5–10.1)
CHLORIDE SERPL-SCNC: 113 MMOL/L — HIGH (ref 96–108)
CO2 SERPL-SCNC: 24 MMOL/L — SIGNIFICANT CHANGE UP (ref 22–31)
CREAT SERPL-MCNC: 0.57 MG/DL — SIGNIFICANT CHANGE UP (ref 0.5–1.3)
EGFR: 92 ML/MIN/1.73M2 — SIGNIFICANT CHANGE UP
GLUCOSE SERPL-MCNC: 103 MG/DL — HIGH (ref 70–99)
HCT VFR BLD CALC: 34.6 % — SIGNIFICANT CHANGE UP (ref 34.5–45)
HGB BLD-MCNC: 11.2 G/DL — LOW (ref 11.5–15.5)
INR BLD: 1.2 RATIO — HIGH (ref 0.88–1.16)
MAGNESIUM SERPL-MCNC: 1.8 MG/DL — SIGNIFICANT CHANGE UP (ref 1.6–2.6)
MCHC RBC-ENTMCNC: 30.2 PG — SIGNIFICANT CHANGE UP (ref 27–34)
MCHC RBC-ENTMCNC: 32.4 GM/DL — SIGNIFICANT CHANGE UP (ref 32–36)
MCV RBC AUTO: 93.3 FL — SIGNIFICANT CHANGE UP (ref 80–100)
PHOSPHATE SERPL-MCNC: 1.3 MG/DL — LOW (ref 2.5–4.5)
PLATELET # BLD AUTO: 211 K/UL — SIGNIFICANT CHANGE UP (ref 150–400)
POTASSIUM SERPL-MCNC: 3.9 MMOL/L — SIGNIFICANT CHANGE UP (ref 3.5–5.3)
POTASSIUM SERPL-SCNC: 3.9 MMOL/L — SIGNIFICANT CHANGE UP (ref 3.5–5.3)
PROTHROM AB SERPL-ACNC: 14 SEC — HIGH (ref 10.5–13.4)
RBC # BLD: 3.71 M/UL — LOW (ref 3.8–5.2)
RBC # FLD: 15.9 % — HIGH (ref 10.3–14.5)
SODIUM SERPL-SCNC: 143 MMOL/L — SIGNIFICANT CHANGE UP (ref 135–145)
WBC # BLD: 5.44 K/UL — SIGNIFICANT CHANGE UP (ref 3.8–10.5)
WBC # FLD AUTO: 5.44 K/UL — SIGNIFICANT CHANGE UP (ref 3.8–10.5)

## 2022-09-17 PROCEDURE — 99232 SBSQ HOSP IP/OBS MODERATE 35: CPT

## 2022-09-17 PROCEDURE — 74021 RADEX ABDOMEN 3+ VIEWS: CPT | Mod: 26

## 2022-09-17 RX ORDER — CIPROFLOXACIN LACTATE 400MG/40ML
400 VIAL (ML) INTRAVENOUS EVERY 12 HOURS
Refills: 0 | Status: DISCONTINUED | OUTPATIENT
Start: 2022-09-17 | End: 2022-09-20

## 2022-09-17 RX ORDER — ACETAMINOPHEN 500 MG
1000 TABLET ORAL ONCE
Refills: 0 | Status: COMPLETED | OUTPATIENT
Start: 2022-09-17 | End: 2022-09-17

## 2022-09-17 RX ORDER — POTASSIUM CHLORIDE 20 MEQ
10 PACKET (EA) ORAL ONCE
Refills: 0 | Status: COMPLETED | OUTPATIENT
Start: 2022-09-17 | End: 2022-09-17

## 2022-09-17 RX ORDER — CIPROFLOXACIN LACTATE 400MG/40ML
VIAL (ML) INTRAVENOUS
Refills: 0 | Status: DISCONTINUED | OUTPATIENT
Start: 2022-09-17 | End: 2022-09-20

## 2022-09-17 RX ORDER — METRONIDAZOLE 500 MG
500 TABLET ORAL EVERY 8 HOURS
Refills: 0 | Status: DISCONTINUED | OUTPATIENT
Start: 2022-09-17 | End: 2022-09-20

## 2022-09-17 RX ORDER — POTASSIUM PHOSPHATE, MONOBASIC POTASSIUM PHOSPHATE, DIBASIC 236; 224 MG/ML; MG/ML
15 INJECTION, SOLUTION INTRAVENOUS ONCE
Refills: 0 | Status: COMPLETED | OUTPATIENT
Start: 2022-09-17 | End: 2022-09-17

## 2022-09-17 RX ORDER — CIPROFLOXACIN LACTATE 400MG/40ML
400 VIAL (ML) INTRAVENOUS ONCE
Refills: 0 | Status: COMPLETED | OUTPATIENT
Start: 2022-09-17 | End: 2022-09-17

## 2022-09-17 RX ADMIN — Medication 12.5 MICROGRAM(S): at 05:48

## 2022-09-17 RX ADMIN — HEPARIN SODIUM 5000 UNIT(S): 5000 INJECTION INTRAVENOUS; SUBCUTANEOUS at 22:11

## 2022-09-17 RX ADMIN — Medication 30 MILLILITER(S): at 20:15

## 2022-09-17 RX ADMIN — Medication 110 MILLIGRAM(S): at 00:07

## 2022-09-17 RX ADMIN — Medication 110 MILLIGRAM(S): at 23:02

## 2022-09-17 RX ADMIN — Medication 1000 MILLIGRAM(S): at 05:24

## 2022-09-17 RX ADMIN — Medication 12.5 MICROGRAM(S): at 22:10

## 2022-09-17 RX ADMIN — Medication 100 MILLIGRAM(S): at 22:10

## 2022-09-17 RX ADMIN — Medication 100 MILLIEQUIVALENT(S): at 10:08

## 2022-09-17 RX ADMIN — POTASSIUM PHOSPHATE, MONOBASIC POTASSIUM PHOSPHATE, DIBASIC 62.5 MILLIMOLE(S): 236; 224 INJECTION, SOLUTION INTRAVENOUS at 18:28

## 2022-09-17 RX ADMIN — MEMANTINE HYDROCHLORIDE 10 MILLIGRAM(S): 10 TABLET ORAL at 22:12

## 2022-09-17 RX ADMIN — Medication 200 MILLIGRAM(S): at 11:22

## 2022-09-17 RX ADMIN — HEPARIN SODIUM 5000 UNIT(S): 5000 INJECTION INTRAVENOUS; SUBCUTANEOUS at 13:15

## 2022-09-17 RX ADMIN — Medication 110 MILLIGRAM(S): at 12:02

## 2022-09-17 RX ADMIN — PANTOPRAZOLE SODIUM 40 MILLIGRAM(S): 20 TABLET, DELAYED RELEASE ORAL at 10:09

## 2022-09-17 RX ADMIN — Medication 5 MILLIGRAM(S): at 22:11

## 2022-09-17 RX ADMIN — MORPHINE SULFATE 2 MILLIGRAM(S): 50 CAPSULE, EXTENDED RELEASE ORAL at 10:10

## 2022-09-17 RX ADMIN — HEPARIN SODIUM 5000 UNIT(S): 5000 INJECTION INTRAVENOUS; SUBCUTANEOUS at 05:48

## 2022-09-17 RX ADMIN — ATORVASTATIN CALCIUM 40 MILLIGRAM(S): 80 TABLET, FILM COATED ORAL at 22:11

## 2022-09-17 RX ADMIN — Medication 110 MILLIGRAM(S): at 05:49

## 2022-09-17 RX ADMIN — MEMANTINE HYDROCHLORIDE 10 MILLIGRAM(S): 10 TABLET ORAL at 10:13

## 2022-09-17 RX ADMIN — Medication 100 MILLIGRAM(S): at 13:15

## 2022-09-17 RX ADMIN — SODIUM CHLORIDE 125 MILLILITER(S): 9 INJECTION, SOLUTION INTRAVENOUS at 04:54

## 2022-09-17 RX ADMIN — Medication 400 MILLIGRAM(S): at 04:54

## 2022-09-17 NOTE — PROGRESS NOTE ADULT - ASSESSMENT
78 yo female with SBO, unlikely to be closed loop but high grade obstruction  lactic acid/wbc normal    Plan:   monitor vitals signs  monitor BF, 2 BMs overnight  NPO  keep NGT today, possible clamp trial  serial abd exams, improving  will hold off surgery for now, continue conservative management  continue IV fluid  DVT/GI PPX    Plan discussed with Dr Crowley

## 2022-09-17 NOTE — PROGRESS NOTE ADULT - ASSESSMENT
76 y/o female with history of mild dementia, ulcerative colitis s/p total colectomy in 1996, Paroxysmal atrial fibrillation, HTN, Hypothyroid, Anxiety/depression, OA s/p recent knee replacement 4 weeks ago who is admitted to surgical service with high grade sbo.    # High grade SBO:  -NPO  -IVF  -NGT  -compazine prn  -for small bowel series per surgical team.     # Paroxysmal atrial fibrillation:  - IV lopressor while npo.   - Rate controlled  -resume multaq when able to take pills.   -eliquis on hold   -chadvasc 5, if unable to resume eliquis soon, may need bridge with therapeutic lovenox  -cardiology consulted.     # HTN:  -on iv bb    # Hypothyroid:  -iv synthroid until able to take oral medication    # Ulcerative colitis:  -resume mercaptopurine/mesalamine once sbo improved    # Dementia.   -resume namenda once sbo improved.     # DVT px:  -on hep sq.

## 2022-09-17 NOTE — PROGRESS NOTE ADULT - ATTENDING COMMENTS
agree with above. the patient passed the contrast on this ams xray and had 2 bms yesterday  will start cipro and flagyl for presumed pouchitis.

## 2022-09-17 NOTE — PROGRESS NOTE ADULT - SUBJECTIVE AND OBJECTIVE BOX
SURGERY DAILY PROGRESS NOTE:     Subjective:  Patient seen and examined this AM at bedside. No acute events overnight and patient resting comfortably. Has been passing gas overnight and had two explosive BM witnessed by RN. PT does not recall. Patient feels better with abdominal pain, no nausea. NG tube intact. Denies fever/chills, shortness of breath, chest pain. VS reviewed    Objective:    MEDICATIONS  (STANDING):  atorvastatin 40 milliGRAM(s) Oral at bedtime  heparin   Injectable 5000 Unit(s) SubCutaneous every 8 hours  melatonin 5 milliGRAM(s) Oral at bedtime  memantine 10 milliGRAM(s) Oral two times a day  metoprolol tartrate IVPB 5 milliGRAM(s) IV Intermittent every 6 hours  pantoprazole  Injectable 40 milliGRAM(s) IV Push daily  sodium chloride 0.9%. 1000 milliLiter(s) (125 mL/Hr) IV Continuous <Continuous>    MEDICATIONS  (PRN):  acetaminophen   IVPB .. 1000 milliGRAM(s) IV Intermittent once PRN Mild Pain (1 - 3)  morphine  - Injectable 2 milliGRAM(s) IV Push every 4 hours PRN Severe Pain (7 - 10)    Vital Signs Last 24 Hrs  T(C): 36.6 (17 Sep 2022 05:00), Max: 36.6 (16 Sep 2022 21:51)  T(F): 97.9 (17 Sep 2022 05:00), Max: 97.9 (16 Sep 2022 21:51)  HR: 98 (17 Sep 2022 05:00) (75 - 98)  BP: 143/96 (17 Sep 2022 05:00) (126/85 - 148/81)  BP(mean): 98 (16 Sep 2022 21:51) (98 - 98)  RR: 17 (17 Sep 2022 05:00) (16 - 18)  SpO2: 95% (17 Sep 2022 05:00) (95% - 100%)    Parameters below as of 17 Sep 2022 05:00  Patient On (Oxygen Delivery Method): room air    PHYSICAL EXAM   GENERAL: NAD, AOx3, well developed  HEAD: Atraumatic, normocephalic  EYES: EOMI, PERRLA, conjunctiva and sclera clear  ENT: moist mucous membrane  NECK: supple, No JVD, midline trachea  CHEST/LUNG: unlabored respirations b/l  Heart: S1, S2 normal  ABDOMEN: soft, nondistended, mildly tender to palpation. well healed surgical sites. NG tube on LWS  EXTREMITIES: +2 peripheral pulses, brisk cap refill. no clubbing, cyanosis or edema  NERVOUS SYSTEM: AOx3, speech clear, no neuro-deficits  MSK: full ROM, no deformities  SKIN: warm to touch, no rash or lesions      I&O's Detail    16 Sep 2022 07:01  -  17 Sep 2022 07:00  --------------------------------------------------------  IN:    IV PiggyBack: 800 mL    sodium chloride 0.9%: 400 mL    sodium chloride 0.9% w/ Additives: 2075 mL  Total IN: 3275 mL    OUT:    Nasogastric/Oral tube (mL): 350 mL    Voided (mL): 500 mL  Total OUT: 850 mL    Total NET: 2425 mL    LABS:                        13.1   3.42  )-----------( 215      ( 16 Sep 2022 08:58 )             40.6     16 Sep 2022 14:21    140    |  106    |  17     ----------------------------<  96     3.4     |  28     |  0.79     Ca    8.4        16 Sep 2022 14:21  Phos  2.1       16 Sep 2022 08:58  Mg     1.7       16 Sep 2022 08:58      PT/INR - ( 15 Sep 2022 10:58 )   PT: 23.4 sec;   INR: 2.00 ratio         PTT - ( 15 Sep 2022 10:58 )  PTT:39.5 sec

## 2022-09-17 NOTE — PROGRESS NOTE ADULT - SUBJECTIVE AND OBJECTIVE BOX
HOSPITALIST ATTENDING PROGRESS NOTE    Chart and meds reviewed.  Patient seen and examined.    CC: SBO    Subjective: Still with NGT. Some nausea but pain improved. Afebrile     All other systems reviewed and found to be negative with the exception of what has been described above.    MEDICATIONS  (STANDING):  atorvastatin 40 milliGRAM(s) Oral at bedtime  ciprofloxacin   IVPB      ciprofloxacin   IVPB 400 milliGRAM(s) IV Intermittent every 12 hours  heparin   Injectable 5000 Unit(s) SubCutaneous every 8 hours  levothyroxine Injectable 12.5 MICROGram(s) IV Push at bedtime  melatonin 5 milliGRAM(s) Oral at bedtime  memantine 10 milliGRAM(s) Oral two times a day  metoprolol tartrate IVPB 5 milliGRAM(s) IV Intermittent every 6 hours  metroNIDAZOLE  IVPB 500 milliGRAM(s) IV Intermittent every 8 hours  pantoprazole  Injectable 40 milliGRAM(s) IV Push daily  sodium chloride 0.9% 1000 milliLiter(s) (125 mL/Hr) IV Continuous <Continuous>    MEDICATIONS  (PRN):  acetaminophen   IVPB .. 1000 milliGRAM(s) IV Intermittent once PRN Mild Pain (1 - 3)  morphine  - Injectable 2 milliGRAM(s) IV Push every 4 hours PRN Severe Pain (7 - 10)  prochlorperazine   Injectable 10 milliGRAM(s) IV Push every 6 hours PRN nausea      VITALS:  T(F): 98.1 (09-17-22 @ 07:58), Max: 98.1 (09-17-22 @ 07:58)  HR: 87 (09-17-22 @ 07:58) (75 - 98)  BP: 123/76 (09-17-22 @ 07:58) (123/76 - 148/81)  RR: 18 (09-17-22 @ 07:58) (16 - 18)  SpO2: 96% (09-17-22 @ 07:58) (95% - 100%)  Wt(kg): --    I&O's Summary    16 Sep 2022 07:01  -  17 Sep 2022 07:00  --------------------------------------------------------  IN: 3275 mL / OUT: 850 mL / NET: 2425 mL        CAPILLARY BLOOD GLUCOSE          PHYSICAL EXAM:  GEN: NAD with NGT  HEENT:  pupils equal and reactive, EOMI, no oropharyngeal lesions, erythema, exudates, oral thrush  NECK:   supple, no carotid bruits, no palpable lymph nodes, no thyromegaly  CV:  +S1, +S2, regular, no murmurs or rubs  RESP:   lungs clear to auscultation bilaterally, no wheezing, rales, rhonchi, good air entry bilaterally  BREAST:  not examined  GI:  abdomen soft, non-tender, non-distended, normal BS, no bruits, no abdominal masses, no palpable masses  RECTAL:  not examined  :  not examined  MSK:   normal muscle tone, no atrophy, no rigidity, no contractions  EXT:  no clubbing, no cyanosis, no edema, no calf pain, swelling or erythema  VASCULAR:  pulses equal and symmetric in the upper and lower extremities  NEURO:  AAOX3, no focal neurological deficits, follows all commands, able to move extremities spontaneously  SKIN:  no ulcers, lesions or rashes    LABS:                            11.2   5.44  )-----------( 211      ( 17 Sep 2022 08:21 )             34.6     09-17    143  |  113<H>  |  16  ----------------------------<  103<H>  3.9   |  24  |  0.57    Ca    7.6<L>      17 Sep 2022 08:21  Phos  1.3     09-17  Mg     1.8     09-17    TPro  6.9  /  Alb  3.3  /  TBili  1.0  /  DBili  x   /  AST  9<L>  /  ALT  12  /  AlkPhos  97  09-15        LIVER FUNCTIONS - ( 15 Sep 2022 11:52 )  Alb: 3.3 g/dL / Pro: 6.9 gm/dL / ALK PHOS: 97 U/L / ALT: 12 U/L / AST: 9 U/L / GGT: x           PT/INR - ( 17 Sep 2022 08:21 )   PT: 14.0 sec;   INR: 1.20 ratio     PTT - ( 17 Sep 2022 08:21 )  PTT:31.0 sec

## 2022-09-18 LAB
ANION GAP SERPL CALC-SCNC: 4 MMOL/L — LOW (ref 5–17)
BUN SERPL-MCNC: 15 MG/DL — SIGNIFICANT CHANGE UP (ref 7–23)
CALCIUM SERPL-MCNC: 7.6 MG/DL — LOW (ref 8.5–10.1)
CHLORIDE SERPL-SCNC: 111 MMOL/L — HIGH (ref 96–108)
CO2 SERPL-SCNC: 23 MMOL/L — SIGNIFICANT CHANGE UP (ref 22–31)
CREAT SERPL-MCNC: 0.64 MG/DL — SIGNIFICANT CHANGE UP (ref 0.5–1.3)
EGFR: 90 ML/MIN/1.73M2 — SIGNIFICANT CHANGE UP
GLUCOSE SERPL-MCNC: 101 MG/DL — HIGH (ref 70–99)
HCT VFR BLD CALC: 36.5 % — SIGNIFICANT CHANGE UP (ref 34.5–45)
HGB BLD-MCNC: 12 G/DL — SIGNIFICANT CHANGE UP (ref 11.5–15.5)
MAGNESIUM SERPL-MCNC: 2 MG/DL — SIGNIFICANT CHANGE UP (ref 1.6–2.6)
MCHC RBC-ENTMCNC: 30 PG — SIGNIFICANT CHANGE UP (ref 27–34)
MCHC RBC-ENTMCNC: 32.9 GM/DL — SIGNIFICANT CHANGE UP (ref 32–36)
MCV RBC AUTO: 91.3 FL — SIGNIFICANT CHANGE UP (ref 80–100)
PHOSPHATE SERPL-MCNC: 1.5 MG/DL — LOW (ref 2.5–4.5)
PLATELET # BLD AUTO: 174 K/UL — SIGNIFICANT CHANGE UP (ref 150–400)
POTASSIUM SERPL-MCNC: 4.7 MMOL/L — SIGNIFICANT CHANGE UP (ref 3.5–5.3)
POTASSIUM SERPL-SCNC: 4.7 MMOL/L — SIGNIFICANT CHANGE UP (ref 3.5–5.3)
RBC # BLD: 4 M/UL — SIGNIFICANT CHANGE UP (ref 3.8–5.2)
RBC # FLD: 16.1 % — HIGH (ref 10.3–14.5)
SODIUM SERPL-SCNC: 138 MMOL/L — SIGNIFICANT CHANGE UP (ref 135–145)
WBC # BLD: 5.71 K/UL — SIGNIFICANT CHANGE UP (ref 3.8–10.5)
WBC # FLD AUTO: 5.71 K/UL — SIGNIFICANT CHANGE UP (ref 3.8–10.5)

## 2022-09-18 PROCEDURE — 99233 SBSQ HOSP IP/OBS HIGH 50: CPT

## 2022-09-18 PROCEDURE — 74019 RADEX ABDOMEN 2 VIEWS: CPT | Mod: 26

## 2022-09-18 RX ORDER — SODIUM CHLORIDE 9 MG/ML
3 INJECTION INTRAMUSCULAR; INTRAVENOUS; SUBCUTANEOUS EVERY 8 HOURS
Refills: 0 | Status: DISCONTINUED | OUTPATIENT
Start: 2022-09-18 | End: 2022-09-20

## 2022-09-18 RX ORDER — KETOROLAC TROMETHAMINE 30 MG/ML
15 SYRINGE (ML) INJECTION EVERY 6 HOURS
Refills: 0 | Status: DISCONTINUED | OUTPATIENT
Start: 2022-09-18 | End: 2022-09-20

## 2022-09-18 RX ORDER — ACETAMINOPHEN 500 MG
1000 TABLET ORAL ONCE
Refills: 0 | Status: COMPLETED | OUTPATIENT
Start: 2022-09-18 | End: 2022-09-18

## 2022-09-18 RX ADMIN — Medication 200 MILLIGRAM(S): at 05:07

## 2022-09-18 RX ADMIN — MEMANTINE HYDROCHLORIDE 10 MILLIGRAM(S): 10 TABLET ORAL at 11:43

## 2022-09-18 RX ADMIN — SODIUM CHLORIDE 125 MILLILITER(S): 9 INJECTION, SOLUTION INTRAVENOUS at 11:52

## 2022-09-18 RX ADMIN — Medication 1000 MILLIGRAM(S): at 01:25

## 2022-09-18 RX ADMIN — Medication 110 MILLIGRAM(S): at 17:51

## 2022-09-18 RX ADMIN — SODIUM CHLORIDE 3 MILLILITER(S): 9 INJECTION INTRAMUSCULAR; INTRAVENOUS; SUBCUTANEOUS at 21:36

## 2022-09-18 RX ADMIN — Medication 15 MILLIGRAM(S): at 06:29

## 2022-09-18 RX ADMIN — SODIUM CHLORIDE 3 MILLILITER(S): 9 INJECTION INTRAMUSCULAR; INTRAVENOUS; SUBCUTANEOUS at 15:46

## 2022-09-18 RX ADMIN — Medication 200 MILLIGRAM(S): at 17:51

## 2022-09-18 RX ADMIN — Medication 100 MILLIGRAM(S): at 05:08

## 2022-09-18 RX ADMIN — Medication 110 MILLIGRAM(S): at 05:08

## 2022-09-18 RX ADMIN — MEMANTINE HYDROCHLORIDE 10 MILLIGRAM(S): 10 TABLET ORAL at 21:39

## 2022-09-18 RX ADMIN — Medication 62.5 MILLIMOLE(S): at 11:43

## 2022-09-18 RX ADMIN — Medication 5 MILLIGRAM(S): at 21:39

## 2022-09-18 RX ADMIN — HEPARIN SODIUM 5000 UNIT(S): 5000 INJECTION INTRAVENOUS; SUBCUTANEOUS at 05:07

## 2022-09-18 RX ADMIN — Medication 400 MILLIGRAM(S): at 01:10

## 2022-09-18 RX ADMIN — PANTOPRAZOLE SODIUM 40 MILLIGRAM(S): 20 TABLET, DELAYED RELEASE ORAL at 11:42

## 2022-09-18 RX ADMIN — Medication 15 MILLIGRAM(S): at 06:07

## 2022-09-18 RX ADMIN — Medication 12.5 MICROGRAM(S): at 21:40

## 2022-09-18 RX ADMIN — Medication 110 MILLIGRAM(S): at 11:43

## 2022-09-18 RX ADMIN — Medication 100 MILLIGRAM(S): at 14:50

## 2022-09-18 RX ADMIN — ATORVASTATIN CALCIUM 40 MILLIGRAM(S): 80 TABLET, FILM COATED ORAL at 21:39

## 2022-09-18 RX ADMIN — Medication 100 MILLIGRAM(S): at 21:40

## 2022-09-18 RX ADMIN — HEPARIN SODIUM 5000 UNIT(S): 5000 INJECTION INTRAVENOUS; SUBCUTANEOUS at 14:45

## 2022-09-18 RX ADMIN — HEPARIN SODIUM 5000 UNIT(S): 5000 INJECTION INTRAVENOUS; SUBCUTANEOUS at 21:39

## 2022-09-18 RX ADMIN — Medication 110 MILLIGRAM(S): at 23:18

## 2022-09-18 NOTE — PROGRESS NOTE ADULT - ASSESSMENT
76 y/o female with history of mild dementia, ulcerative colitis s/p total colectomy in 1996, Paroxysmal atrial fibrillation, HTN, Hypothyroid, Anxiety/depression, OA s/p recent knee replacement 4 weeks ago who is admitted to surgical service with high grade sbo.    # High grade SBO:  -NPO  -IVF  -DC NGT  - Continue Cipro/Flagyl as per surgery  -compazine prn  -for small bowel series per surgical team.  - ? repeat CT scan    # Hypophosphatemia  - replace    # Hypocalcemia  - check albumin    # Paroxysmal atrial fibrillation:  - IV lopressor for now  - Rate controlled  -resume multaq when able to take pills  -eliquis on hold     # HTN:  -on IV Lopressor    # Hypothyroid:  - IV synthroid until able to take oral medication    # Ulcerative colitis:  -resume mercaptopurine/mesalamine once sbo improved    # Dementia.   -resume namenda once sbo improved.     # DVT px:  -on hep sq for now

## 2022-09-18 NOTE — PROGRESS NOTE ADULT - SUBJECTIVE AND OBJECTIVE BOX
HOSPITALIST ATTENDING PROGRESS NOTE    Chart and meds reviewed.  Patient seen and examined.    CC: SBO    Subjective: Currently without NGT. Still with nausea and abdominal distension. Minimal po intake.    All other systems reviewed and found to be negative with the exception of what has been described above.    MEDICATIONS  (STANDING):  atorvastatin 40 milliGRAM(s) Oral at bedtime  ciprofloxacin   IVPB      ciprofloxacin   IVPB 400 milliGRAM(s) IV Intermittent every 12 hours  heparin   Injectable 5000 Unit(s) SubCutaneous every 8 hours  levothyroxine Injectable 12.5 MICROGram(s) IV Push at bedtime  melatonin 5 milliGRAM(s) Oral at bedtime  memantine 10 milliGRAM(s) Oral two times a day  metoprolol tartrate IVPB 5 milliGRAM(s) IV Intermittent every 6 hours  metroNIDAZOLE  IVPB 500 milliGRAM(s) IV Intermittent every 8 hours  pantoprazole  Injectable 40 milliGRAM(s) IV Push daily  sodium chloride 0.9% 1000 milliLiter(s) (125 mL/Hr) IV Continuous <Continuous>    MEDICATIONS  (PRN):  acetaminophen   IVPB .. 1000 milliGRAM(s) IV Intermittent once PRN Mild Pain (1 - 3)  aluminum hydroxide/magnesium hydroxide/simethicone Suspension 30 milliLiter(s) Oral every 4 hours PRN Dyspepsia  ketorolac   Injectable 15 milliGRAM(s) IV Push every 6 hours PRN Moderate Pain (4 - 6)  prochlorperazine   Injectable 10 milliGRAM(s) IV Push every 6 hours PRN nausea      VITALS:  T(F): 97.2 (09-18-22 @ 07:57), Max: 98.5 (09-17-22 @ 22:41)  HR: 106 (09-18-22 @ 07:57) (85 - 107)  BP: 137/81 (09-18-22 @ 07:57) (102/59 - 139/96)  RR: 18 (09-18-22 @ 07:57) (18 - 18)  SpO2: 95% (09-18-22 @ 07:57) (94% - 98%)  Wt(kg): --    I&O's Summary    17 Sep 2022 07:01  -  18 Sep 2022 07:00  --------------------------------------------------------  IN: 4165 mL / OUT: 50 mL / NET: 4115 mL        CAPILLARY BLOOD GLUCOSE          PHYSICAL EXAM:  GEN: In some discomfort  HEENT:  pupils equal and reactive, EOMI, no oropharyngeal lesions, erythema, exudates, oral thrush  NECK:   supple, no carotid bruits, no palpable lymph nodes, no thyromegaly  CV:  +S1, +S2, regular, no murmurs or rubs  RESP:   lungs clear to auscultation bilaterally, no wheezing, rales, rhonchi, good air entry bilaterally  BREAST:  not examined  GI:  abdomen soft,+ Distension, No rebound or guarding  RECTAL:  not examined  :  not examined  MSK:   normal muscle tone, no atrophy, no rigidity, no contractions  EXT:  no clubbing, no cyanosis, no edema, no calf pain, swelling or erythema  VASCULAR:  pulses equal and symmetric in the upper and lower extremities  NEURO:  AAOX3, no focal neurological deficits, follows all commands, able to move extremities spontaneously  SKIN:  no ulcers, lesions or rashes    LABS:                            12.0   5.71  )-----------( 174      ( 18 Sep 2022 09:04 )             36.5     09-18    138  |  111<H>  |  15  ----------------------------<  101<H>  4.7   |  23  |  0.64    Ca    7.6<L>      18 Sep 2022 09:04  Phos  1.5     09-18  Mg     2.0     09-18      PT/INR - ( 17 Sep 2022 08:21 )   PT: 14.0 sec;   INR: 1.20 ratio     PTT - ( 17 Sep 2022 08:21 )  PTT:31.0 sec

## 2022-09-18 NOTE — PROGRESS NOTE ADULT - ASSESSMENT
76 yo female with SBO, unlikely to be closed loop but high grade obstruction  lactic acid/wbc normal. tolerated clears without vomiting, but still distended.     Plan:   monitor vitals signs  monitor BF  continue CLD  serial abd exams  continue IV fluid  DVT/GI PPX  continue conservative management    Plan discussed with Dr Crowley

## 2022-09-18 NOTE — PROGRESS NOTE ADULT - SUBJECTIVE AND OBJECTIVE BOX
SURGERY DAILY PROGRESS NOTE:     Subjective:  Patient seen and examined this AM at bedside. No acute events overnight. Patient has not passed flatus since yesterday morning, but had bowel movement last night. Still complains of cramping pain but denies n/v. Denies fever/chills, shortness of breath, chest pain. VS reviewed    Objective:    MEDICATIONS  (STANDING):  atorvastatin 40 milliGRAM(s) Oral at bedtime  ciprofloxacin   IVPB      ciprofloxacin   IVPB 400 milliGRAM(s) IV Intermittent every 12 hours  heparin   Injectable 5000 Unit(s) SubCutaneous every 8 hours  levothyroxine Injectable 12.5 MICROGram(s) IV Push at bedtime  melatonin 5 milliGRAM(s) Oral at bedtime  memantine 10 milliGRAM(s) Oral two times a day  metoprolol tartrate IVPB 5 milliGRAM(s) IV Intermittent every 6 hours  metroNIDAZOLE  IVPB 500 milliGRAM(s) IV Intermittent every 8 hours  pantoprazole  Injectable 40 milliGRAM(s) IV Push daily  sodium chloride 0.9% 1000 milliLiter(s) (125 mL/Hr) IV Continuous <Continuous>  sodium phosphate IVPB 15 milliMole(s) IV Intermittent once    MEDICATIONS  (PRN):  acetaminophen   IVPB .. 1000 milliGRAM(s) IV Intermittent once PRN Mild Pain (1 - 3)  aluminum hydroxide/magnesium hydroxide/simethicone Suspension 30 milliLiter(s) Oral every 4 hours PRN Dyspepsia  ketorolac   Injectable 15 milliGRAM(s) IV Push every 6 hours PRN Moderate Pain (4 - 6)  prochlorperazine   Injectable 10 milliGRAM(s) IV Push every 6 hours PRN nausea      Vital Signs Last 24 Hrs  T(C): 36.2 (18 Sep 2022 07:57), Max: 36.9 (17 Sep 2022 22:41)  T(F): 97.2 (18 Sep 2022 07:57), Max: 98.5 (17 Sep 2022 22:41)  HR: 106 (18 Sep 2022 07:57) (85 - 107)  BP: 137/81 (18 Sep 2022 07:57) (102/59 - 139/96)  BP(mean): --  RR: 18 (18 Sep 2022 07:57) (18 - 18)  SpO2: 95% (18 Sep 2022 07:57) (94% - 98%)    Parameters below as of 18 Sep 2022 07:57  Patient On (Oxygen Delivery Method): room air          PHYSICAL EXAM   GENERAL: NAD, AOx3, well developed  HEAD: Atraumatic, normocephalic  EYES: EOMI, PERRLA, conjunctiva and sclera clear  ENT: moist mucous membrane  NECK: supple, No JVD, midline trachea  CHEST/LUNG: unlabored respirations b/l  Heart: S1, S2 normal  ABDOMEN: soft, distended, mildly tender to palpation. well healed surgical sites.  EXTREMITIES: +2 peripheral pulses, brisk cap refill. no clubbing, cyanosis or edema  NERVOUS SYSTEM: AOx3, speech clear, no neuro-deficits  MSK: full ROM, no deformities  SKIN: warm to touch, no rash or lesions      I&O's Detail    17 Sep 2022 07:01  -  18 Sep 2022 07:00  --------------------------------------------------------  IN:    IV PiggyBack: 1450 mL    Oral Fluid: 340 mL    sodium chloride 0.9% w/ Additives: 2375 mL  Total IN: 4165 mL    OUT:    Nasogastric/Oral tube (mL): 50 mL  Total OUT: 50 mL    Total NET: 4115 mL          Daily     Daily     LABS:                        12.0   5.71  )-----------( 174      ( 18 Sep 2022 09:04 )             36.5     09-18    138  |  111<H>  |  15  ----------------------------<  101<H>  4.7   |  23  |  0.64    Ca    7.6<L>      18 Sep 2022 09:04  Phos  1.5     09-18  Mg     2.0     09-18      PT/INR - ( 17 Sep 2022 08:21 )   PT: 14.0 sec;   INR: 1.20 ratio         PTT - ( 17 Sep 2022 08:21 )  PTT:31.0 sec      RADIOLOGY & ADDITIONAL STUDIES:

## 2022-09-18 NOTE — PROGRESS NOTE ADULT - ATTENDING COMMENTS
Patient is resting in bed comfortably, having flatus & BM's, patient does c/o abdominal bloating & intermitting cramping.       Exam  Gen:NAD, AAOx3  Abd: Soft, mild to moderate distention, NT    Plan  - AXR today  - If vomiting NGT & NPO  - Serial abdominal exams  - GI ppx  - PT consult  - Ambulate  - DVT ppx

## 2022-09-19 ENCOUNTER — TRANSCRIPTION ENCOUNTER (OUTPATIENT)
Age: 77
End: 2022-09-19

## 2022-09-19 LAB
ANION GAP SERPL CALC-SCNC: 6 MMOL/L — SIGNIFICANT CHANGE UP (ref 5–17)
BUN SERPL-MCNC: 12 MG/DL — SIGNIFICANT CHANGE UP (ref 7–23)
CALCIUM SERPL-MCNC: 8 MG/DL — LOW (ref 8.5–10.1)
CHLORIDE SERPL-SCNC: 107 MMOL/L — SIGNIFICANT CHANGE UP (ref 96–108)
CO2 SERPL-SCNC: 25 MMOL/L — SIGNIFICANT CHANGE UP (ref 22–31)
CREAT SERPL-MCNC: 0.62 MG/DL — SIGNIFICANT CHANGE UP (ref 0.5–1.3)
EGFR: 91 ML/MIN/1.73M2 — SIGNIFICANT CHANGE UP
GLUCOSE SERPL-MCNC: 94 MG/DL — SIGNIFICANT CHANGE UP (ref 70–99)
HCT VFR BLD CALC: 36.4 % — SIGNIFICANT CHANGE UP (ref 34.5–45)
HGB BLD-MCNC: 11.9 G/DL — SIGNIFICANT CHANGE UP (ref 11.5–15.5)
MAGNESIUM SERPL-MCNC: 1.7 MG/DL — SIGNIFICANT CHANGE UP (ref 1.6–2.6)
MCHC RBC-ENTMCNC: 30.3 PG — SIGNIFICANT CHANGE UP (ref 27–34)
MCHC RBC-ENTMCNC: 32.7 GM/DL — SIGNIFICANT CHANGE UP (ref 32–36)
MCV RBC AUTO: 92.6 FL — SIGNIFICANT CHANGE UP (ref 80–100)
PHOSPHATE SERPL-MCNC: 1.5 MG/DL — LOW (ref 2.5–4.5)
PLATELET # BLD AUTO: 211 K/UL — SIGNIFICANT CHANGE UP (ref 150–400)
POTASSIUM SERPL-MCNC: 3.6 MMOL/L — SIGNIFICANT CHANGE UP (ref 3.5–5.3)
POTASSIUM SERPL-SCNC: 3.6 MMOL/L — SIGNIFICANT CHANGE UP (ref 3.5–5.3)
RBC # BLD: 3.93 M/UL — SIGNIFICANT CHANGE UP (ref 3.8–5.2)
RBC # FLD: 16.3 % — HIGH (ref 10.3–14.5)
SODIUM SERPL-SCNC: 138 MMOL/L — SIGNIFICANT CHANGE UP (ref 135–145)
WBC # BLD: 8.18 K/UL — SIGNIFICANT CHANGE UP (ref 3.8–10.5)
WBC # FLD AUTO: 8.18 K/UL — SIGNIFICANT CHANGE UP (ref 3.8–10.5)

## 2022-09-19 PROCEDURE — 99233 SBSQ HOSP IP/OBS HIGH 50: CPT

## 2022-09-19 PROCEDURE — 99222 1ST HOSP IP/OBS MODERATE 55: CPT

## 2022-09-19 RX ORDER — MERCAPTOPURINE 50 MG/1
50 TABLET ORAL DAILY
Refills: 0 | Status: DISCONTINUED | OUTPATIENT
Start: 2022-09-19 | End: 2022-09-20

## 2022-09-19 RX ORDER — LEVOTHYROXINE SODIUM 125 MCG
1 TABLET ORAL
Qty: 0 | Refills: 0 | DISCHARGE
Start: 2022-09-19

## 2022-09-19 RX ORDER — LEVOTHYROXINE SODIUM 125 MCG
1 TABLET ORAL
Qty: 0 | Refills: 0 | DISCHARGE

## 2022-09-19 RX ORDER — LEVOTHYROXINE SODIUM 125 MCG
25 TABLET ORAL DAILY
Refills: 0 | Status: DISCONTINUED | OUTPATIENT
Start: 2022-09-19 | End: 2022-09-20

## 2022-09-19 RX ORDER — MESALAMINE 400 MG
1000 TABLET, DELAYED RELEASE (ENTERIC COATED) ORAL
Refills: 0 | Status: DISCONTINUED | OUTPATIENT
Start: 2022-09-19 | End: 2022-09-20

## 2022-09-19 RX ORDER — METOPROLOL TARTRATE 50 MG
50 TABLET ORAL DAILY
Refills: 0 | Status: DISCONTINUED | OUTPATIENT
Start: 2022-09-19 | End: 2022-09-20

## 2022-09-19 RX ORDER — POTASSIUM PHOSPHATE, MONOBASIC POTASSIUM PHOSPHATE, DIBASIC 236; 224 MG/ML; MG/ML
30 INJECTION, SOLUTION INTRAVENOUS ONCE
Refills: 0 | Status: COMPLETED | OUTPATIENT
Start: 2022-09-19 | End: 2022-09-19

## 2022-09-19 RX ORDER — MEMANTINE HYDROCHLORIDE 10 MG/1
1 TABLET ORAL
Qty: 0 | Refills: 0 | DISCHARGE
Start: 2022-09-19

## 2022-09-19 RX ORDER — DRONEDARONE 400 MG/1
200 TABLET, FILM COATED ORAL
Refills: 0 | Status: DISCONTINUED | OUTPATIENT
Start: 2022-09-19 | End: 2022-09-20

## 2022-09-19 RX ORDER — SENNA PLUS 8.6 MG/1
1 TABLET ORAL
Qty: 60 | Refills: 0
Start: 2022-09-19

## 2022-09-19 RX ORDER — MEMANTINE HYDROCHLORIDE 10 MG/1
1 TABLET ORAL
Qty: 0 | Refills: 0 | DISCHARGE

## 2022-09-19 RX ORDER — DULOXETINE HYDROCHLORIDE 30 MG/1
60 CAPSULE, DELAYED RELEASE ORAL DAILY
Refills: 0 | Status: DISCONTINUED | OUTPATIENT
Start: 2022-09-19 | End: 2022-09-20

## 2022-09-19 RX ORDER — APIXABAN 2.5 MG/1
5 TABLET, FILM COATED ORAL
Refills: 0 | Status: DISCONTINUED | OUTPATIENT
Start: 2022-09-19 | End: 2022-09-20

## 2022-09-19 RX ADMIN — HEPARIN SODIUM 5000 UNIT(S): 5000 INJECTION INTRAVENOUS; SUBCUTANEOUS at 05:29

## 2022-09-19 RX ADMIN — MERCAPTOPURINE 50 MILLIGRAM(S): 50 TABLET ORAL at 17:44

## 2022-09-19 RX ADMIN — Medication 200 MILLIGRAM(S): at 05:29

## 2022-09-19 RX ADMIN — SODIUM CHLORIDE 3 MILLILITER(S): 9 INJECTION INTRAMUSCULAR; INTRAVENOUS; SUBCUTANEOUS at 21:02

## 2022-09-19 RX ADMIN — Medication 100 MILLIGRAM(S): at 21:57

## 2022-09-19 RX ADMIN — Medication 100 MILLIGRAM(S): at 14:49

## 2022-09-19 RX ADMIN — MEMANTINE HYDROCHLORIDE 10 MILLIGRAM(S): 10 TABLET ORAL at 21:58

## 2022-09-19 RX ADMIN — ATORVASTATIN CALCIUM 40 MILLIGRAM(S): 80 TABLET, FILM COATED ORAL at 21:58

## 2022-09-19 RX ADMIN — SODIUM CHLORIDE 3 MILLILITER(S): 9 INJECTION INTRAMUSCULAR; INTRAVENOUS; SUBCUTANEOUS at 14:05

## 2022-09-19 RX ADMIN — DRONEDARONE 200 MILLIGRAM(S): 400 TABLET, FILM COATED ORAL at 21:58

## 2022-09-19 RX ADMIN — Medication 5 MILLIGRAM(S): at 21:58

## 2022-09-19 RX ADMIN — APIXABAN 5 MILLIGRAM(S): 2.5 TABLET, FILM COATED ORAL at 21:58

## 2022-09-19 RX ADMIN — Medication 200 MILLIGRAM(S): at 17:45

## 2022-09-19 RX ADMIN — MEMANTINE HYDROCHLORIDE 10 MILLIGRAM(S): 10 TABLET ORAL at 10:00

## 2022-09-19 RX ADMIN — Medication 100 MILLIGRAM(S): at 04:15

## 2022-09-19 RX ADMIN — SODIUM CHLORIDE 3 MILLILITER(S): 9 INJECTION INTRAMUSCULAR; INTRAVENOUS; SUBCUTANEOUS at 04:13

## 2022-09-19 RX ADMIN — Medication 50 MILLIGRAM(S): at 17:49

## 2022-09-19 RX ADMIN — POTASSIUM PHOSPHATE, MONOBASIC POTASSIUM PHOSPHATE, DIBASIC 83.33 MILLIMOLE(S): 236; 224 INJECTION, SOLUTION INTRAVENOUS at 14:49

## 2022-09-19 RX ADMIN — PANTOPRAZOLE SODIUM 40 MILLIGRAM(S): 20 TABLET, DELAYED RELEASE ORAL at 09:59

## 2022-09-19 RX ADMIN — DULOXETINE HYDROCHLORIDE 60 MILLIGRAM(S): 30 CAPSULE, DELAYED RELEASE ORAL at 17:45

## 2022-09-19 NOTE — PROGRESS NOTE ADULT - SUBJECTIVE AND OBJECTIVE BOX
HOSPITALIST ATTENDING PROGRESS NOTE    Chart and meds reviewed.  Patient seen and examined.    CC: SBO    Subjective: Patient feels better today. Abdomen less distended. Tolerating liquids. No vomiting.     All other systems reviewed and found to be negative with the exception of what has been described above.    MEDICATIONS  (STANDING):  apixaban 5 milliGRAM(s) Oral two times a day  atorvastatin 40 milliGRAM(s) Oral at bedtime  ciprofloxacin   IVPB      ciprofloxacin   IVPB 400 milliGRAM(s) IV Intermittent every 12 hours  dronedarone 200 milliGRAM(s) Oral two times a day  levothyroxine 25 MICROGram(s) Oral daily  melatonin 5 milliGRAM(s) Oral at bedtime  memantine 10 milliGRAM(s) Oral two times a day  mercaptopurine 50 milliGRAM(s) Oral daily  mesalamine Suppository 1000 milliGRAM(s) Rectal <User Schedule>  metoprolol succinate ER 50 milliGRAM(s) Oral daily  metroNIDAZOLE  IVPB 500 milliGRAM(s) IV Intermittent every 8 hours  pantoprazole  Injectable 40 milliGRAM(s) IV Push daily  sodium chloride 0.9% lock flush 3 milliLiter(s) IV Push every 8 hours    MEDICATIONS  (PRN):  acetaminophen   IVPB .. 1000 milliGRAM(s) IV Intermittent once PRN Mild Pain (1 - 3)  aluminum hydroxide/magnesium hydroxide/simethicone Suspension 30 milliLiter(s) Oral every 4 hours PRN Dyspepsia  ketorolac   Injectable 15 milliGRAM(s) IV Push every 6 hours PRN Moderate Pain (4 - 6)  prochlorperazine   Injectable 10 milliGRAM(s) IV Push every 6 hours PRN nausea      VITALS:  T(F): 97.8 (09-19-22 @ 08:42), Max: 98.2 (09-19-22 @ 05:17)  HR: 103 (09-19-22 @ 08:42) (95 - 103)  BP: 106/73 (09-19-22 @ 08:42) (106/73 - 136/74)  RR: 17 (09-19-22 @ 08:42) (17 - 18)  SpO2: 95% (09-19-22 @ 08:42) (94% - 95%)  Wt(kg): --    I&O's Summary    18 Sep 2022 07:01  -  19 Sep 2022 07:00  --------------------------------------------------------  IN: 400 mL / OUT: 0 mL / NET: 400 mL        CAPILLARY BLOOD GLUCOSE    PHYSICAL EXAM:  GEN: Frail  HEENT:  pupils equal and reactive, EOMI, no oropharyngeal lesions, erythema, exudates, oral thrush  NECK:   supple, no carotid bruits, no palpable lymph nodes, no thyromegaly  CV:  +S1, +S2, regular, no murmurs or rubs  RESP:   lungs clear to auscultation bilaterally, no wheezing, rales, rhonchi, good air entry bilaterally  BREAST:  not examined  GI:  abdomen soft, slight distension, no rebound or guarding.   RECTAL:  not examined  :  not examined  MSK:   normal muscle tone, no atrophy, no rigidity, no contractions  EXT:  no clubbing, no cyanosis, no edema, no calf pain, swelling or erythema  VASCULAR:  pulses equal and symmetric in the upper and lower extremities  NEURO:  AAOX3, no focal neurological deficits, follows all commands, able to move extremities spontaneously  SKIN:  no ulcers, lesions or rashes    LABS:                            11.9   8.18  )-----------( 211      ( 19 Sep 2022 08:05 )             36.4     09-19    138  |  107  |  12  ----------------------------<  94  3.6   |  25  |  0.62    Ca    8.0<L>      19 Sep 2022 08:05  Phos  1.5     09-19  Mg     1.7     09-19    TPro  5.6<L>  /  Alb  2.8<L>  /  TBili  0.7  /  DBili  x   /  AST  19  /  ALT  13  /  AlkPhos  67  09-18        LIVER FUNCTIONS - ( 18 Sep 2022 15:46 )  Alb: 2.8 g/dL / Pro: 5.6 gm/dL / ALK PHOS: 67 U/L / ALT: 13 U/L / AST: 19 U/L / GGT: x           < from: Xray Small Bowel Series (09.16.22 @ 16:01) >    IMPRESSION:    Distended loops of small bowel in the left side of the abdomen consistent   with patient's known history of small bowel obstruction. I cannot   determine if any contrast is passed the point of obstruction due to   overlapping loops of bowel containing contrast.    ---End of Report ---    < end of copied text >  < from: CT Abdomen and Pelvis w/ IV Cont (09.15.22 @ 11:37) >  IMPRESSION:    Findings compatible with high-grade closed-loop small bowel obstruction.    3.5 cm suprarenal mass with internal speckled calcification. The organ of   origin is not definitively. This is likely of adrenal or left renal   origin. Alternatively, this may be related to a neurogenic tumor.   Consider further correlation with adrenal/renal protocol MRI for further   delineation. Comparison to previous exams also be of value to establish   stability, if available elsewhere.    Question of previous left renal vein thrombosis.        < end of copied text >

## 2022-09-19 NOTE — PROGRESS NOTE ADULT - ASSESSMENT
78 y/o female with history of mild dementia, ulcerative colitis s/p total colectomy in 1996, Paroxysmal atrial fibrillation, HTN, Hypothyroid, Anxiety/depression, OA s/p recent knee replacement 4 weeks ago who is admitted to surgical service with high grade sbo.    # High grade SBO:  - NGT DC  - IVF DC  - Continue Cipro/Flagyl as per surgery  -compazine prn  - Improving clinically    # Suprarenal mass  - 3.5 cm with internal speckled calcification  - Surgery following  - Recommend Adrenal Protocol MRI    # Hypophosphatemia  - replace    # Paroxysmal atrial fibrillation:  - Restart Metoprolol  - Restart Multaq   - Restart Eliquis  - Rate controlled    # HTN:  -on metoprolol    # Hypothyroid:  - Levothyroxine    # Ulcerative colitis:  - mercaptopurine/mesalamine    # Dementia.   - Namenda    # DVT px:  - Eliquis

## 2022-09-19 NOTE — CHART NOTE - NSCHARTNOTEFT_GEN_A_CORE
Patient met with for supportive counseling. Patient has outpatient treatment in the community.
Patient and  met with for supportive counseling.

## 2022-09-19 NOTE — PROGRESS NOTE ADULT - SUBJECTIVE AND OBJECTIVE BOX
SURGERY DAILY PROGRESS NOTE:     Subjective:  Patient seen and examined this AM at bedside. No acute events overnight. Having bowel function. Still complains of pain, but improved. Denies fever/chills, shortness of breath, chest pain. VS reviewed    Objective:    MEDICATIONS  (STANDING):  atorvastatin 40 milliGRAM(s) Oral at bedtime  ciprofloxacin   IVPB      ciprofloxacin   IVPB 400 milliGRAM(s) IV Intermittent every 12 hours  heparin   Injectable 5000 Unit(s) SubCutaneous every 8 hours  levothyroxine Injectable 12.5 MICROGram(s) IV Push at bedtime  melatonin 5 milliGRAM(s) Oral at bedtime  memantine 10 milliGRAM(s) Oral two times a day  metoprolol tartrate IVPB 5 milliGRAM(s) IV Intermittent every 6 hours  metroNIDAZOLE  IVPB 500 milliGRAM(s) IV Intermittent every 8 hours  pantoprazole  Injectable 40 milliGRAM(s) IV Push daily  sodium chloride 0.9% 1000 milliLiter(s) (125 mL/Hr) IV Continuous <Continuous>  sodium phosphate IVPB 15 milliMole(s) IV Intermittent once    MEDICATIONS  (PRN):  acetaminophen   IVPB .. 1000 milliGRAM(s) IV Intermittent once PRN Mild Pain (1 - 3)  aluminum hydroxide/magnesium hydroxide/simethicone Suspension 30 milliLiter(s) Oral every 4 hours PRN Dyspepsia  ketorolac   Injectable 15 milliGRAM(s) IV Push every 6 hours PRN Moderate Pain (4 - 6)  prochlorperazine   Injectable 10 milliGRAM(s) IV Push every 6 hours PRN nausea      Vital Signs Last 24 Hrs  T(C): 36.6 (19 Sep 2022 08:42), Max: 36.8 (19 Sep 2022 05:17)  T(F): 97.8 (19 Sep 2022 08:42), Max: 98.2 (19 Sep 2022 05:17)  HR: 103 (19 Sep 2022 08:42) (95 - 103)  BP: 106/73 (19 Sep 2022 08:42) (106/73 - 136/74)  BP(mean): --  RR: 17 (19 Sep 2022 08:42) (17 - 18)  SpO2: 95% (19 Sep 2022 08:42) (94% - 95%)    Parameters below as of 19 Sep 2022 08:42  Patient On (Oxygen Delivery Method): room air    PHYSICAL EXAM   GENERAL: NAD, AOx3, well developed  HEAD: Atraumatic, normocephalic  EYES: EOMI, PERRLA, conjunctiva and sclera clear  ENT: moist mucous membrane  NECK: supple, No JVD, midline trachea  CHEST/LUNG: unlabored respirations b/l  Heart: S1, S2 normal  ABDOMEN: soft, distended, mildly tender to palpation. well healed surgical sites.  EXTREMITIES: +2 peripheral pulses, brisk cap refill. no clubbing, cyanosis or edema  NERVOUS SYSTEM: AOx3, speech clear, no neuro-deficits  MSK: full ROM, no deformities  SKIN: warm to touch, no rash or lesions      I&O's Detail    18 Sep 2022 07:01  -  19 Sep 2022 07:00  --------------------------------------------------------  IN:    IV PiggyBack: 400 mL  Total IN: 400 mL    OUT:  Total OUT: 0 mL    Total NET: 400 mL        Daily     Daily     LABS:                                   11.9   8.18  )-----------( 211      ( 19 Sep 2022 08:05 )             36.4   09-19    138  |  107  |  12  ----------------------------<  94  3.6   |  25  |  0.62    Ca    8.0<L>      19 Sep 2022 08:05  Phos  1.5     09-19  Mg     1.7     09-19    TPro  5.6<L>  /  Alb  2.8<L>  /  TBili  0.7  /  DBili  x   /  AST  19  /  ALT  13  /  AlkPhos  67  09-18        RADIOLOGY & ADDITIONAL STUDIES:

## 2022-09-19 NOTE — DISCHARGE NOTE PROVIDER - HOSPITAL COURSE
78 yo female with abdominal pain for a couple weeks , presents with increasing abdominal pain for the last 3 days. Her pain is in the middle of her abdomen, waxing and waning and associated with nausea, burping and decreased appetite. She has Afib and last took Eliquis today in AM. She had her last BM yesterday. She has a H/o ulcerative colitis S/P total abd colectomy in 1996.     Pt was admitted for SBO, no trasnition point, treated conservatively with gastrografin contrast, bowel rest, IVF, pt had return of bowel function, toelrating now low residue diet, still having bowel funtion. PT eval states she is independent to go home with home PT services.

## 2022-09-19 NOTE — DISCHARGE NOTE PROVIDER - CARE PROVIDER_API CALL
Ashley Diop)  Family Medicine  [address] [place] [address]  Phone: [phone]  Fax: [phone]  Follow Up Time: 1 week

## 2022-09-19 NOTE — DISCHARGE NOTE PROVIDER - NSDCFUSCHEDAPPT_GEN_ALL_CORE_FT
Cally Tony  [facility]  ORTHOSURG [address] [facility]  Scheduled Appointment: 10/11/2022

## 2022-09-19 NOTE — PHYSICAL THERAPY INITIAL EVALUATION ADULT - ADDITIONAL COMMENTS
Pt lives in private home with . Home has no EVELINE and 2 steps inside. Pt has RW at home. Pt was independent prior to surgery. Info obtained from eval 8/2022.

## 2022-09-19 NOTE — PHYSICAL THERAPY INITIAL EVALUATION ADULT - PERTINENT HX OF CURRENT PROBLEM, REHAB EVAL
Pt admitted to  secondary to nausea and abd distention. X-ray small bowel series: left side distended loops small bowel.

## 2022-09-19 NOTE — DISCHARGE NOTE PROVIDER - NSDCCPCAREPLAN_GEN_ALL_CORE_FT
PRINCIPAL DISCHARGE DIAGNOSIS  Diagnosis: SBO (small bowel obstruction)  Assessment and Plan of Treatment:       SECONDARY DISCHARGE DIAGNOSES  Diagnosis: Renal mass  Assessment and Plan of Treatment:     Diagnosis: Dehydration  Assessment and Plan of Treatment:     Diagnosis: Acute renal failure (ARF)  Assessment and Plan of Treatment:

## 2022-09-19 NOTE — PHYSICAL THERAPY INITIAL EVALUATION ADULT - PLANNED THERAPY INTERVENTIONS, PT EVAL
Sydneyal, amb, transfers, bed mobility x 15'. Pt left in bathroom to have BM. Nursing assistant informed of pt OOB in bathroom. Pt instructed to not get up on her own and to utilize CB if she needs to get out of bathroom. Pt c/o abd pain 4/10. Will cont to follow pt and increase as tolerated./bed mobility training/gait training/transfer training

## 2022-09-19 NOTE — CONSULT NOTE ADULT - ASSESSMENT
full note to follow. doing well passing gas tolerating cld   77 year old woman with ulcerative colitis s/p colectomy, admitted with two weeks of intermittent abdominal pain and found to have SBO.     Patient much better with conservative management. NGT is out, tolerating clears.   Advance diet as tolerated. Since having more formed BM's, passing gas. Ok to advance to fulls but defer to surgery.

## 2022-09-19 NOTE — PROGRESS NOTE ADULT - ASSESSMENT
78 yo female with SBO, unlikely to be closed loop but high grade obstruction  lactic acid/wbc normal. With bowel function    Plan:   monitor vitals signs  monitor BF  advance to FLD  serial abd exams  continue IV fluid  DVT/GI PPX  continue conservative management    Plan discussed with Dr Crowley 76 yo female with SBO, unlikely to be closed loop but high grade obstruction  lactic acid/wbc normal. With bowel function    Plan:   monitor vitals signs  monitor BF  advance to FLD, ADAT  serial abd exams  continue IV fluid  DVT/GI PPX  continue conservative management    Plan discussed with Dr Crowley

## 2022-09-19 NOTE — CONSULT NOTE ADULT - SUBJECTIVE AND OBJECTIVE BOX
Patient is a 77y old  Female who presents with a chief complaint of SBO (19 Sep 2022 14:22)      HPI:  76 yo female with abdominal pain for a couple weeks , presents with increasing abdominal pain for the last 3 days. Her pain is in the middle of her abdomen, waxing and waning and associated with nausea, burping and decreased appetite. She has afib and last took eliquid today in AM. She had her last BM yesterday. She has a H/o ulceratvie colitis S/P total abd colectomy in 1996.  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 at age 38 ovaries were spared 1984      S/P lumbar spinal fusion  L4-5 2006, 4/2022      S/P colectomy  1998      S/P tonsillectomy and adenoidectomy      S/P arthroscopy of left shoulder  2016      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      History of lumbar laminectomy  L4-L5          MEDICATIONS  (STANDING):  apixaban 5 milliGRAM(s) Oral two times a day  atorvastatin 40 milliGRAM(s) Oral at bedtime  ciprofloxacin   IVPB      ciprofloxacin   IVPB 400 milliGRAM(s) IV Intermittent every 12 hours  dronedarone 200 milliGRAM(s) Oral two times a day  DULoxetine 60 milliGRAM(s) Oral daily  levothyroxine 25 MICROGram(s) Oral daily  melatonin 5 milliGRAM(s) Oral at bedtime  memantine 10 milliGRAM(s) Oral two times a day  mercaptopurine 50 milliGRAM(s) Oral daily  mesalamine Suppository 1000 milliGRAM(s) Rectal <User Schedule>  metoprolol succinate ER 50 milliGRAM(s) Oral daily  metroNIDAZOLE  IVPB 500 milliGRAM(s) IV Intermittent every 8 hours  pantoprazole  Injectable 40 milliGRAM(s) IV Push daily  potassium phosphate IVPB 30 milliMole(s) IV Intermittent once  sodium chloride 0.9% lock flush 3 milliLiter(s) IV Push every 8 hours    MEDICATIONS  (PRN):  acetaminophen   IVPB .. 1000 milliGRAM(s) IV Intermittent once PRN Mild Pain (1 - 3)  aluminum hydroxide/magnesium hydroxide/simethicone Suspension 30 milliLiter(s) Oral every 4 hours PRN Dyspepsia  ketorolac   Injectable 15 milliGRAM(s) IV Push every 6 hours PRN Moderate Pain (4 - 6)  prochlorperazine   Injectable 10 milliGRAM(s) IV Push every 6 hours PRN nausea      Allergies    Demerol (Unknown)    Intolerances    codeine (Vomiting; Nausea)  meperidine (Vomiting; Nausea)      SOCIAL HISTORY:    FAMILY HISTORY:  Family history of osteoarthritis (Mother)        REVIEW OF SYSTEMS:    CONSTITUTIONAL: No weakness, fevers or chills  EYES/ENT: No visual changes;  No vertigo or throat pain   NECK: No pain or stiffness  RESPIRATORY: No cough, wheezing, hemoptysis; No shortness of breath  CARDIOVASCULAR: No chest pain or palpitations  GASTROINTESTINAL: No abdominal or epigastric pain. No nausea, vomiting, or hematemesis; No diarrhea or constipation. No melena or hematochezia.  GENITOURINARY: No dysuria, frequency or hematuria  NEUROLOGICAL: No numbness or weakness  SKIN: No itching, burning, rashes, or lesions   PSYCH: Normal mood and affect  All other review of systems is negative unless indicated above.    Vital Signs Last 24 Hrs  T(C): 36.6 (19 Sep 2022 08:42), Max: 36.8 (19 Sep 2022 05:17)  T(F): 97.8 (19 Sep 2022 08:42), Max: 98.2 (19 Sep 2022 05:17)  HR: 103 (19 Sep 2022 08:42) (95 - 103)  BP: 106/73 (19 Sep 2022 08:42) (106/73 - 136/74)  BP(mean): --  RR: 17 (19 Sep 2022 08:42) (17 - 18)  SpO2: 95% (19 Sep 2022 08:42) (94% - 95%)    Parameters below as of 19 Sep 2022 08:42  Patient On (Oxygen Delivery Method): room air        PHYSICAL EXAM:    Constitutional: No acute distress, well-developed, non-toxic appearing  HEENT: masked, good phonation, not icteric  Neck: supple, no lymphadenopathy  Respiratory: clear to ascultation bilaterally, no wheezing  Cardiovascular: S1 and S2, regular rate and rhythm, no murmurs rubs or gallops  Gastrointestinal: soft, non-tender, non-distended, +bowel sounds, no rebound or guarding, no surgical scars, no drains  Extremities: No peripheral edema, no cyanosis or clubbing  Vascular: 2+ peripheral pulses, no venous stasis  Neurological: A/O x 3, no focal deficits, no asterixis  Psychiatric: Normal mood, normal affect  Skin: No rashes, not jaundiced    LABS:                        11.9   8.18  )-----------( 211      ( 19 Sep 2022 08:05 )             36.4     09-19    138  |  107  |  12  ----------------------------<  94  3.6   |  25  |  0.62    Ca    8.0<L>      19 Sep 2022 08:05  Phos  1.5     09-19  Mg     1.7     09-19    TPro  5.6<L>  /  Alb  2.8<L>  /  TBili  0.7  /  DBili  x   /  AST  19  /  ALT  13  /  AlkPhos  67  09-18      LIVER FUNCTIONS - ( 18 Sep 2022 15:46 )  Alb: 2.8 g/dL / Pro: 5.6 gm/dL / ALK PHOS: 67 U/L / ALT: 13 U/L / AST: 19 U/L / GGT: x             RADIOLOGY & ADDITIONAL STUDIES: Patient is a 77y old  Female who presents with a chief complaint of SBO (19 Sep 2022 14:22)    77 year old woman with ulcerative colitis s/p colectomy, admitted with two weeks of intermittent abdominal pain and found to have SBO.     Patient states that for two weeks she had intermittent abdominal pain, came and went, was dull/diffuse. Three days prior to admission developed severe pain,located in central abdomen, without radiation, characterized as sharp, sudden onset and intermittent/waxed and waned. No alleviating or aggravating factors. No vomiting but did have nausea, burping, and did not want to eat food. No BM for a few days. In ED was found to have SBO. Now s/p an NG tube with conservative management and excellent decompression.     This AM patient states that she is feeling better. She is tolerating her clear liquid diet. She had two BM's today, mushy consistency. Is passing gas. Pain much improved. No vomiting. No fevers or chills. Denies any overt signs of GI bleeding like hematemesis, melena, hematochezia.       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 at age 38 ovaries were spared 1984      S/P lumbar spinal fusion  L4-5 2006, 4/2022      S/P colectomy  1998      S/P tonsillectomy and adenoidectomy      S/P arthroscopy of left shoulder  2016      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      History of lumbar laminectomy  L4-L5    MEDICATIONS  (STANDING):  apixaban 5 milliGRAM(s) Oral two times a day  atorvastatin 40 milliGRAM(s) Oral at bedtime  ciprofloxacin   IVPB      ciprofloxacin   IVPB 400 milliGRAM(s) IV Intermittent every 12 hours  dronedarone 200 milliGRAM(s) Oral two times a day  DULoxetine 60 milliGRAM(s) Oral daily  levothyroxine 25 MICROGram(s) Oral daily  melatonin 5 milliGRAM(s) Oral at bedtime  memantine 10 milliGRAM(s) Oral two times a day  mercaptopurine 50 milliGRAM(s) Oral daily  mesalamine Suppository 1000 milliGRAM(s) Rectal <User Schedule>  metoprolol succinate ER 50 milliGRAM(s) Oral daily  metroNIDAZOLE  IVPB 500 milliGRAM(s) IV Intermittent every 8 hours  pantoprazole  Injectable 40 milliGRAM(s) IV Push daily  potassium phosphate IVPB 30 milliMole(s) IV Intermittent once  sodium chloride 0.9% lock flush 3 milliLiter(s) IV Push every 8 hours    MEDICATIONS  (PRN):  acetaminophen   IVPB .. 1000 milliGRAM(s) IV Intermittent once PRN Mild Pain (1 - 3)  aluminum hydroxide/magnesium hydroxide/simethicone Suspension 30 milliLiter(s) Oral every 4 hours PRN Dyspepsia  ketorolac   Injectable 15 milliGRAM(s) IV Push every 6 hours PRN Moderate Pain (4 - 6)  prochlorperazine   Injectable 10 milliGRAM(s) IV Push every 6 hours PRN nausea      Allergies    Demerol (Unknown)    Intolerances    codeine (Vomiting; Nausea)  meperidine (Vomiting; Nausea)      SOCIAL HISTORY:  no smoking, drinking or drugs    FAMILY HISTORY:  Family history of osteoarthritis (Mother)  no colon or stomach cancer      REVIEW OF SYSTEMS:    CONSTITUTIONAL: No weakness, fevers or chills  EYES/ENT: No visual changes;  No vertigo or throat pain   NECK: No pain or stiffness  RESPIRATORY: No cough, wheezing, hemoptysis; No shortness of breath  CARDIOVASCULAR: No chest pain or palpitations  GASTROINTESTINAL: see HPI  GENITOURINARY: No dysuria, frequency or hematuria  NEUROLOGICAL: No numbness or weakness  SKIN: No itching, burning, rashes, or lesions   PSYCH: Normal mood and affect  All other review of systems is negative unless indicated above.    Vital Signs Last 24 Hrs  T(C): 36.6 (19 Sep 2022 08:42), Max: 36.8 (19 Sep 2022 05:17)  T(F): 97.8 (19 Sep 2022 08:42), Max: 98.2 (19 Sep 2022 05:17)  HR: 103 (19 Sep 2022 08:42) (95 - 103)  BP: 106/73 (19 Sep 2022 08:42) (106/73 - 136/74)  BP(mean): --  RR: 17 (19 Sep 2022 08:42) (17 - 18)  SpO2: 95% (19 Sep 2022 08:42) (94% - 95%)    Parameters below as of 19 Sep 2022 08:42  Patient On (Oxygen Delivery Method): room air        PHYSICAL EXAM:    Constitutional: No acute distress, well-developed, non-toxic appearing  HEENT: masked, good phonation, not icteric  Neck: supple, no lymphadenopathy  Respiratory: clear to ascultation bilaterally, no wheezing  Cardiovascular: S1 and S2, regular rate and rhythm, no murmurs rubs or gallops  Gastrointestinal: soft, non-tender, distended per body habitus, +bowel sounds, no rebound or guarding, + surgical scars, no drains  Extremities: No peripheral edema, no cyanosis or clubbing  Vascular: 2+ peripheral pulses, no venous stasis  Neurological: A/O x 3, no focal deficits, no asterixis  Psychiatric: Normal mood, normal affect  Skin: No rashes, not jaundiced    LABS:                        11.9   8.18  )-----------( 211      ( 19 Sep 2022 08:05 )             36.4     09-19    138  |  107  |  12  ----------------------------<  94  3.6   |  25  |  0.62    Ca    8.0<L>      19 Sep 2022 08:05  Phos  1.5     09-19  Mg     1.7     09-19    TPro  5.6<L>  /  Alb  2.8<L>  /  TBili  0.7  /  DBili  x   /  AST  19  /  ALT  13  /  AlkPhos  67  09-18      LIVER FUNCTIONS - ( 18 Sep 2022 15:46 )  Alb: 2.8 g/dL / Pro: 5.6 gm/dL / ALK PHOS: 67 U/L / ALT: 13 U/L / AST: 19 U/L / GGT: x             RADIOLOGY & ADDITIONAL STUDIES: CT with high grade SBO, L renal mass (known)

## 2022-09-19 NOTE — DISCHARGE NOTE PROVIDER - NSDCMRMEDTOKEN_GEN_ALL_CORE_FT
Acidophilus oral capsule: 1 cap(s) orally once a day-4mg  Eliquis 5 mg oral tablet: 1 tab(s) orally 2 times a day  levothyroxine 25 mcg (0.025 mg) oral tablet: 1 tab(s) orally once a day  Melatonin 5 mg oral tablet: 1 tab(s) orally once a day (at bedtime), As Needed  memantine 10 mg oral tablet: 1 tab(s) orally 2 times a day  mercaptopurine 50 mg oral tablet: 1 tab(s) orally once a day  mesalamine 1000 mg rectal suppository: 1 suppository(ies) rectal Monday, Wednesday, and Friday  metoprolol succinate 50 mg oral tablet, extended release: 1 tab(s) orally once a day  Multaq 400 mg oral tablet: 0.5 tab(s) orally 2 times a day  rosuvastatin 5 mg oral tablet: 1 tab(s) orally once a day (at bedtime)  senna (sennosides) 8.6 mg oral tablet: 1 tab(s) orally 2 times a day   triamterene-hydrochlorothiazide 37.5 mg-25 mg oral capsule: 1 cap(s) orally once a day

## 2022-09-20 ENCOUNTER — TRANSCRIPTION ENCOUNTER (OUTPATIENT)
Age: 77
End: 2022-09-20

## 2022-09-20 VITALS
TEMPERATURE: 97 F | OXYGEN SATURATION: 99 % | SYSTOLIC BLOOD PRESSURE: 133 MMHG | RESPIRATION RATE: 18 BRPM | HEART RATE: 86 BPM | DIASTOLIC BLOOD PRESSURE: 93 MMHG

## 2022-09-20 LAB
ANION GAP SERPL CALC-SCNC: 5 MMOL/L — SIGNIFICANT CHANGE UP (ref 5–17)
BUN SERPL-MCNC: 9 MG/DL — SIGNIFICANT CHANGE UP (ref 7–23)
CALCIUM SERPL-MCNC: 8.1 MG/DL — LOW (ref 8.5–10.1)
CHLORIDE SERPL-SCNC: 105 MMOL/L — SIGNIFICANT CHANGE UP (ref 96–108)
CO2 SERPL-SCNC: 25 MMOL/L — SIGNIFICANT CHANGE UP (ref 22–31)
CREAT SERPL-MCNC: 0.58 MG/DL — SIGNIFICANT CHANGE UP (ref 0.5–1.3)
EGFR: 92 ML/MIN/1.73M2 — SIGNIFICANT CHANGE UP
GLUCOSE SERPL-MCNC: 98 MG/DL — SIGNIFICANT CHANGE UP (ref 70–99)
HCT VFR BLD CALC: 36.1 % — SIGNIFICANT CHANGE UP (ref 34.5–45)
HGB BLD-MCNC: 11.6 G/DL — SIGNIFICANT CHANGE UP (ref 11.5–15.5)
MAGNESIUM SERPL-MCNC: 1.5 MG/DL — LOW (ref 1.6–2.6)
MCHC RBC-ENTMCNC: 29.7 PG — SIGNIFICANT CHANGE UP (ref 27–34)
MCHC RBC-ENTMCNC: 32.1 GM/DL — SIGNIFICANT CHANGE UP (ref 32–36)
MCV RBC AUTO: 92.6 FL — SIGNIFICANT CHANGE UP (ref 80–100)
PHOSPHATE SERPL-MCNC: 2.1 MG/DL — LOW (ref 2.5–4.5)
PLATELET # BLD AUTO: 215 K/UL — SIGNIFICANT CHANGE UP (ref 150–400)
POTASSIUM SERPL-MCNC: 3.4 MMOL/L — LOW (ref 3.5–5.3)
POTASSIUM SERPL-SCNC: 3.4 MMOL/L — LOW (ref 3.5–5.3)
RBC # BLD: 3.9 M/UL — SIGNIFICANT CHANGE UP (ref 3.8–5.2)
RBC # FLD: 16.2 % — HIGH (ref 10.3–14.5)
SODIUM SERPL-SCNC: 135 MMOL/L — SIGNIFICANT CHANGE UP (ref 135–145)
WBC # BLD: 10.12 K/UL — SIGNIFICANT CHANGE UP (ref 3.8–10.5)
WBC # FLD AUTO: 10.12 K/UL — SIGNIFICANT CHANGE UP (ref 3.8–10.5)

## 2022-09-20 PROCEDURE — 99232 SBSQ HOSP IP/OBS MODERATE 35: CPT

## 2022-09-20 PROCEDURE — 99232 SBSQ HOSP IP/OBS MODERATE 35: CPT | Mod: GC

## 2022-09-20 RX ORDER — SODIUM,POTASSIUM PHOSPHATES 278-250MG
1 POWDER IN PACKET (EA) ORAL ONCE
Refills: 0 | Status: COMPLETED | OUTPATIENT
Start: 2022-09-20 | End: 2022-09-20

## 2022-09-20 RX ORDER — CIPROFLOXACIN LACTATE 400MG/40ML
1 VIAL (ML) INTRAVENOUS
Qty: 14 | Refills: 0
Start: 2022-09-20

## 2022-09-20 RX ORDER — INFLUENZA VIRUS VACCINE 15; 15; 15; 15 UG/.5ML; UG/.5ML; UG/.5ML; UG/.5ML
0.7 SUSPENSION INTRAMUSCULAR ONCE
Refills: 0 | Status: DISCONTINUED | OUTPATIENT
Start: 2022-09-20 | End: 2022-09-20

## 2022-09-20 RX ORDER — METRONIDAZOLE 500 MG
1 TABLET ORAL
Qty: 21 | Refills: 0
Start: 2022-09-20

## 2022-09-20 RX ADMIN — DRONEDARONE 200 MILLIGRAM(S): 400 TABLET, FILM COATED ORAL at 10:26

## 2022-09-20 RX ADMIN — Medication 50 MILLIGRAM(S): at 10:25

## 2022-09-20 RX ADMIN — PANTOPRAZOLE SODIUM 40 MILLIGRAM(S): 20 TABLET, DELAYED RELEASE ORAL at 10:25

## 2022-09-20 RX ADMIN — MEMANTINE HYDROCHLORIDE 10 MILLIGRAM(S): 10 TABLET ORAL at 10:25

## 2022-09-20 RX ADMIN — Medication 200 MILLIGRAM(S): at 06:10

## 2022-09-20 RX ADMIN — MERCAPTOPURINE 50 MILLIGRAM(S): 50 TABLET ORAL at 10:27

## 2022-09-20 RX ADMIN — DULOXETINE HYDROCHLORIDE 60 MILLIGRAM(S): 30 CAPSULE, DELAYED RELEASE ORAL at 10:27

## 2022-09-20 RX ADMIN — SODIUM CHLORIDE 3 MILLILITER(S): 9 INJECTION INTRAMUSCULAR; INTRAVENOUS; SUBCUTANEOUS at 05:27

## 2022-09-20 RX ADMIN — Medication 100 MILLIGRAM(S): at 05:08

## 2022-09-20 RX ADMIN — Medication 25 MICROGRAM(S): at 05:08

## 2022-09-20 RX ADMIN — Medication 1 PACKET(S): at 12:18

## 2022-09-20 RX ADMIN — APIXABAN 5 MILLIGRAM(S): 2.5 TABLET, FILM COATED ORAL at 10:26

## 2022-09-20 NOTE — PROGRESS NOTE ADULT - ASSESSMENT
77 year old woman with ulcerative colitis s/p colectomy, admitted with two weeks of intermittent abdominal pain and found to have SBO.     Patient improving on regular diet with +BM without pain.  Likely to go home today. Will defer discharge to surgery .

## 2022-09-20 NOTE — PROGRESS NOTE ADULT - SUBJECTIVE AND OBJECTIVE BOX
SURGERY DAILY PROGRESS NOTE:     Subjective:  Patient seen and examined this AM at bedside. No acute events overnight and patient resting comfortably. Tolerated reg diet w/o n/v, having bowel function. Denies fever/chills, shortness of breath, chest pain. VS reviewed    Objective:    MEDICATIONS  (STANDING):  apixaban 5 milliGRAM(s) Oral two times a day  atorvastatin 40 milliGRAM(s) Oral at bedtime  ciprofloxacin   IVPB      ciprofloxacin   IVPB 400 milliGRAM(s) IV Intermittent every 12 hours  dronedarone 200 milliGRAM(s) Oral two times a day  DULoxetine 60 milliGRAM(s) Oral daily  levothyroxine 25 MICROGram(s) Oral daily  melatonin 5 milliGRAM(s) Oral at bedtime  memantine 10 milliGRAM(s) Oral two times a day  mercaptopurine 50 milliGRAM(s) Oral daily  mesalamine Suppository 1000 milliGRAM(s) Rectal <User Schedule>  metoprolol succinate ER 50 milliGRAM(s) Oral daily  metroNIDAZOLE  IVPB 500 milliGRAM(s) IV Intermittent every 8 hours  pantoprazole  Injectable 40 milliGRAM(s) IV Push daily  potassium phosphate / sodium phosphate Powder (PHOS-NaK) 1 Packet(s) Oral once  sodium chloride 0.9% lock flush 3 milliLiter(s) IV Push every 8 hours    MEDICATIONS  (PRN):  acetaminophen   IVPB .. 1000 milliGRAM(s) IV Intermittent once PRN Mild Pain (1 - 3)  aluminum hydroxide/magnesium hydroxide/simethicone Suspension 30 milliLiter(s) Oral every 4 hours PRN Dyspepsia  ketorolac   Injectable 15 milliGRAM(s) IV Push every 6 hours PRN Moderate Pain (4 - 6)  prochlorperazine   Injectable 10 milliGRAM(s) IV Push every 6 hours PRN nausea      Vital Signs Last 24 Hrs  T(C): 36.3 (20 Sep 2022 08:59), Max: 36.8 (20 Sep 2022 00:40)  T(F): 97.3 (20 Sep 2022 08:59), Max: 98.2 (20 Sep 2022 00:40)  HR: 86 (20 Sep 2022 08:59) (86 - 98)  BP: 133/93 (20 Sep 2022 08:59) (123/83 - 136/94)  BP(mean): --  RR: 18 (20 Sep 2022 08:59) (17 - 18)  SpO2: 99% (20 Sep 2022 08:59) (96% - 99%)    Parameters below as of 20 Sep 2022 08:59  Patient On (Oxygen Delivery Method): room air          PHYSICAL EXAM   GENERAL: NAD, AOx3, well developed  HEAD: Atraumatic, normocephalic  EYES: EOMI, PERRLA, conjunctiva and sclera clear  ENT: moist mucous membrane  NECK: supple, No JVD, midline trachea  CHEST/LUNG: unlabored respirations b/l  Heart: S1, S2 normal  ABDOMEN: soft, distended, mildly tender to palpation. well healed surgical sites.  EXTREMITIES: +2 peripheral pulses, brisk cap refill. no clubbing, cyanosis or edema  NERVOUS SYSTEM: AOx3, speech clear, no neuro-deficits  MSK: full ROM, no deformities  SKIN: warm to touch, no rash or lesions        I&O's Detail      Daily     Daily     LABS:                        11.6   10.12 )-----------( 215      ( 20 Sep 2022 07:53 )             36.1     09-20    135  |  105  |  9   ----------------------------<  98  3.4<L>   |  25  |  0.58    Ca    8.1<L>      20 Sep 2022 07:53  Phos  2.1     09-20  Mg     1.5     09-20    TPro  5.6<L>  /  Alb  2.8<L>  /  TBili  0.7  /  DBili  x   /  AST  19  /  ALT  13  /  AlkPhos  67  09-18          RADIOLOGY & ADDITIONAL STUDIES:

## 2022-09-20 NOTE — PROGRESS NOTE ADULT - NS ATTEND AMEND GEN_ALL_CORE FT
77 year old woman with SBO, markedly improved after NGT and now tolerating meals.     Continue diet. Ok to discharge but defer to surgery.

## 2022-09-20 NOTE — PROGRESS NOTE ADULT - ASSESSMENT
78 yo female with SBO, unlikely to be closed loop but high grade obstruction  lactic acid/wbc normal. With bowel function, tolerating reg diet    Plan:   monitor vitals signs  monitor BF  LRD  continue IV fluid  DVT/GI PPX  continue conservative management  discharge today    Plan discussed with Dr Crowley

## 2022-09-20 NOTE — PROGRESS NOTE ADULT - SUBJECTIVE AND OBJECTIVE BOX
c/c: n/v/abd pain    HPI: 77F, pmh of mild dementia,  ulcerative colitis s/p total colectomy in 1996, Paroxysmal atrial fibrillation, HTN, Hypothyroid, Anxiety/depression, OA s/p recent knee replacement 4 weeks ago @ [facility], who is admitted to surgical service with high grade sbo. Hospitalist service consulted for medical comanagement.   Pt improved with conservative management. NGT removed and diet advanced.    Pt seen and examined this am.  Felt well. Ate breakfast. no pain/n/v. no sob/chest pain. Had 4 bms. Wants to go home.     ROS: all 10 systems reviewed and is as above otherwise negative.     Vital Signs Last 24 Hrs  T(C): 36.3 (20 Sep 2022 08:59), Max: 36.8 (20 Sep 2022 00:40)  T(F): 97.3 (20 Sep 2022 08:59), Max: 98.2 (20 Sep 2022 00:40)  HR: 86 (20 Sep 2022 08:59) (86 - 98)  BP: 133/93 (20 Sep 2022 08:59) (123/83 - 136/94)  RR: 18 (20 Sep 2022 08:59) (17 - 18)  SpO2: 99% (20 Sep 2022 08:59) (96% - 99%)    Parameters below as of 20 Sep 2022 08:59  Patient On (Oxygen Delivery Method): room air        PHYSICAL EXAM:    GENERAL: Comfortable, no acute distress   HEAD:  Normocephalic, atraumatic  EYES: EOMI, PERRLA  HEENT: Moist mucous membranes  NECK: Supple, No JVD  NERVOUS SYSTEM:  Alert & Oriented X3, forgetful, Motor Strength 5/5 B/L upper and lower extremities  CHEST/LUNG: Clear to auscultation bilaterally  HEART: Regular rate and rhythm  ABDOMEN: Soft, NT, BS+  GENITOURINARY: Voiding, no palpable bladder  EXTREMITIES:   No clubbing, cyanosis, or edema  MUSCULOSKELETAL- No muscle tenderness, no joint tenderness  SKIN-no rash    LABS:                        11.6   10.12 )-----------( 215      ( 20 Sep 2022 07:53 )             36.1     09-20    135  |  105  |  9   ----------------------------<  98  3.4<L>   |  25  |  0.58    Ca    8.1<L>      20 Sep 2022 07:53  Phos  2.1     09-20  Mg     1.5     09-20    TPro  5.6<L>  /  Alb  2.8<L>  /  TBili  0.7  /  DBili  x   /  AST  19  /  ALT  13  /  AlkPhos  67  09-18        CT Abdomen and Pelvis w/ IV Cont (09.15.22 @ 11:37) >  FINDINGS:  LOWER CHEST: Mild dependent changes are seen posteriorly..    LIVER: Within normal limits.  BILE DUCTS: Normal caliber.  GALLBLADDER: Within normal limits.  SPLEEN: Within normal limits.  PANCREAS: Within normal limits.  ADRENALS: There is a 3.5 cm left suprarenal mass, with internal speckled   calcification. The organ of origin is uncertain. This may be adrenal or   renal in origin. A fat plane appears preserved between this lesion and   the spleen or pancreas and therefore this is unlikely to be colonic or   pancreatic in origin.  KIDNEYS/URETERS: Subcentimeter cortical hypodensities of the kidneys   likely represent small cysts. Above-described left suprarenal mass is of   uncertain origin. There is a focal area of loss of the intervening fat   plane between the kidney and the lesion, possibility of renal origin is   raised.    BLADDER: Within normal limits.  REPRODUCTIVE ORGANS: Hysterectomy.    BOWEL: Subtotal colectomy changes are appreciated. There is marked   dilatation of small bowel loops measuring up to 5.5 cm in diameter. There   is associated mild wall thickening. Within the pelvis image 2:63, there   are 2 bowel loops which appear focally narrowed. The intervening bowel   loop is the most distended, though bowel loops proximal to this are also   dilated. Findings are highly suspicious for high-grade closed-loop small   bowel obstruction. There is a small hiatal hernia.      PERITONEUM: No ascites.  VESSELS: Atherosclerotic changes. Calcification seen along the left renal   vein, possibly related to prior no vein thrombosis.  RETROPERITONEUM/LYMPH NODES: No lymphadenopathy.  ABDOMINAL WALL: Postsurgical changes.  BONES: Sclerotic deformity and multilevel degenerative changes of the   spine appreciated. There is generalized osteopenia. Multilevel   vertebroplasty changes are appreciated at L2-L5. Associated posterior   pedicle screws and rods are also appreciated at these levels.    IMPRESSION:    Findings compatible with high-grade closed-loop small bowel obstruction.    3.5 cm suprarenal mass with internal speckled calcification. The organ of   origin is not definitively. This is likely of adrenal or left renal   origin. Alternatively, this may be related to a neurogenic tumor.   Consider further correlation with adrenal/renal protocol MRI for further   delineation. Comparison to previous exams also be of value to establish   stability, if available elsewhere.    Question of previous left renal vein thrombosis.      MEDICATIONS  (STANDING):  apixaban 5 milliGRAM(s) Oral two times a day  atorvastatin 40 milliGRAM(s) Oral at bedtime  ciprofloxacin   IVPB      ciprofloxacin   IVPB 400 milliGRAM(s) IV Intermittent every 12 hours  dronedarone 200 milliGRAM(s) Oral two times a day  DULoxetine 60 milliGRAM(s) Oral daily  influenza  Vaccine (HIGH DOSE) 0.7 milliLiter(s) IntraMuscular once  levothyroxine 25 MICROGram(s) Oral daily  melatonin 5 milliGRAM(s) Oral at bedtime  memantine 10 milliGRAM(s) Oral two times a day  mercaptopurine 50 milliGRAM(s) Oral daily  mesalamine Suppository 1000 milliGRAM(s) Rectal <User Schedule>  metoprolol succinate ER 50 milliGRAM(s) Oral daily  metroNIDAZOLE  IVPB 500 milliGRAM(s) IV Intermittent every 8 hours  pantoprazole  Injectable 40 milliGRAM(s) IV Push daily  sodium chloride 0.9% lock flush 3 milliLiter(s) IV Push every 8 hours    MEDICATIONS  (PRN):  acetaminophen   IVPB .. 1000 milliGRAM(s) IV Intermittent once PRN Mild Pain (1 - 3)  aluminum hydroxide/magnesium hydroxide/simethicone Suspension 30 milliLiter(s) Oral every 4 hours PRN Dyspepsia  ketorolac   Injectable 15 milliGRAM(s) IV Push every 6 hours PRN Moderate Pain (4 - 6)  prochlorperazine   Injectable 10 milliGRAM(s) IV Push every 6 hours PRN nausea    ASSESSMENT AND PLAN:  77f, PMH as above a/w    1. High grade SBO:  -seems to have improved.   -NGT removed  -diet advanced.   -ambulate.   -incentive spirometry.   -on abx for "pouchitis" per surgery.  -repletion for hypophosphatemia/hypomagnesemia.    2. Paroxysmal atrial fibrillation:  -continue bb, multaq.  -eliquis.     3. HTN:  -continue bb    4. Hypothyroid:  -synthroid.    5. Ulcerative colitis:  -continue mercaptopurine/mesalamine     6. Dementia.   -namenda    7.  Suprarenal mass  - 3.5 cm with internal speckled calcification  - old per nephrology note.   -pt to f/u with pcp outpt.    8. DVT px:  -eliquis.

## 2022-09-20 NOTE — PROGRESS NOTE ADULT - PROVIDER SPECIALTY LIST ADULT
Gastroenterology
Hospitalist
Surgery
Surgery
Hospitalist
Surgery

## 2022-09-20 NOTE — PROGRESS NOTE ADULT - SUBJECTIVE AND OBJECTIVE BOX
Patient is a 77y old  Female who presents with a chief complaint of SBO    Seen at bedside this morning, reports tolerating eggs and toast this morning. + gas and "mooshy" bowel movements.       MEDICATIONS  (STANDING):  apixaban 5 milliGRAM(s) Oral two times a day  atorvastatin 40 milliGRAM(s) Oral at bedtime  ciprofloxacin   IVPB 400 milliGRAM(s) IV Intermittent every 12 hours  ciprofloxacin   IVPB      dronedarone 200 milliGRAM(s) Oral two times a day  DULoxetine 60 milliGRAM(s) Oral daily  levothyroxine 25 MICROGram(s) Oral daily  melatonin 5 milliGRAM(s) Oral at bedtime  memantine 10 milliGRAM(s) Oral two times a day  mercaptopurine 50 milliGRAM(s) Oral daily  mesalamine Suppository 1000 milliGRAM(s) Rectal <User Schedule>  metoprolol succinate ER 50 milliGRAM(s) Oral daily  metroNIDAZOLE  IVPB 500 milliGRAM(s) IV Intermittent every 8 hours  pantoprazole  Injectable 40 milliGRAM(s) IV Push daily  potassium phosphate / sodium phosphate Powder (PHOS-NaK) 1 Packet(s) Oral once  sodium chloride 0.9% lock flush 3 milliLiter(s) IV Push every 8 hours    MEDICATIONS  (PRN):  acetaminophen   IVPB .. 1000 milliGRAM(s) IV Intermittent once PRN Mild Pain (1 - 3)  aluminum hydroxide/magnesium hydroxide/simethicone Suspension 30 milliLiter(s) Oral every 4 hours PRN Dyspepsia  ketorolac   Injectable 15 milliGRAM(s) IV Push every 6 hours PRN Moderate Pain (4 - 6)  prochlorperazine   Injectable 10 milliGRAM(s) IV Push every 6 hours PRN nausea      Vital Signs Last 24 Hrs  T(C): 36.3 (20 Sep 2022 08:59), Max: 36.8 (20 Sep 2022 00:40)  T(F): 97.3 (20 Sep 2022 08:59), Max: 98.2 (20 Sep 2022 00:40)  HR: 86 (20 Sep 2022 08:59) (86 - 98)  BP: 133/93 (20 Sep 2022 08:59) (123/83 - 136/94)  BP(mean): --  RR: 18 (20 Sep 2022 08:59) (17 - 18)  SpO2: 99% (20 Sep 2022 08:59) (96% - 99%)    Parameters below as of 20 Sep 2022 08:59  Patient On (Oxygen Delivery Method): room air        PHYSICAL EXAM:    Constitutional: No acute distress, well-developed, non-toxic appearing  HEENT: masked, good phonation, not icteric  Neck: supple, no lymphadenopathy  Respiratory: clear to ascultation bilaterally, no wheezing  Cardiovascular: S1 and S2, regular rate and rhythm, no murmurs rubs or gallops  Gastrointestinal: soft, non-tender, non-distended, +bowel sounds, no rebound or guarding, no surgical scars, no drains  Extremities: No peripheral edema, no cyanosis or clubbing  Vascular: 2+ peripheral pulses, no venous stasis  Neurological: A/O x 3, no focal deficits, no asterixis  Psychiatric: Normal mood, normal affect  Skin: No rashes, not jaundiced    LABS:                        11.6   10.12 )-----------( 215      ( 20 Sep 2022 07:53 )             36.1     09-20    135  |  105  |  9   ----------------------------<  98  3.4<L>   |  25  |  0.58    Ca    8.1<L>      20 Sep 2022 07:53  Phos  2.1     09-20  Mg     1.5     09-20    TPro  5.6<L>  /  Alb  2.8<L>  /  TBili  0.7  /  DBili  x   /  AST  19  /  ALT  13  /  AlkPhos  67  09-18      LIVER FUNCTIONS - ( 18 Sep 2022 15:46 )  Alb: 2.8 g/dL / Pro: 5.6 gm/dL / ALK PHOS: 67 U/L / ALT: 13 U/L / AST: 19 U/L / GGT: x             RADIOLOGY & ADDITIONAL STUDIES: Patient is a 77y old  Female who presents with a chief complaint of SBO  Follow up for abdominal pain    Seen at bedside this morning, reports tolerating eggs and toast this morning. + gas and "mooshy" bowel movements.       MEDICATIONS  (STANDING):  apixaban 5 milliGRAM(s) Oral two times a day  atorvastatin 40 milliGRAM(s) Oral at bedtime  ciprofloxacin   IVPB 400 milliGRAM(s) IV Intermittent every 12 hours  ciprofloxacin   IVPB      dronedarone 200 milliGRAM(s) Oral two times a day  DULoxetine 60 milliGRAM(s) Oral daily  levothyroxine 25 MICROGram(s) Oral daily  melatonin 5 milliGRAM(s) Oral at bedtime  memantine 10 milliGRAM(s) Oral two times a day  mercaptopurine 50 milliGRAM(s) Oral daily  mesalamine Suppository 1000 milliGRAM(s) Rectal <User Schedule>  metoprolol succinate ER 50 milliGRAM(s) Oral daily  metroNIDAZOLE  IVPB 500 milliGRAM(s) IV Intermittent every 8 hours  pantoprazole  Injectable 40 milliGRAM(s) IV Push daily  potassium phosphate / sodium phosphate Powder (PHOS-NaK) 1 Packet(s) Oral once  sodium chloride 0.9% lock flush 3 milliLiter(s) IV Push every 8 hours    MEDICATIONS  (PRN):  acetaminophen   IVPB .. 1000 milliGRAM(s) IV Intermittent once PRN Mild Pain (1 - 3)  aluminum hydroxide/magnesium hydroxide/simethicone Suspension 30 milliLiter(s) Oral every 4 hours PRN Dyspepsia  ketorolac   Injectable 15 milliGRAM(s) IV Push every 6 hours PRN Moderate Pain (4 - 6)  prochlorperazine   Injectable 10 milliGRAM(s) IV Push every 6 hours PRN nausea      Vital Signs Last 24 Hrs  T(C): 36.3 (20 Sep 2022 08:59), Max: 36.8 (20 Sep 2022 00:40)  T(F): 97.3 (20 Sep 2022 08:59), Max: 98.2 (20 Sep 2022 00:40)  HR: 86 (20 Sep 2022 08:59) (86 - 98)  BP: 133/93 (20 Sep 2022 08:59) (123/83 - 136/94)  BP(mean): --  RR: 18 (20 Sep 2022 08:59) (17 - 18)  SpO2: 99% (20 Sep 2022 08:59) (96% - 99%)    Parameters below as of 20 Sep 2022 08:59  Patient On (Oxygen Delivery Method): room air        PHYSICAL EXAM:    Constitutional: No acute distress, well-developed, non-toxic appearing  HEENT: masked, good phonation, not icteric  Neck: supple, no lymphadenopathy  Respiratory: clear to ascultation bilaterally, no wheezing  Cardiovascular: S1 and S2, regular rate and rhythm, no murmurs rubs or gallops  Gastrointestinal: soft, non-tender, distended per body habitus +bowel sounds, no rebound or guarding, + surgical scars, no drains  Extremities: No peripheral edema, no cyanosis or clubbing  Vascular: 2+ peripheral pulses, no venous stasis  Neurological: A/O x 3, no focal deficits, no asterixis  Psychiatric: Normal mood, normal affect  Skin: No rashes, not jaundiced    LABS:                        11.6   10.12 )-----------( 215      ( 20 Sep 2022 07:53 )             36.1     09-20    135  |  105  |  9   ----------------------------<  98  3.4<L>   |  25  |  0.58    Ca    8.1<L>      20 Sep 2022 07:53  Phos  2.1     09-20  Mg     1.5     09-20    TPro  5.6<L>  /  Alb  2.8<L>  /  TBili  0.7  /  DBili  x   /  AST  19  /  ALT  13  /  AlkPhos  67  09-18      LIVER FUNCTIONS - ( 18 Sep 2022 15:46 )  Alb: 2.8 g/dL / Pro: 5.6 gm/dL / ALK PHOS: 67 U/L / ALT: 13 U/L / AST: 19 U/L / GGT: x

## 2022-09-20 NOTE — DISCHARGE NOTE NURSING/CASE MANAGEMENT/SOCIAL WORK - PATIENT PORTAL LINK FT
You can access the FollowMyHealth Patient Portal offered by [facility] by registering at the following website: [email or web address]. By joining [facility]’s FollowMyHealth portal, you will also be able to view your health information using other applications (apps) compatible with our system.

## 2022-09-23 DIAGNOSIS — E83.39 OTHER DISORDERS OF PHOSPHORUS METABOLISM: ICD-10-CM

## 2022-09-23 DIAGNOSIS — F41.9 ANXIETY DISORDER, UNSPECIFIED: ICD-10-CM

## 2022-09-23 DIAGNOSIS — K51.90 ULCERATIVE COLITIS, UNSPECIFIED, WITHOUT COMPLICATIONS: ICD-10-CM

## 2022-09-23 DIAGNOSIS — E03.9 HYPOTHYROIDISM, UNSPECIFIED: ICD-10-CM

## 2022-09-23 DIAGNOSIS — E86.0 DEHYDRATION: ICD-10-CM

## 2022-09-23 DIAGNOSIS — E83.51 HYPOCALCEMIA: ICD-10-CM

## 2022-09-23 DIAGNOSIS — N17.9 ACUTE KIDNEY FAILURE, UNSPECIFIED: ICD-10-CM

## 2022-09-23 DIAGNOSIS — I48.0 PAROXYSMAL ATRIAL FIBRILLATION: ICD-10-CM

## 2022-09-23 DIAGNOSIS — K56.609 UNSPECIFIED INTESTINAL OBSTRUCTION, UNSPECIFIED AS TO PARTIAL VERSUS COMPLETE OBSTRUCTION: ICD-10-CM

## 2022-09-23 DIAGNOSIS — I10 ESSENTIAL (PRIMARY) HYPERTENSION: ICD-10-CM

## 2022-09-23 DIAGNOSIS — D50.9 IRON DEFICIENCY ANEMIA, UNSPECIFIED: ICD-10-CM

## 2022-09-23 DIAGNOSIS — F32.A DEPRESSION, UNSPECIFIED: ICD-10-CM

## 2022-09-23 DIAGNOSIS — F03.90 UNSPECIFIED DEMENTIA WITHOUT BEHAVIORAL DISTURBANCE: ICD-10-CM

## 2022-09-30 ENCOUNTER — APPOINTMENT (OUTPATIENT)
Dept: GASTROENTEROLOGY | Facility: AMBULATORY MEDICAL SERVICES | Age: 77
End: 2022-09-30

## 2022-10-11 ENCOUNTER — APPOINTMENT (OUTPATIENT)
Dept: ORTHOPEDIC SURGERY | Facility: CLINIC | Age: 77
End: 2022-10-11

## 2022-10-11 VITALS — HEART RATE: 102 BPM | SYSTOLIC BLOOD PRESSURE: 109 MMHG | DIASTOLIC BLOOD PRESSURE: 80 MMHG

## 2022-10-11 PROCEDURE — 99024 POSTOP FOLLOW-UP VISIT: CPT

## 2022-10-11 PROCEDURE — 73562 X-RAY EXAM OF KNEE 3: CPT | Mod: RT

## 2022-10-11 NOTE — HISTORY OF PRESENT ILLNESS
[Chills] : no chills [Constipation] : no constipation [Diarrhea] : no diarrhea [Dysuria] : no dysuria [Fever] : no fever [Nausea] : no nausea [Vomiting] : no vomiting [Vascular Intact] : ~T peripheral vascular exam normal [Negative Marnie's] : maneuvers demonstrated a negative Marnie's sign [de-identified] : Right total knee replacement August 15, 2022 [de-identified] : The patient is a 77-year-old female who presents today status post right total knee replacement August 15, 2022.  Patient was doing very well however recently felt significantly on her right side and right knee.  At this time she is using a walker.  She is taking no medication at the time patient is here for follow-up for her knee [de-identified] : Skin is clean, dry, intact without erythema or evidence of cellulitis.  There is resolving ecchymosis in and around the anterior aspect of the knee.  No pain to palpation over the patella both in flexion and extension.  Less than 5 degree laxity with varus valgus stresses.  Negative anteroposterior drawer.  No extension lag.  No flexion contracture.  Range of motion 0-1 35.  Calves are soft, nontender, no evidence of DVT at this time.  Patient has good motor and sensory to both legs. [de-identified] : Right total knee replacement, in anatomical alignment, excellent bone to prosthesis interface, there is no gross evidence of prosthetic failure, all 3 components are anatomically aligned.  There are no periprosthetic fractures [de-identified] : Stable postoperative course of a right total knee replacement status post fall [de-identified] : The patient was given a prescription for reconditioning exercises, strength training and ambulation training and balance training.  Patient will continue with analgesics, ice elevation and other conservative treatment modalities.  She will follow-up on a yearly basis.  All of her questions were answered to her satisfaction and  was present during the consultation

## 2022-10-21 ENCOUNTER — APPOINTMENT (OUTPATIENT)
Dept: GASTROENTEROLOGY | Facility: AMBULATORY MEDICAL SERVICES | Age: 77
End: 2022-10-21

## 2022-10-21 PROCEDURE — 43235 EGD DIAGNOSTIC BRUSH WASH: CPT | Mod: GC

## 2022-11-07 NOTE — OCCUPATIONAL THERAPY INITIAL EVALUATION ADULT - PERTINENT HX OF CURRENT PROBLEM, REHAB EVAL
Birth Control Pills Pregnancy And Lactation Text: This medication should be avoided if pregnant and for the first 30 days post-partum. Isotretinoin Counseling: Patient should get monthly blood tests, not donate blood, not drive at night if vision affected, not share medication, and not undergo elective surgery for 6 months after tx completed. Side effects reviewed, pt to contact office should one occur. Azelaic Acid Counseling: Patient counseled that medicine may cause skin irritation and to avoid applying near the eyes.  In the event of skin irritation, the patient was advised to reduce the amount of the drug applied or use it less frequently.   The patient verbalized understanding of the proper use and possible adverse effects of azelaic acid.  All of the patient's questions and concerns were addressed. Use Enhanced Medication Counseling?: No Tazorac Counseling:  Patient advised that medication is irritating and drying.  Patient may need to apply sparingly and wash off after an hour before eventually leaving it on overnight.  The patient verbalized understanding of the proper use and possible adverse effects of tazorac.  All of the patient's questions and concerns were addressed. Benzoyl Peroxide Counseling: Patient counseled that medicine may cause skin irritation and bleach clothing.  In the event of skin irritation, the patient was advised to reduce the amount of the drug applied or use it less frequently.   The patient verbalized understanding of the proper use and possible adverse effects of benzoyl peroxide.  All of the patient's questions and concerns were addressed. Spironolactone Pregnancy And Lactation Text: This medication can cause feminization of the male fetus and should be avoided during pregnancy. The active metabolite is also found in breast milk. Topical Clindamycin Counseling: Patient counseled that this medication may cause skin irritation or allergic reactions.  In the event of skin irritation, the patient was advised to reduce the amount of the drug applied or use it less frequently.   The patient verbalized understanding of the proper use and possible adverse effects of clindamycin.  All of the patient's questions and concerns were addressed. Sarecycline Pregnancy And Lactation Text: This medication is Pregnancy Category D and not consider safe during pregnancy. It is also excreted in breast milk. Winlevi Counseling:  I discussed with the patient the risks of topical clascoterone including but not limited to erythema, scaling, itching, and stinging. Patient voiced their understanding. Azithromycin Pregnancy And Lactation Text: This medication is considered safe during pregnancy and is also secreted in breast milk. Topical Clindamycin Pregnancy And Lactation Text: This medication is Pregnancy Category B and is considered safe during pregnancy. It is unknown if it is excreted in breast milk. Tetracycline Counseling: Patient counseled regarding possible photosensitivity and increased risk for sunburn.  Patient instructed to avoid sunlight, if possible.  When exposed to sunlight, patients should wear protective clothing, sunglasses, and sunscreen.  The patient was instructed to call the office immediately if the following severe adverse effects occur:  hearing changes, easy bruising/bleeding, severe headache, or vision changes.  The patient verbalized understanding of the proper use and possible adverse effects of tetracycline.  All of the patient's questions and concerns were addressed. Patient understands to avoid pregnancy while on therapy due to potential birth defects. Winlevi Pregnancy And Lactation Text: This medication is considered safe during pregnancy and breastfeeding. Bactrim Pregnancy And Lactation Text: This medication is Pregnancy Category D and is known to cause fetal risk.  It is also excreted in breast milk. Erythromycin Counseling:  I discussed with the patient the risks of erythromycin including but not limited to GI upset, allergic reaction, drug rash, diarrhea, increase in liver enzymes, and yeast infections. Aklief counseling:  Patient advised to apply a pea-sized amount only at bedtime and wait 30 minutes after washing their face before applying.  If too drying, patient may add a non-comedogenic moisturizer.  The most commonly reported side effects including irritation, redness, scaling, dryness, stinging, burning, itching, and increased risk of sunburn.  The patient verbalized understanding of the proper use and possible adverse effects of retinoids.  All of the patient's questions and concerns were addressed. Topical Sulfur Applications Pregnancy And Lactation Text: This medication is Pregnancy Category C and has an unknown safety profile during pregnancy. It is unknown if this topical medication is excreted in breast milk. High Dose Vitamin A Pregnancy And Lactation Text: High dose vitamin A therapy is contraindicated during pregnancy and breast feeding. Doxycycline Counseling:  Patient counseled regarding possible photosensitivity and increased risk for sunburn.  Patient instructed to avoid sunlight, if possible.  When exposed to sunlight, patients should wear protective clothing, sunglasses, and sunscreen.  The patient was instructed to call the office immediately if the following severe adverse effects occur:  hearing changes, easy bruising/bleeding, severe headache, or vision changes.  The patient verbalized understanding of the proper use and possible adverse effects of doxycycline.  All of the patient's questions and concerns were addressed. Topical Retinoid Pregnancy And Lactation Text: This medication is Pregnancy Category C. It is unknown if this medication is excreted in breast milk. Azelaic Acid Pregnancy And Lactation Text: This medication is considered safe during pregnancy and breast feeding. 77 y.o. female sp/ right total knee replacement 8/16/22. Erythromycin Pregnancy And Lactation Text: This medication is Pregnancy Category B and is considered safe during pregnancy. It is also excreted in breast milk. Birth Control Pills Counseling: Birth Control Pill Counseling: I discussed with the patient the potential side effects of OCPs including but not limited to increased risk of stroke, heart attack, thrombophlebitis, deep venous thrombosis, hepatic adenomas, breast changes, GI upset, headaches, and depression.  The patient verbalized understanding of the proper use and possible adverse effects of OCPs. All of the patient's questions and concerns were addressed. Sarecycline Counseling: Patient advised regarding possible photosensitivity and discoloration of the teeth, skin, lips, tongue and gums.  Patient instructed to avoid sunlight, if possible.  When exposed to sunlight, patients should wear protective clothing, sunglasses, and sunscreen.  The patient was instructed to call the office immediately if the following severe adverse effects occur:  hearing changes, easy bruising/bleeding, severe headache, or vision changes.  The patient verbalized understanding of the proper use and possible adverse effects of sarecycline.  All of the patient's questions and concerns were addressed. Benzoyl Peroxide Pregnancy And Lactation Text: This medication is Pregnancy Category C. It is unknown if benzoyl peroxide is excreted in breast milk. Isotretinoin Pregnancy And Lactation Text: This medication is Pregnancy Category X and is considered extremely dangerous during pregnancy. It is unknown if it is excreted in breast milk. Dapsone Counseling: I discussed with the patient the risks of dapsone including but not limited to hemolytic anemia, agranulocytosis, rashes, methemoglobinemia, kidney failure, peripheral neuropathy, headaches, GI upset, and liver toxicity.  Patients who start dapsone require monitoring including baseline LFTs and weekly CBCs for the first month, then every month thereafter.  The patient verbalized understanding of the proper use and possible adverse effects of dapsone.  All of the patient's questions and concerns were addressed. Spironolactone Counseling: Patient advised regarding risks of diarrhea, abdominal pain, hyperkalemia, birth defects (for female patients), liver toxicity and renal toxicity. The patient may need blood work to monitor liver and kidney function and potassium levels while on therapy. The patient verbalized understanding of the proper use and possible adverse effects of spironolactone.  All of the patient's questions and concerns were addressed. Tazorac Pregnancy And Lactation Text: This medication is not safe during pregnancy. It is unknown if this medication is excreted in breast milk. Detail Level: Detailed Minocycline Counseling: Patient advised regarding possible photosensitivity and discoloration of the teeth, skin, lips, tongue and gums.  Patient instructed to avoid sunlight, if possible.  When exposed to sunlight, patients should wear protective clothing, sunglasses, and sunscreen.  The patient was instructed to call the office immediately if the following severe adverse effects occur:  hearing changes, easy bruising/bleeding, severe headache, or vision changes.  The patient verbalized understanding of the proper use and possible adverse effects of minocycline.  All of the patient's questions and concerns were addressed. Topical Retinoid counseling:  Patient advised to apply a pea-sized amount only at bedtime and wait 30 minutes after washing their face before applying.  If too drying, patient may add a non-comedogenic moisturizer. The patient verbalized understanding of the proper use and possible adverse effects of retinoids.  All of the patient's questions and concerns were addressed. High Dose Vitamin A Counseling: Side effects reviewed, pt to contact office should one occur. Azithromycin Counseling:  I discussed with the patient the risks of azithromycin including but not limited to GI upset, allergic reaction, drug rash, diarrhea, and yeast infections. Dapsone Pregnancy And Lactation Text: This medication is Pregnancy Category C and is not considered safe during pregnancy or breast feeding. Aklief Pregnancy And Lactation Text: It is unknown if this medication is safe to use during pregnancy.  It is unknown if this medication is excreted in breast milk.  Breastfeeding women should use the topical cream on the smallest area of the skin for the shortest time needed while breastfeeding.  Do not apply to nipple and areola. Bactrim Counseling:  I discussed with the patient the risks of sulfa antibiotics including but not limited to GI upset, allergic reaction, drug rash, diarrhea, dizziness, photosensitivity, and yeast infections.  Rarely, more serious reactions can occur including but not limited to aplastic anemia, agranulocytosis, methemoglobinemia, blood dyscrasias, liver or kidney failure, lung infiltrates or desquamative/blistering drug rashes. Doxycycline Pregnancy And Lactation Text: This medication is Pregnancy Category D and not consider safe during pregnancy. It is also excreted in breast milk but is considered safe for shorter treatment courses. Topical Sulfur Applications Counseling: Topical Sulfur Counseling: Patient counseled that this medication may cause skin irritation or allergic reactions.  In the event of skin irritation, the patient was advised to reduce the amount of the drug applied or use it less frequently.   The patient verbalized understanding of the proper use and possible adverse effects of topical sulfur application.  All of the patient's questions and concerns were addressed. 77 y.o. female sp/ right total knee replacement 8/15/22.

## 2022-11-22 ENCOUNTER — APPOINTMENT (OUTPATIENT)
Dept: ORTHOPEDIC SURGERY | Facility: CLINIC | Age: 77
End: 2022-11-22

## 2022-11-22 VITALS — DIASTOLIC BLOOD PRESSURE: 86 MMHG | SYSTOLIC BLOOD PRESSURE: 121 MMHG | HEART RATE: 98 BPM

## 2022-11-22 PROCEDURE — 73562 X-RAY EXAM OF KNEE 3: CPT | Mod: RT

## 2022-11-22 PROCEDURE — 99213 OFFICE O/P EST LOW 20 MIN: CPT

## 2022-11-30 NOTE — H&P PST ADULT - PATIENT ON (OXYGEN DELIVERY METHOD)
GENERAL ANESTHESIA OR SEDATION ADULT DISCHARGE INSTRUCTIONS   SPECIAL PRECAUTIONS FOR 24 HOURS AFTER SURGERY    IT IS NOT UNUSUAL TO FEEL LIGHT-HEADED OR FAINT, UP TO 24 HOURS AFTER SURGERY OR WHILE TAKING PAIN MEDICATION.  IF YOU HAVE THESE SYMPTOMS; SIT FOR A FEW MINUTES BEFORE STANDING AND HAVE SOMEONE ASSIST YOU WHEN YOU GET UP TO WALK OR USE THE BATHROOM.    YOU SHOULD REST AND RELAX FOR THE NEXT 24 HOURS AND YOU MUST MAKE ARRANGEMENTS TO HAVE SOMEONE STAY WITH YOU FOR AT LEAST 24 HOURS AFTER YOUR DISCHARGE.  AVOID HAZARDOUS AND STRENUOUS ACTIVITIES.  DO NOT MAKE IMPORTANT DECISIONS FOR 24 HOURS.    DO NOT DRIVE ANY VEHICLE OR OPERATE MECHANICAL EQUIPMENT FOR 24 HOURS FOLLOWING THE END OF YOUR SURGERY.  EVEN THOUGH YOU MAY FEEL NORMAL, YOUR REACTIONS MAY BE AFFECTED BY THE MEDICATION YOU HAVE RECEIVED.    DO NOT DRINK ALCOHOLIC BEVERAGES FOR 24 HOURS FOLLOWING YOUR SURGERY.    DRINK CLEAR LIQUIDS (APPLE JUICE, GINGER ALE, 7-UP, BROTH, ETC.).  PROGRESS TO YOUR REGULAR DIET AS YOU FEEL ABLE.    YOU MAY HAVE A DRY MOUTH, A SORE THROAT, MUSCLES ACHES OR TROUBLE SLEEPING.  THESE SHOULD GO AWAY AFTER 24 HOURS.    CALL YOUR DOCTOR FOR ANY OF THE FOLLOWING:  SIGNS OF INFECTION (FEVER, GROWING TENDERNESS AT THE SURGERY SITE, A LARGE AMOUNT OF DRAINAGE OR BLEEDING, SEVERE PAIN, FOUL-SMELLING DRAINAGE, REDNESS OR SWELLING.    IT HAS BEEN OVER 8 TO 10 HOURS SINCE SURGERY AND YOU ARE STILL NOT ABLE TO URINATE (PASS WATER).      You received 975 mg acetaminophen (Tylenol) at 11:00 am. Wait until after 5:00 pm before taking any additional acetaminophen (Tylenol). Maximum acetaminophen (Tylenol) dose from all sources should not exceed 4 grams (4000 mg) per day.    You received Toradol, an IV form of Ibuprofen (Motrin) at 2:00 pm.  Do not take any Ibuprofen products until after 8:00 pm. Do not exceed 2,400 mg of Ibuprofen (Motrin) per day.     Dr. Terry [facility]: [phone]  
room air

## 2023-01-31 ENCOUNTER — APPOINTMENT (OUTPATIENT)
Dept: GASTROENTEROLOGY | Facility: CLINIC | Age: 78
End: 2023-01-31
Payer: MEDICARE

## 2023-01-31 VITALS
HEIGHT: 61.5 IN | DIASTOLIC BLOOD PRESSURE: 102 MMHG | BODY MASS INDEX: 25.16 KG/M2 | WEIGHT: 135 LBS | HEART RATE: 75 BPM | SYSTOLIC BLOOD PRESSURE: 134 MMHG

## 2023-01-31 PROCEDURE — 99214 OFFICE O/P EST MOD 30 MIN: CPT

## 2023-01-31 NOTE — ASSESSMENT
[FreeTextEntry1] : Plan:\par Reviewed all previous imaging, noted slight increase in left sided adrenal mass. Recommend pt follow up with oncologist who previously was monitoring size, Dr. melgar for dedicated imaging and plan moving forward. Pt and  expressed understanding, all questions answered. Discussed with Dr. Almanza, I have spent 30 minutes on this encounter.

## 2023-01-31 NOTE — PHYSICAL EXAM
[Alert] : alert [Healthy Appearing] : healthy appearing [Sclera] : the sclera and conjunctiva were normal [Abnormal Walk] : normal gait [Normal Color / Pigmentation] : normal skin color and pigmentation [Oriented To Time, Place, And Person] : oriented to person, place, and time

## 2023-01-31 NOTE — HISTORY OF PRESENT ILLNESS
[FreeTextEntry1] : Lesa Maynard is a 77 year old female presenting today with her  for follow up for adrenal mass. Pt was recently evaluated by new PCP who reviewed all old records, indicating left adrenal mass. Per records, mass has been observed since 2018, however on more recent chest CT there is concern for growth so pt was sent here. Pt denies complaints such as abdominal pain, nausea, vomiting, unintentional weight loss. Has been tolerating PO intake without difficulty or dysphagia.

## 2023-04-26 NOTE — H&P PST ADULT - VENOUS THROMBOEMBOLISM CURRENT STATUS
(0) indicator not present Cheiloplasty (Less Than 50%) Text: A decision was made to reconstruct the defect with a  cheiloplasty.  The defect was undermined extensively.  Additional orbicularis oris muscle was excised with a 15 blade scalpel.  The defect was converted into a full thickness wedge, of less than 50% of the vertical height of the lip, to facilite a better cosmetic result.  Small vessels were then tied off with 5-0 monocyrl. The orbicularis oris, superficial fascia, adipose and dermis were then reapproximated.  After the deeper layers were approximated the epidermis was reapproximated with particular care given to realign the vermilion border.

## 2023-05-05 NOTE — PHYSICAL THERAPY INITIAL EVALUATION ADULT - PLANNED THERAPY INTERVENTIONS, PT EVAL
bed mobility training/gait training/transfer training
Possible Home/Possible Skilled Nursing Facilty (SNF)

## 2023-05-11 ENCOUNTER — APPOINTMENT (OUTPATIENT)
Dept: GASTROENTEROLOGY | Facility: CLINIC | Age: 78
End: 2023-05-11
Payer: MEDICARE

## 2023-05-11 VITALS
SYSTOLIC BLOOD PRESSURE: 124 MMHG | WEIGHT: 130 LBS | DIASTOLIC BLOOD PRESSURE: 89 MMHG | HEIGHT: 61.5 IN | HEART RATE: 61 BPM | BODY MASS INDEX: 24.23 KG/M2

## 2023-05-11 DIAGNOSIS — R05.9 COUGH, UNSPECIFIED: ICD-10-CM

## 2023-05-11 PROCEDURE — 99213 OFFICE O/P EST LOW 20 MIN: CPT

## 2023-05-11 NOTE — REVIEW OF SYSTEMS
[As Noted in HPI] : as noted in HPI [Abdominal Pain] : no abdominal pain [Vomiting] : no vomiting [Constipation] : no constipation [Diarrhea] : no diarrhea [Heartburn] : no heartburn [Melena (black stool)] : no melena [Bleeding] : no bleeding [Fecal Incontinence (soiling)] : no fecal incontinence [Bloating (gassiness)] : no bloating [Negative] : Heme/Lymph

## 2023-05-11 NOTE — HISTORY OF PRESENT ILLNESS
[FreeTextEntry1] : Lesa Maynard is a 77 year old female presenting today with her  for complaints of coughing/possible dysphagia. Pt reports her daughter is SLP and noticed after she eats, she coughs sometimes. pt denies overt nausea, vomiting, dysphagia. has been tolerating PO intake without difficulty, denies unintentional weight loss. Reports she was having diarrhea a few weeks ago, but later was diagnosed with COVID so presumed it was related since it has since resolved. Denies bleeding associated such as melena or hematochezia.

## 2023-05-16 ENCOUNTER — EMERGENCY (EMERGENCY)
Facility: HOSPITAL | Age: 78
LOS: 0 days | Discharge: HOME | End: 2023-05-16
Attending: FAMILY MEDICINE
Payer: MEDICARE

## 2023-05-16 VITALS
DIASTOLIC BLOOD PRESSURE: 63 MMHG | HEART RATE: 89 BPM | TEMPERATURE: 98 F | RESPIRATION RATE: 20 BRPM | OXYGEN SATURATION: 100 % | SYSTOLIC BLOOD PRESSURE: 116 MMHG

## 2023-05-16 VITALS — WEIGHT: 130.07 LBS | HEIGHT: 61 IN

## 2023-05-16 DIAGNOSIS — S22.42XA MULTIPLE FRACTURES OF RIBS, LEFT SIDE, INITIAL ENCOUNTER FOR CLOSED FRACTURE: ICD-10-CM

## 2023-05-16 DIAGNOSIS — M81.0 AGE-RELATED OSTEOPOROSIS WITHOUT CURRENT PATHOLOGICAL FRACTURE: ICD-10-CM

## 2023-05-16 DIAGNOSIS — Z79.01 LONG TERM (CURRENT) USE OF ANTICOAGULANTS: ICD-10-CM

## 2023-05-16 DIAGNOSIS — I10 ESSENTIAL (PRIMARY) HYPERTENSION: ICD-10-CM
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
## 2023-05-16 DIAGNOSIS — Z98.890 OTHER SPECIFIED POSTPROCEDURAL STATES: Chronic | ICD-10-CM

## 2023-05-16 DIAGNOSIS — Z98.1 ARTHRODESIS STATUS: Chronic | ICD-10-CM

## 2023-05-16 DIAGNOSIS — Z88.5 ALLERGY STATUS TO NARCOTIC AGENT: ICD-10-CM

## 2023-05-16 DIAGNOSIS — Z82.61 FAMILY HISTORY OF ARTHRITIS: ICD-10-CM

## 2023-05-16 DIAGNOSIS — I48.91 UNSPECIFIED ATRIAL FIBRILLATION: ICD-10-CM

## 2023-05-16 DIAGNOSIS — I77.0 ARTERIOVENOUS FISTULA, ACQUIRED: ICD-10-CM

## 2023-05-16 DIAGNOSIS — E03.9 HYPOTHYROIDISM, UNSPECIFIED: ICD-10-CM

## 2023-05-16 DIAGNOSIS — E86.0 DEHYDRATION: ICD-10-CM

## 2023-05-16 DIAGNOSIS — R00.0 TACHYCARDIA, UNSPECIFIED: ICD-10-CM

## 2023-05-16 DIAGNOSIS — M41.9 SCOLIOSIS, UNSPECIFIED: ICD-10-CM

## 2023-05-16 DIAGNOSIS — U07.1 COVID-19: ICD-10-CM

## 2023-05-16 DIAGNOSIS — F03.90 UNSPECIFIED DEMENTIA WITHOUT BEHAVIORAL DISTURBANCE: ICD-10-CM

## 2023-05-16 DIAGNOSIS — Z87.39 PERSONAL HISTORY OF OTHER DISEASES OF THE MUSCULOSKELETAL SYSTEM AND CONNECTIVE TISSUE: ICD-10-CM

## 2023-05-16 DIAGNOSIS — R53.83 OTHER FATIGUE: ICD-10-CM

## 2023-05-16 DIAGNOSIS — R53.1 WEAKNESS: ICD-10-CM

## 2023-05-16 DIAGNOSIS — R09.81 NASAL CONGESTION: ICD-10-CM

## 2023-05-16 DIAGNOSIS — Z90.711 ACQUIRED ABSENCE OF UTERUS WITH REMAINING CERVICAL STUMP: Chronic | ICD-10-CM

## 2023-05-16 DIAGNOSIS — Z90.89 ACQUIRED ABSENCE OF OTHER ORGANS: Chronic | ICD-10-CM

## 2023-05-16 DIAGNOSIS — M17.11 UNILATERAL PRIMARY OSTEOARTHRITIS, RIGHT KNEE: ICD-10-CM

## 2023-05-16 DIAGNOSIS — Z90.49 ACQUIRED ABSENCE OF OTHER SPECIFIED PARTS OF DIGESTIVE TRACT: Chronic | ICD-10-CM
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
## 2023-05-16 LAB
ADD ON TEST-SPECIMEN IN LAB: SIGNIFICANT CHANGE UP
ALBUMIN SERPL ELPH-MCNC: 3.6 G/DL — SIGNIFICANT CHANGE UP (ref 3.3–5)
ALP SERPL-CCNC: 157 U/L — HIGH (ref 40–120)
ALT FLD-CCNC: 63 U/L — SIGNIFICANT CHANGE UP (ref 12–78)
ANION GAP SERPL CALC-SCNC: 11 MMOL/L — SIGNIFICANT CHANGE UP (ref 5–17)
ANION GAP SERPL CALC-SCNC: 7 MMOL/L — SIGNIFICANT CHANGE UP (ref 5–17)
APTT BLD: 47.5 SEC — HIGH (ref 27.5–35.5)
AST SERPL-CCNC: 46 U/L — HIGH (ref 15–37)
BASOPHILS # BLD AUTO: 0 K/UL — SIGNIFICANT CHANGE UP (ref 0–0.2)
BASOPHILS NFR BLD AUTO: 0 % — SIGNIFICANT CHANGE UP (ref 0–2)
BILIRUB SERPL-MCNC: 1.3 MG/DL — HIGH (ref 0.2–1.2)
BLD GP AB SCN SERPL QL: SIGNIFICANT CHANGE UP
BUN SERPL-MCNC: 29 MG/DL — HIGH (ref 7–23)
BUN SERPL-MCNC: 32 MG/DL — HIGH (ref 7–23)
CALCIUM SERPL-MCNC: 8.2 MG/DL — LOW (ref 8.5–10.1)
CALCIUM SERPL-MCNC: 9.8 MG/DL — SIGNIFICANT CHANGE UP (ref 8.5–10.1)
CHLORIDE SERPL-SCNC: 101 MMOL/L — SIGNIFICANT CHANGE UP (ref 96–108)
CHLORIDE SERPL-SCNC: 94 MMOL/L — LOW (ref 96–108)
CO2 SERPL-SCNC: 18 MMOL/L — LOW (ref 22–31)
CO2 SERPL-SCNC: 26 MMOL/L — SIGNIFICANT CHANGE UP (ref 22–31)
CREAT SERPL-MCNC: 1.38 MG/DL — HIGH (ref 0.5–1.3)
CREAT SERPL-MCNC: 1.71 MG/DL — HIGH (ref 0.5–1.3)
D DIMER BLD IA.RAPID-MCNC: 296 NG/ML DDU — HIGH
EGFR: 30 ML/MIN/1.73M2 — LOW
EGFR: 39 ML/MIN/1.73M2 — LOW
EOSINOPHIL # BLD AUTO: 0 K/UL — SIGNIFICANT CHANGE UP (ref 0–0.5)
EOSINOPHIL NFR BLD AUTO: 0 % — SIGNIFICANT CHANGE UP (ref 0–6)
FLUAV AG NPH QL: SIGNIFICANT CHANGE UP
FLUBV AG NPH QL: SIGNIFICANT CHANGE UP
GLUCOSE SERPL-MCNC: 109 MG/DL — HIGH (ref 70–99)
GLUCOSE SERPL-MCNC: 111 MG/DL — HIGH (ref 70–99)
HCT VFR BLD CALC: 38.6 % — SIGNIFICANT CHANGE UP (ref 34.5–45)
HGB BLD-MCNC: 12.9 G/DL — SIGNIFICANT CHANGE UP (ref 11.5–15.5)
INR BLD: 1.98 RATIO — HIGH (ref 0.88–1.16)
LYMPHOCYTES # BLD AUTO: 0.15 K/UL — LOW (ref 1–3.3)
LYMPHOCYTES # BLD AUTO: 3 % — LOW (ref 13–44)
MAGNESIUM SERPL-MCNC: 1.6 MG/DL — SIGNIFICANT CHANGE UP (ref 1.6–2.6)
MANUAL SMEAR VERIFICATION: SIGNIFICANT CHANGE UP
MCHC RBC-ENTMCNC: 28.9 PG — SIGNIFICANT CHANGE UP (ref 27–34)
MCHC RBC-ENTMCNC: 33.4 GM/DL — SIGNIFICANT CHANGE UP (ref 32–36)
MCV RBC AUTO: 86.4 FL — SIGNIFICANT CHANGE UP (ref 80–100)
MONOCYTES # BLD AUTO: 0.41 K/UL — SIGNIFICANT CHANGE UP (ref 0–0.9)
MONOCYTES NFR BLD AUTO: 8 % — SIGNIFICANT CHANGE UP (ref 2–14)
NEUTROPHILS # BLD AUTO: 4.57 K/UL — SIGNIFICANT CHANGE UP (ref 1.8–7.4)
NEUTROPHILS NFR BLD AUTO: 79 % — HIGH (ref 43–77)
NEUTS BAND # BLD: 10 % — HIGH (ref 0–8)
NRBC # BLD: 0 /100 — SIGNIFICANT CHANGE UP (ref 0–0)
NRBC # BLD: SIGNIFICANT CHANGE UP /100 WBCS (ref 0–0)
PLAT MORPH BLD: NORMAL — SIGNIFICANT CHANGE UP
PLATELET # BLD AUTO: 468 K/UL — HIGH (ref 150–400)
POTASSIUM SERPL-MCNC: 3.1 MMOL/L — LOW (ref 3.5–5.3)
POTASSIUM SERPL-MCNC: 5.1 MMOL/L — SIGNIFICANT CHANGE UP (ref 3.5–5.3)
POTASSIUM SERPL-SCNC: 3.1 MMOL/L — LOW (ref 3.5–5.3)
POTASSIUM SERPL-SCNC: 5.1 MMOL/L — SIGNIFICANT CHANGE UP (ref 3.5–5.3)
PROT SERPL-MCNC: 7.9 GM/DL — SIGNIFICANT CHANGE UP (ref 6–8.3)
PROTHROM AB SERPL-ACNC: 23.1 SEC — HIGH (ref 10.5–13.4)
RBC # BLD: 4.47 M/UL — SIGNIFICANT CHANGE UP (ref 3.8–5.2)
RBC # FLD: 17.5 % — HIGH (ref 10.3–14.5)
RBC BLD AUTO: NORMAL — SIGNIFICANT CHANGE UP
RSV RNA NPH QL NAA+NON-PROBE: SIGNIFICANT CHANGE UP
SARS-COV-2 RNA SPEC QL NAA+PROBE: DETECTED
SODIUM SERPL-SCNC: 126 MMOL/L — LOW (ref 135–145)
SODIUM SERPL-SCNC: 131 MMOL/L — LOW (ref 135–145)
TROPONIN I, HIGH SENSITIVITY RESULT: 11.49 NG/L — SIGNIFICANT CHANGE UP
WBC # BLD: 5.13 K/UL — SIGNIFICANT CHANGE UP (ref 3.8–10.5)
WBC # FLD AUTO: 5.13 K/UL — SIGNIFICANT CHANGE UP (ref 3.8–10.5)

## 2023-05-16 PROCEDURE — 84484 ASSAY OF TROPONIN QUANT: CPT

## 2023-05-16 PROCEDURE — 86901 BLOOD TYPING SEROLOGIC RH(D): CPT

## 2023-05-16 PROCEDURE — 0241U: CPT

## 2023-05-16 PROCEDURE — 71275 CT ANGIOGRAPHY CHEST: CPT | Mod: 26,MA

## 2023-05-16 PROCEDURE — 86900 BLOOD TYPING SEROLOGIC ABO: CPT

## 2023-05-16 PROCEDURE — 86850 RBC ANTIBODY SCREEN: CPT

## 2023-05-16 PROCEDURE — 80053 COMPREHEN METABOLIC PANEL: CPT

## 2023-05-16 PROCEDURE — 83735 ASSAY OF MAGNESIUM: CPT

## 2023-05-16 PROCEDURE — 99285 EMERGENCY DEPT VISIT HI MDM: CPT

## 2023-05-16 PROCEDURE — 80048 BASIC METABOLIC PNL TOTAL CA: CPT

## 2023-05-16 PROCEDURE — 71275 CT ANGIOGRAPHY CHEST: CPT | Mod: MA

## 2023-05-16 PROCEDURE — 99285 EMERGENCY DEPT VISIT HI MDM: CPT | Mod: 25

## 2023-05-16 PROCEDURE — 93010 ELECTROCARDIOGRAM REPORT: CPT

## 2023-05-16 PROCEDURE — 85379 FIBRIN DEGRADATION QUANT: CPT

## 2023-05-16 PROCEDURE — 85025 COMPLETE CBC W/AUTO DIFF WBC: CPT

## 2023-05-16 PROCEDURE — 71045 X-RAY EXAM CHEST 1 VIEW: CPT | Mod: 26

## 2023-05-16 PROCEDURE — 36415 COLL VENOUS BLD VENIPUNCTURE: CPT

## 2023-05-16 PROCEDURE — 85730 THROMBOPLASTIN TIME PARTIAL: CPT

## 2023-05-16 PROCEDURE — 71045 X-RAY EXAM CHEST 1 VIEW: CPT

## 2023-05-16 PROCEDURE — 85610 PROTHROMBIN TIME: CPT

## 2023-05-16 PROCEDURE — 93005 ELECTROCARDIOGRAM TRACING: CPT

## 2023-05-16 RX ORDER — SODIUM CHLORIDE 9 MG/ML
1000 INJECTION INTRAMUSCULAR; INTRAVENOUS; SUBCUTANEOUS ONCE
Refills: 0 | Status: COMPLETED | OUTPATIENT
Start: 2023-05-16 | End: 2023-05-16

## 2023-05-16 RX ORDER — POTASSIUM CHLORIDE 20 MEQ
40 PACKET (EA) ORAL ONCE
Refills: 0 | Status: COMPLETED | OUTPATIENT
Start: 2023-05-16 | End: 2023-05-16

## 2023-05-16 RX ADMIN — SODIUM CHLORIDE 1000 MILLILITER(S): 9 INJECTION INTRAMUSCULAR; INTRAVENOUS; SUBCUTANEOUS at 15:47

## 2023-05-16 RX ADMIN — Medication 40 MILLIEQUIVALENT(S): at 17:06

## 2023-05-16 RX ADMIN — SODIUM CHLORIDE 1000 MILLILITER(S): 9 INJECTION INTRAMUSCULAR; INTRAVENOUS; SUBCUTANEOUS at 17:57

## 2023-05-16 NOTE — ED PROVIDER NOTE - OBJECTIVE STATEMENT
76 y/o F with a PMHx of insomnia, dementia, anxiety, depression, osteporosis, hypothyroid, spondylolisthesis, spinal stenosis, scoliosis, UC, lumbar disc disorder, OA, HTN, and afib on eliquis presents to the ED c/o worsening fatigue x 2 weeks. pt tested positive for COVID in an at home test 2 weeks ago and has been having worsening fatigue and weakness. While going up ramp, the pt was too weak to continue and slowly made her way to the ground. contrary to what was stated on the triage note, the pt did not have a syncopal episode. Endorses cough and congestion, but no fever.

## 2023-05-16 NOTE — ED PROVIDER NOTE - CLINICAL SUMMARY MEDICAL DECISION MAKING FREE TEXT BOX
pt with hx of afib, UC, and HTN dx with COVID 2 weeks ago, having chest congestion c/o increased fatigue since then. pt is tachycardic, will give IV fluids for hypotension although pt appears clinically alert and stable, labs, including ddimer, probably give cardizem after bp increase.

## 2023-05-16 NOTE — ED PROVIDER NOTE - PROGRESS NOTE DETAILS
Mariel Gonzalez   discussed results with  pt is in [facility] for Dr. Gonzalez   fx ribs noted on ct scan pt states after coming back from trip early May pt fell and injured ribs   was seen by ortho and was told there were no fx. No difficulty breathing Received call from Dr. RICK French who requests that pt be admitted. He saw pt today and she was very weak. pt offered admission. She states she feels much better after fluids and wants to go home. Yumiko MAGAÑA

## 2023-05-16 NOTE — ED PROVIDER NOTE - PATIENT PORTAL LINK FT
You can access the FollowMyHealth Patient Portal offered by [facility] by registering at the following website: [email or web address]. By joining [facility]’s FollowMyHealth portal, you will also be able to view your health information using other applications (apps) compatible with our system.

## 2023-05-16 NOTE — ED ADULT NURSE NOTE - OBJECTIVE STATEMENT
pt arrived s/p internist visit c/o weakness, near syncope, fatigue. pt states she had to lower herself to the ground due to weakness. no distress noted in pt. pt diagnosed with covid 5/2. coloring appropriate .  pt denies shortness of breathe and chest pain

## 2023-05-16 NOTE — ED PROVIDER NOTE - CARE PLAN
1 Principal Discharge DX:	Rapid atrial fibrillation  Secondary Diagnosis:	Dehydration  Secondary Diagnosis:	2019 novel coronavirus disease (COVID-19)  Secondary Diagnosis:	Multiple rib fractures

## 2023-05-16 NOTE — ED ADULT TRIAGE NOTE - CHIEF COMPLAINT QUOTE
PT SENT FROM MD THOMSON'S OFFICE FOR RAPID AFIB 120S, HYPOTENSION, NEW COUGH AND SYNCOPY. +CHEST PAIN/UPPER SHOULDER PAIN 7/10.  COVID 5/2

## 2023-05-16 NOTE — ED ADULT NURSE NOTE - NSFALLUNIVINTERV_ED_ALL_ED
Bed/Stretcher in lowest position, wheels locked, appropriate side rails in place/Call bell, personal items and telephone in reach/Instruct patient to call for assistance before getting out of bed/chair/stretcher/Non-slip footwear applied when patient is off stretcher/[place] to call system/Physically safe environment - no spills, clutter or unnecessary equipment/Purposeful proactive rounding/Room/bathroom lighting operational, light cord in reach

## 2023-05-16 NOTE — ED ADULT NURSE REASSESSMENT NOTE - NS ED NURSE REASSESS COMMENT FT1
received pt from NADIR Yanes - as per pt and her  they were asked by MD Calderon if they wanted to go home - pt states they do want to go home - MD Calderon to d/c patient. no further complaints at this time - safety and comfort maintained.

## 2023-05-23 ENCOUNTER — INPATIENT (INPATIENT)
Facility: HOSPITAL | Age: 78
LOS: 1 days | Discharge: HOME | DRG: 640 | End: 2023-05-25
Attending: HOSPITALIST | Admitting: INTERNAL MEDICINE
Payer: MEDICARE

## 2023-05-23 VITALS
OXYGEN SATURATION: 100 % | SYSTOLIC BLOOD PRESSURE: 112 MMHG | RESPIRATION RATE: 18 BRPM | TEMPERATURE: 98 F | HEART RATE: 72 BPM | DIASTOLIC BLOOD PRESSURE: 77 MMHG

## 2023-05-23 DIAGNOSIS — Z98.890 OTHER SPECIFIED POSTPROCEDURAL STATES: Chronic | ICD-10-CM

## 2023-05-23 DIAGNOSIS — Z90.711 ACQUIRED ABSENCE OF UTERUS WITH REMAINING CERVICAL STUMP: Chronic | ICD-10-CM

## 2023-05-23 DIAGNOSIS — Z98.1 ARTHRODESIS STATUS: Chronic | ICD-10-CM

## 2023-05-23 DIAGNOSIS — Z90.49 ACQUIRED ABSENCE OF OTHER SPECIFIED PARTS OF DIGESTIVE TRACT: Chronic | ICD-10-CM

## 2023-05-23 DIAGNOSIS — Z90.89 ACQUIRED ABSENCE OF OTHER ORGANS: Chronic | ICD-10-CM

## 2023-05-23 LAB
ALBUMIN SERPL ELPH-MCNC: 3.1 G/DL — LOW (ref 3.3–5)
ALP SERPL-CCNC: 136 U/L — HIGH (ref 40–120)
ALT FLD-CCNC: 64 U/L — SIGNIFICANT CHANGE UP (ref 12–78)
ANION GAP SERPL CALC-SCNC: 9 MMOL/L — SIGNIFICANT CHANGE UP (ref 5–17)
APPEARANCE UR: CLEAR — SIGNIFICANT CHANGE UP
AST SERPL-CCNC: 60 U/L — HIGH (ref 15–37)
BACTERIA # UR AUTO: ABNORMAL
BASOPHILS # BLD AUTO: 0.04 K/UL — SIGNIFICANT CHANGE UP (ref 0–0.2)
BASOPHILS NFR BLD AUTO: 0.5 % — SIGNIFICANT CHANGE UP (ref 0–2)
BILIRUB SERPL-MCNC: 0.9 MG/DL — SIGNIFICANT CHANGE UP (ref 0.2–1.2)
BILIRUB UR-MCNC: NEGATIVE — SIGNIFICANT CHANGE UP
BUN SERPL-MCNC: 13 MG/DL — SIGNIFICANT CHANGE UP (ref 7–23)
CALCIUM SERPL-MCNC: 8.9 MG/DL — SIGNIFICANT CHANGE UP (ref 8.5–10.1)
CHLORIDE SERPL-SCNC: 92 MMOL/L — LOW (ref 96–108)
CO2 SERPL-SCNC: 26 MMOL/L — SIGNIFICANT CHANGE UP (ref 22–31)
COLOR SPEC: YELLOW — SIGNIFICANT CHANGE UP
CREAT SERPL-MCNC: 1.1 MG/DL — SIGNIFICANT CHANGE UP (ref 0.5–1.3)
DIFF PNL FLD: NEGATIVE — SIGNIFICANT CHANGE UP
EGFR: 51 ML/MIN/1.73M2 — LOW
EOSINOPHIL # BLD AUTO: 0.03 K/UL — SIGNIFICANT CHANGE UP (ref 0–0.5)
EOSINOPHIL NFR BLD AUTO: 0.4 % — SIGNIFICANT CHANGE UP (ref 0–6)
EPI CELLS # UR: NEGATIVE — SIGNIFICANT CHANGE UP
FLUAV AG NPH QL: SIGNIFICANT CHANGE UP
FLUBV AG NPH QL: SIGNIFICANT CHANGE UP
GLUCOSE BLDC GLUCOMTR-MCNC: 103 MG/DL — HIGH (ref 70–99)
GLUCOSE SERPL-MCNC: 94 MG/DL — SIGNIFICANT CHANGE UP (ref 70–99)
GLUCOSE UR QL: NEGATIVE — SIGNIFICANT CHANGE UP
HCT VFR BLD CALC: 37.2 % — SIGNIFICANT CHANGE UP (ref 34.5–45)
HGB BLD-MCNC: 12.2 G/DL — SIGNIFICANT CHANGE UP (ref 11.5–15.5)
IMM GRANULOCYTES NFR BLD AUTO: 1.4 % — HIGH (ref 0–0.9)
KETONES UR-MCNC: NEGATIVE — SIGNIFICANT CHANGE UP
LEUKOCYTE ESTERASE UR-ACNC: ABNORMAL
LG PLATELETS BLD QL AUTO: SIGNIFICANT CHANGE UP
LYMPHOCYTES # BLD AUTO: 0.28 K/UL — LOW (ref 1–3.3)
LYMPHOCYTES # BLD AUTO: 3.4 % — LOW (ref 13–44)
MAGNESIUM SERPL-MCNC: 1.5 MG/DL — LOW (ref 1.6–2.6)
MANUAL SMEAR VERIFICATION: SIGNIFICANT CHANGE UP
MCHC RBC-ENTMCNC: 28.9 PG — SIGNIFICANT CHANGE UP (ref 27–34)
MCHC RBC-ENTMCNC: 32.8 GM/DL — SIGNIFICANT CHANGE UP (ref 32–36)
MCV RBC AUTO: 88.2 FL — SIGNIFICANT CHANGE UP (ref 80–100)
MONOCYTES # BLD AUTO: 0.58 K/UL — SIGNIFICANT CHANGE UP (ref 0–0.9)
MONOCYTES NFR BLD AUTO: 7 % — SIGNIFICANT CHANGE UP (ref 2–14)
NEUTROPHILS # BLD AUTO: 7.28 K/UL — SIGNIFICANT CHANGE UP (ref 1.8–7.4)
NEUTROPHILS NFR BLD AUTO: 87.3 % — HIGH (ref 43–77)
NITRITE UR-MCNC: NEGATIVE — SIGNIFICANT CHANGE UP
PH UR: 7 — SIGNIFICANT CHANGE UP (ref 5–8)
PLAT MORPH BLD: ABNORMAL
PLATELET # BLD AUTO: 536 K/UL — HIGH (ref 150–400)
PLATELET COUNT - ESTIMATE: ABNORMAL
POLYCHROMASIA BLD QL SMEAR: SLIGHT — SIGNIFICANT CHANGE UP
POTASSIUM SERPL-MCNC: 3.8 MMOL/L — SIGNIFICANT CHANGE UP (ref 3.5–5.3)
POTASSIUM SERPL-SCNC: 3.8 MMOL/L — SIGNIFICANT CHANGE UP (ref 3.5–5.3)
PROT SERPL-MCNC: 6.9 GM/DL — SIGNIFICANT CHANGE UP (ref 6–8.3)
PROT UR-MCNC: NEGATIVE — SIGNIFICANT CHANGE UP
RBC # BLD: 4.22 M/UL — SIGNIFICANT CHANGE UP (ref 3.8–5.2)
RBC # FLD: 17.8 % — HIGH (ref 10.3–14.5)
RBC BLD AUTO: ABNORMAL
RBC CASTS # UR COMP ASSIST: SIGNIFICANT CHANGE UP /HPF (ref 0–4)
RSV RNA NPH QL NAA+NON-PROBE: SIGNIFICANT CHANGE UP
SARS-COV-2 RNA SPEC QL NAA+PROBE: DETECTED
SODIUM SERPL-SCNC: 127 MMOL/L — LOW (ref 135–145)
SP GR SPEC: 1 — LOW (ref 1.01–1.02)
TROPONIN I, HIGH SENSITIVITY RESULT: 7.74 NG/L — SIGNIFICANT CHANGE UP
UROBILINOGEN FLD QL: NEGATIVE — SIGNIFICANT CHANGE UP
WBC # BLD: 8.33 K/UL — SIGNIFICANT CHANGE UP (ref 3.8–10.5)
WBC # FLD AUTO: 8.33 K/UL — SIGNIFICANT CHANGE UP (ref 3.8–10.5)
WBC UR QL: SIGNIFICANT CHANGE UP /HPF (ref 0–5)

## 2023-05-23 PROCEDURE — 93010 ELECTROCARDIOGRAM REPORT: CPT

## 2023-05-23 PROCEDURE — 71046 X-RAY EXAM CHEST 2 VIEWS: CPT | Mod: 26

## 2023-05-23 PROCEDURE — 74177 CT ABD & PELVIS W/CONTRAST: CPT | Mod: 26,MA

## 2023-05-23 PROCEDURE — 99285 EMERGENCY DEPT VISIT HI MDM: CPT | Mod: FS

## 2023-05-23 RX ORDER — MERCAPTOPURINE 50 MG/1
2 TABLET ORAL
Qty: 0 | Refills: 0 | DISCHARGE

## 2023-05-23 RX ORDER — SODIUM CHLORIDE 9 MG/ML
1000 INJECTION INTRAMUSCULAR; INTRAVENOUS; SUBCUTANEOUS ONCE
Refills: 0 | Status: COMPLETED | OUTPATIENT
Start: 2023-05-23 | End: 2023-05-23

## 2023-05-23 RX ORDER — DRONEDARONE 400 MG/1
0.5 TABLET, FILM COATED ORAL
Qty: 0 | Refills: 0 | DISCHARGE

## 2023-05-23 RX ORDER — LACTOBACILLUS ACIDOPHILUS 100MM CELL
1 CAPSULE ORAL
Qty: 0 | Refills: 0 | DISCHARGE

## 2023-05-23 RX ORDER — MESALAMINE 400 MG
1 TABLET, DELAYED RELEASE (ENTERIC COATED) ORAL
Qty: 0 | Refills: 0 | DISCHARGE

## 2023-05-23 RX ORDER — ONDANSETRON 8 MG/1
4 TABLET, FILM COATED ORAL ONCE
Refills: 0 | Status: COMPLETED | OUTPATIENT
Start: 2023-05-23 | End: 2023-05-23

## 2023-05-23 RX ADMIN — SODIUM CHLORIDE 1000 MILLILITER(S): 9 INJECTION INTRAMUSCULAR; INTRAVENOUS; SUBCUTANEOUS at 18:40

## 2023-05-23 RX ADMIN — SODIUM CHLORIDE 2000 MILLILITER(S): 9 INJECTION INTRAMUSCULAR; INTRAVENOUS; SUBCUTANEOUS at 17:40

## 2023-05-23 NOTE — ED ADULT NURSE NOTE - NSFALLUNIVINTERV_ED_ALL_ED
Bed/Stretcher in lowest position, wheels locked, appropriate side rails in place/Call bell, personal items and telephone in reach/Instruct patient to call for assistance before getting out of bed/chair/stretcher/Non-slip footwear applied when patient is off stretcher/[place] to call system/Physically safe environment - no spills, clutter or unnecessary equipment/Purposeful proactive rounding/Room/bathroom lighting operational, light cord in reach

## 2023-05-23 NOTE — ED ADULT TRIAGE NOTE - BMI (KG/M2)
24.6
Instructions (Optional): Next SRT will continue with the regimen planned on 12/02/2019
Detail Level: Detailed

## 2023-05-23 NOTE — ED ADULT TRIAGE NOTE - PAIN: PRESENCE, MLM
CT SCAN OF THE ABDOMEN AND PELVIS WITH IV CONTRAST.

 

History:  Abdominal pain, no bowel movement for 3 days

 

Comparison: November 13, 2015.

 

Procedure: 

Contiguous axial images of the abdomen and pelvis were performed  after 

the administration of 75 cc of Omni 300 IV contrast and without oral 

contrast.

 

CT Abdomen with contrast:

 

Findings:

 

Liver: Unremarkable

Spleen: Unremarkable

Pancreas: Unremarkable

Adrenal Glands: Unremarkable

Kidneys: Unremarkable

 

There is no mass or lymphadenopathy.

 

There is no free air.  

 

There is no free fluid.

 

Impression:

 

 No acute findings.

 

End Impression

 

 

CT Pelvis with Contrast:

 

Findings:

 

The urinary bladder appears normal.

 

There is no free fluid.  

 

There is no lymphadenopathy.

 

There may have been prior hysterectomy. Urinary bladder sits low in the 

pelvis suggesting pelvic relaxation. This is unchanged.

 

The L2 vertebral body compression fracture seen present. The L3 and L4 

vertebral body compression fractures are not seen previously but appear 

old.

 

The appendix is not seen.

 

Impression:

 

Old vertebral body compression fractures. No acute findings.

 

PQRS Compliance Statement:

 

One or more of the following individualized dose reduction techniques were

utilized for this examination:  

1. Automated exposure control  

2. Adjustment of the mA and/or kV according to patient size  

3. Use of iterative reconstruction technique

 

Electronically signed by: Clark Dunn III, MD (9/30/2017 4:40 PM) [facility] complains of pain/discomfort

## 2023-05-23 NOTE — ED STATDOCS - ATTENDING APP SHARED VISIT CONTRIBUTION OF CARE
I, Trevon Trujillo MD,  performed the initial face to face bedside interview with this patient regarding history of present illness, review of symptoms and relevant past medical, social and family history.  I completed an independent physical examination.  I was the initial provider who evaluated this patient.   I personally saw the patient and performed a substantive portion of the visit including all aspects of the medical decision making.  I have signed out the follow up of any pending tests (i.e. labs, radiological studies) to the [facility].  I have communicated the patient’s plan of care and disposition with the DE[facility].  The history, relevant review of systems, past medical and surgical history, medical decision making, and physical examination was documented by the scribe in my presence and I attest to the accuracy of the documentation.

## 2023-05-23 NOTE — ED STATDOCS - OBJECTIVE STATEMENT
78 y/o female w/ PMHx of Afib on Eliquis, hypothyroid, HTN, recent COVID, anxiety, depression, osteoporosis, ulcerative colitis s/p partial colectomy w/ J pouch, h/o lumbar fusion L4-L5 presents to the ED w/  c/o worsening fatigue, weakness, congestion w/ green mucous x1 week. Pt was prescribed abx by Dr. French yesterday, took first doses yesterday, but now feels worse. Pt was here last week for dehydration and fatigue in the setting of COVID. Pt is now -COVID per . Denies bloody stools. pt w/ J pouch, states it has been draining normally. Pt with recent rib fx diagnosed on 5/2 s/p fall. 78 y/o female w/ PMHx of Afib on Eliquis, hypothyroid, HTN, recent COVID, anxiety, depression, osteoporosis, ulcerative colitis s/p partial colectomy w/ J pouch, h/o lumbar fusion L4-L5 presents to the ED w/  c/o worsening fatigue, weakness, congestion w/ green mucous x1 week. Pt was prescribed abx by Dr. French yesterday, took first doses yesterday, but now feels worse. Pt was here last week for dehydration and fatigue in the setting of COVID. Pt is now -COVID per . Denies bloody stools. pt w/ J pouch, states it has been draining normally. Pt with recent multiple rib fx diagnosed on 5/2 s/p fall. 76 y/o female w/ PMHx of Afib on Eliquis, hypothyroid, HTN, recent COVID, anxiety, depression, osteoporosis, ulcerative colitis s/p partial colectomy w/ J pouch, h/o lumbar fusion L4-L5 presents to the ED w/  c/o worsening fatigue, weakness, congestion w/ green mucous x1 week. Pt was prescribed abx by Dr. French yesterday, took first doses yesterday, but doesn't feel any better. Pt was here last week for dehydration and fatigue in the setting of COVID. Pt is now -COVID per . Denies bloody stools. Pt with recent multiple rib fx diagnosed on 5/2 s/p fall.

## 2023-05-23 NOTE — ED STATDOCS - PHYSICAL EXAMINATION
Constitutional: Awake, Alert, non-toxic. Appears fatigued  HEAD: Normocephalic, atraumatic.   EYES: PERRL, EOM intact, conjunctiva and sclera are clear bilaterally.  ENT: External ears normal. No rhinorrhea, no tracheal deviation   NECK: Supple, non-tender  CARDIOVASCULAR: regular rate and rhythm.  RESPIRATORY: Normal respiratory effort; breath sounds CTAB, no wheezes, rhonchi, or rales. Speaking in full sentences. No accessory muscle use.   ABDOMEN: Soft; non-tender, non-distended. No rebound or guarding.   MSK:  no lower extremity edema, no deformities  SKIN: Warm, dry  NEURO: A&O x3. Sensory and motor functions are grossly intact. Speech is normal. No facial droop.  PSYCH: Appearance and judgement seem appropriate for gender and age.

## 2023-05-23 NOTE — ED STATDOCS - PROGRESS NOTE DETAILS
78 y/o Female presents to ED c/o weakness, fatigue, nausea, decreased eating for 1 week.  Pt with recent Covid diagnosis on 5/3.  Per , pt initially had mild covid case.  Felt fatigued.  But now, pt's condition seems to have worsened.  Neg treatment for Covid with Paxlovid or IV meds.  Saw Dr. Elizalde yesterday and was prescribed Augmentin.  Pt took 2 doses, but reports feeling worse today.  Denies any GI bleeding.  Will f/U Labs, re-eval s/p meds. Likely plan to admit.  Delma Quan PA-C Covid (+) still today.  WBC 8.3, Hgb 12.2, Hct 37.2, Platelets elevated to 536.  Na low at 127, K+ 3.8, AST 60, ALT 64, Trop 7.74.  CXR today and CT chest imaging on 5/16, mention distended stomach , fluid filled.  I ordered CT abd/Pelvis for further evaluation.  Dr. French called ED to discuss pt with Covid.  Thinks pt with early dementia, covid will benefit form admission with IVF.  Social work consult for possible placement in Rehab as PMD believes pt may need additional services that family cannot provide.  Delma Quan PA-C

## 2023-05-23 NOTE — ED STATDOCS - CARE PLAN
1 Principal Discharge DX:	COVID-19  Secondary Diagnosis:	Hyponatremia  Secondary Diagnosis:	General weakness  Secondary Diagnosis:	Fatigue  Secondary Diagnosis:	Anorexia

## 2023-05-23 NOTE — ED STATDOCS - CLINICAL SUMMARY MEDICAL DECISION MAKING FREE TEXT BOX
76 y/o F w/ failure to thrive from home. Had recent ED visit showing hyponatremia and KALA. Will re-evaluate for recurrent changes. Possible PNA from recent COVID. Do not suspect ACS, though will check trop. Plan for labs, IV fluids, likely admission.

## 2023-05-23 NOTE — ED ADULT NURSE NOTE - OBJECTIVE STATEMENT
pt c/o weakness and fatigue on abx for cough and congestion. denies cp/sob/n/v/d/fever/chills. PMH AFIB, ON ELIQUIS, HBP.  During initial assessment, pt appears to be in no distress.

## 2023-05-23 NOTE — ED ADULT TRIAGE NOTE - CHIEF COMPLAINT QUOTE
pt c/o weakness and fatigue on abx for cough and congestion. denies cp/sob/n/v/d/fever/chills. PMH AFIB, ON ELIQUIS, HBP,

## 2023-05-24 DIAGNOSIS — U07.1 COVID-19: ICD-10-CM

## 2023-05-24 LAB
A1C WITH ESTIMATED AVERAGE GLUCOSE RESULT: 5.9 % — HIGH (ref 4–5.6)
ADD ON TEST-SPECIMEN IN LAB: SIGNIFICANT CHANGE UP
ALBUMIN SERPL ELPH-MCNC: 2.7 G/DL — LOW (ref 3.3–5)
ALP SERPL-CCNC: 124 U/L — HIGH (ref 40–120)
ALT FLD-CCNC: 55 U/L — SIGNIFICANT CHANGE UP (ref 12–78)
ANION GAP SERPL CALC-SCNC: 5 MMOL/L — SIGNIFICANT CHANGE UP (ref 5–17)
ANION GAP SERPL CALC-SCNC: 7 MMOL/L — SIGNIFICANT CHANGE UP (ref 5–17)
APTT BLD: 38.2 SEC — HIGH (ref 27.5–35.5)
AST SERPL-CCNC: 33 U/L — SIGNIFICANT CHANGE UP (ref 15–37)
BASOPHILS # BLD AUTO: 0.06 K/UL — SIGNIFICANT CHANGE UP (ref 0–0.2)
BASOPHILS NFR BLD AUTO: 0.9 % — SIGNIFICANT CHANGE UP (ref 0–2)
BILIRUB SERPL-MCNC: 0.7 MG/DL — SIGNIFICANT CHANGE UP (ref 0.2–1.2)
BUN SERPL-MCNC: 10 MG/DL — SIGNIFICANT CHANGE UP (ref 7–23)
BUN SERPL-MCNC: 10 MG/DL — SIGNIFICANT CHANGE UP (ref 7–23)
CALCIUM SERPL-MCNC: 8.4 MG/DL — LOW (ref 8.5–10.1)
CALCIUM SERPL-MCNC: 8.5 MG/DL — SIGNIFICANT CHANGE UP (ref 8.5–10.1)
CHLORIDE SERPL-SCNC: 102 MMOL/L — SIGNIFICANT CHANGE UP (ref 96–108)
CHLORIDE SERPL-SCNC: 98 MMOL/L — SIGNIFICANT CHANGE UP (ref 96–108)
CHOLEST SERPL-MCNC: 127 MG/DL — SIGNIFICANT CHANGE UP
CO2 SERPL-SCNC: 23 MMOL/L — SIGNIFICANT CHANGE UP (ref 22–31)
CO2 SERPL-SCNC: 26 MMOL/L — SIGNIFICANT CHANGE UP (ref 22–31)
CREAT SERPL-MCNC: 1.09 MG/DL — SIGNIFICANT CHANGE UP (ref 0.5–1.3)
CREAT SERPL-MCNC: 1.16 MG/DL — SIGNIFICANT CHANGE UP (ref 0.5–1.3)
EGFR: 48 ML/MIN/1.73M2 — LOW
EGFR: 52 ML/MIN/1.73M2 — LOW
EOSINOPHIL # BLD AUTO: 0.06 K/UL — SIGNIFICANT CHANGE UP (ref 0–0.5)
EOSINOPHIL NFR BLD AUTO: 0.9 % — SIGNIFICANT CHANGE UP (ref 0–6)
ESTIMATED AVERAGE GLUCOSE: 123 MG/DL — HIGH (ref 68–114)
GLUCOSE SERPL-MCNC: 83 MG/DL — SIGNIFICANT CHANGE UP (ref 70–99)
GLUCOSE SERPL-MCNC: 98 MG/DL — SIGNIFICANT CHANGE UP (ref 70–99)
HCT VFR BLD CALC: 30.7 % — LOW (ref 34.5–45)
HDLC SERPL-MCNC: 41 MG/DL — LOW
HGB BLD-MCNC: 9.9 G/DL — LOW (ref 11.5–15.5)
IMM GRANULOCYTES NFR BLD AUTO: 1.6 % — HIGH (ref 0–0.9)
INR BLD: 1.66 RATIO — HIGH (ref 0.88–1.16)
LACTATE SERPL-SCNC: 1.3 MMOL/L — SIGNIFICANT CHANGE UP (ref 0.7–2)
LIPID PNL WITH DIRECT LDL SERPL: 66 MG/DL — SIGNIFICANT CHANGE UP
LYMPHOCYTES # BLD AUTO: 0.25 K/UL — LOW (ref 1–3.3)
LYMPHOCYTES # BLD AUTO: 3.6 % — LOW (ref 13–44)
MAGNESIUM SERPL-MCNC: 1.6 MG/DL — SIGNIFICANT CHANGE UP (ref 1.6–2.6)
MAGNESIUM SERPL-MCNC: 1.6 MG/DL — SIGNIFICANT CHANGE UP (ref 1.6–2.6)
MCHC RBC-ENTMCNC: 29 PG — SIGNIFICANT CHANGE UP (ref 27–34)
MCHC RBC-ENTMCNC: 32.2 GM/DL — SIGNIFICANT CHANGE UP (ref 32–36)
MCV RBC AUTO: 90 FL — SIGNIFICANT CHANGE UP (ref 80–100)
MONOCYTES # BLD AUTO: 0.61 K/UL — SIGNIFICANT CHANGE UP (ref 0–0.9)
MONOCYTES NFR BLD AUTO: 8.9 % — SIGNIFICANT CHANGE UP (ref 2–14)
NEUTROPHILS # BLD AUTO: 5.8 K/UL — SIGNIFICANT CHANGE UP (ref 1.8–7.4)
NEUTROPHILS NFR BLD AUTO: 84.1 % — HIGH (ref 43–77)
NON HDL CHOLESTEROL: 86 MG/DL — SIGNIFICANT CHANGE UP
PHOSPHATE SERPL-MCNC: 2.5 MG/DL — SIGNIFICANT CHANGE UP (ref 2.5–4.5)
PHOSPHATE SERPL-MCNC: 2.8 MG/DL — SIGNIFICANT CHANGE UP (ref 2.5–4.5)
PLATELET # BLD AUTO: 389 K/UL — SIGNIFICANT CHANGE UP (ref 150–400)
POTASSIUM SERPL-MCNC: 2.9 MMOL/L — CRITICAL LOW (ref 3.5–5.3)
POTASSIUM SERPL-MCNC: 3.2 MMOL/L — LOW (ref 3.5–5.3)
POTASSIUM SERPL-SCNC: 2.9 MMOL/L — CRITICAL LOW (ref 3.5–5.3)
POTASSIUM SERPL-SCNC: 3.2 MMOL/L — LOW (ref 3.5–5.3)
PROCALCITONIN SERPL-MCNC: 0.12 NG/ML — HIGH (ref 0.02–0.1)
PROT SERPL-MCNC: 6 GM/DL — SIGNIFICANT CHANGE UP (ref 6–8.3)
PROTHROM AB SERPL-ACNC: 19.4 SEC — HIGH (ref 10.5–13.4)
RBC # BLD: 3.41 M/UL — LOW (ref 3.8–5.2)
RBC # FLD: 18 % — HIGH (ref 10.3–14.5)
SODIUM SERPL-SCNC: 129 MMOL/L — LOW (ref 135–145)
SODIUM SERPL-SCNC: 132 MMOL/L — LOW (ref 135–145)
TRIGL SERPL-MCNC: 100 MG/DL — SIGNIFICANT CHANGE UP
TROPONIN I, HIGH SENSITIVITY RESULT: 10.43 NG/L — SIGNIFICANT CHANGE UP
VIT B12 SERPL-MCNC: 489 PG/ML — SIGNIFICANT CHANGE UP (ref 232–1245)
WBC # BLD: 6.89 K/UL — SIGNIFICANT CHANGE UP (ref 3.8–10.5)
WBC # FLD AUTO: 6.89 K/UL — SIGNIFICANT CHANGE UP (ref 3.8–10.5)

## 2023-05-24 PROCEDURE — 85610 PROTHROMBIN TIME: CPT

## 2023-05-24 PROCEDURE — 83605 ASSAY OF LACTIC ACID: CPT

## 2023-05-24 PROCEDURE — 36415 COLL VENOUS BLD VENIPUNCTURE: CPT

## 2023-05-24 PROCEDURE — 85027 COMPLETE CBC AUTOMATED: CPT

## 2023-05-24 PROCEDURE — 97116 GAIT TRAINING THERAPY: CPT | Mod: GP

## 2023-05-24 PROCEDURE — 82607 VITAMIN B-12: CPT

## 2023-05-24 PROCEDURE — 70551 MRI BRAIN STEM W/O DYE: CPT

## 2023-05-24 PROCEDURE — 93010 ELECTROCARDIOGRAM REPORT: CPT

## 2023-05-24 PROCEDURE — 84100 ASSAY OF PHOSPHORUS: CPT

## 2023-05-24 PROCEDURE — 85730 THROMBOPLASTIN TIME PARTIAL: CPT

## 2023-05-24 PROCEDURE — 82803 BLOOD GASES ANY COMBINATION: CPT

## 2023-05-24 PROCEDURE — 83036 HEMOGLOBIN GLYCOSYLATED A1C: CPT

## 2023-05-24 PROCEDURE — 80061 LIPID PANEL: CPT

## 2023-05-24 PROCEDURE — 36600 WITHDRAWAL OF ARTERIAL BLOOD: CPT

## 2023-05-24 PROCEDURE — 80053 COMPREHEN METABOLIC PANEL: CPT

## 2023-05-24 PROCEDURE — 83735 ASSAY OF MAGNESIUM: CPT

## 2023-05-24 PROCEDURE — 84443 ASSAY THYROID STIM HORMONE: CPT

## 2023-05-24 PROCEDURE — 12345: CPT | Mod: NC

## 2023-05-24 PROCEDURE — 85025 COMPLETE CBC W/AUTO DIFF WBC: CPT

## 2023-05-24 PROCEDURE — 97162 PT EVAL MOD COMPLEX 30 MIN: CPT | Mod: GP

## 2023-05-24 PROCEDURE — 80048 BASIC METABOLIC PNL TOTAL CA: CPT

## 2023-05-24 PROCEDURE — 82140 ASSAY OF AMMONIA: CPT

## 2023-05-24 PROCEDURE — 70551 MRI BRAIN STEM W/O DYE: CPT | Mod: 26

## 2023-05-24 RX ORDER — POTASSIUM CHLORIDE 20 MEQ
40 PACKET (EA) ORAL EVERY 4 HOURS
Refills: 0 | Status: COMPLETED | OUTPATIENT
Start: 2023-05-24 | End: 2023-05-24

## 2023-05-24 RX ORDER — LANOLIN ALCOHOL/MO/W.PET/CERES
3 CREAM (GRAM) TOPICAL AT BEDTIME
Refills: 0 | Status: DISCONTINUED | OUTPATIENT
Start: 2023-05-24 | End: 2023-05-25

## 2023-05-24 RX ORDER — MERCAPTOPURINE 50 MG/1
50 TABLET ORAL AT BEDTIME
Refills: 0 | Status: DISCONTINUED | OUTPATIENT
Start: 2023-05-24 | End: 2023-05-25

## 2023-05-24 RX ORDER — THIAMINE MONONITRATE (VIT B1) 100 MG
100 TABLET ORAL DAILY
Refills: 0 | Status: DISCONTINUED | OUTPATIENT
Start: 2023-05-24 | End: 2023-05-25

## 2023-05-24 RX ORDER — DULOXETINE HYDROCHLORIDE 30 MG/1
60 CAPSULE, DELAYED RELEASE ORAL DAILY
Refills: 0 | Status: DISCONTINUED | OUTPATIENT
Start: 2023-05-24 | End: 2023-05-25

## 2023-05-24 RX ORDER — APIXABAN 2.5 MG/1
5 TABLET, FILM COATED ORAL ONCE
Refills: 0 | Status: COMPLETED | OUTPATIENT
Start: 2023-05-24 | End: 2023-05-24

## 2023-05-24 RX ORDER — SODIUM CHLORIDE 9 MG/ML
1000 INJECTION INTRAMUSCULAR; INTRAVENOUS; SUBCUTANEOUS
Refills: 0 | Status: DISCONTINUED | OUTPATIENT
Start: 2023-05-24 | End: 2023-05-25

## 2023-05-24 RX ORDER — LEVOTHYROXINE SODIUM 125 MCG
25 TABLET ORAL DAILY
Refills: 0 | Status: DISCONTINUED | OUTPATIENT
Start: 2023-05-24 | End: 2023-05-25

## 2023-05-24 RX ORDER — MULTIVIT-MIN/FERROUS GLUCONATE 9 MG/15 ML
1 LIQUID (ML) ORAL DAILY
Refills: 0 | Status: DISCONTINUED | OUTPATIENT
Start: 2023-05-24 | End: 2023-05-25

## 2023-05-24 RX ORDER — ACETAMINOPHEN 500 MG
650 TABLET ORAL EVERY 6 HOURS
Refills: 0 | Status: DISCONTINUED | OUTPATIENT
Start: 2023-05-24 | End: 2023-05-25

## 2023-05-24 RX ORDER — APIXABAN 2.5 MG/1
5 TABLET, FILM COATED ORAL EVERY 12 HOURS
Refills: 0 | Status: DISCONTINUED | OUTPATIENT
Start: 2023-05-24 | End: 2023-05-25

## 2023-05-24 RX ORDER — ONDANSETRON 8 MG/1
4 TABLET, FILM COATED ORAL EVERY 8 HOURS
Refills: 0 | Status: DISCONTINUED | OUTPATIENT
Start: 2023-05-24 | End: 2023-05-25

## 2023-05-24 RX ORDER — POTASSIUM CHLORIDE 20 MEQ
10 PACKET (EA) ORAL
Refills: 0 | Status: COMPLETED | OUTPATIENT
Start: 2023-05-24 | End: 2023-05-24

## 2023-05-24 RX ORDER — TRIAMTERENE/HYDROCHLOROTHIAZID 75 MG-50MG
1 TABLET ORAL DAILY
Refills: 0 | Status: DISCONTINUED | OUTPATIENT
Start: 2023-05-24 | End: 2023-05-25

## 2023-05-24 RX ORDER — SODIUM CHLORIDE 9 MG/ML
500 INJECTION INTRAMUSCULAR; INTRAVENOUS; SUBCUTANEOUS ONCE
Refills: 0 | Status: COMPLETED | OUTPATIENT
Start: 2023-05-24 | End: 2023-05-24

## 2023-05-24 RX ORDER — AMIODARONE HYDROCHLORIDE 400 MG/1
200 TABLET ORAL DAILY
Refills: 0 | Status: DISCONTINUED | OUTPATIENT
Start: 2023-05-24 | End: 2023-05-25

## 2023-05-24 RX ORDER — MAGNESIUM OXIDE 400 MG ORAL TABLET 241.3 MG
400 TABLET ORAL ONCE
Refills: 0 | Status: COMPLETED | OUTPATIENT
Start: 2023-05-24 | End: 2023-05-24

## 2023-05-24 RX ORDER — PIPERACILLIN AND TAZOBACTAM 4; .5 G/20ML; G/20ML
3.38 INJECTION, POWDER, LYOPHILIZED, FOR SOLUTION INTRAVENOUS ONCE
Refills: 0 | Status: COMPLETED | OUTPATIENT
Start: 2023-05-24 | End: 2023-05-24

## 2023-05-24 RX ORDER — MEMANTINE HYDROCHLORIDE 10 MG/1
10 TABLET ORAL
Refills: 0 | Status: DISCONTINUED | OUTPATIENT
Start: 2023-05-24 | End: 2023-05-25

## 2023-05-24 RX ORDER — METOPROLOL TARTRATE 50 MG
50 TABLET ORAL DAILY
Refills: 0 | Status: DISCONTINUED | OUTPATIENT
Start: 2023-05-24 | End: 2023-05-25

## 2023-05-24 RX ORDER — ATORVASTATIN CALCIUM 80 MG/1
20 TABLET, FILM COATED ORAL AT BEDTIME
Refills: 0 | Status: DISCONTINUED | OUTPATIENT
Start: 2023-05-24 | End: 2023-05-25

## 2023-05-24 RX ORDER — PIPERACILLIN AND TAZOBACTAM 4; .5 G/20ML; G/20ML
3.38 INJECTION, POWDER, LYOPHILIZED, FOR SOLUTION INTRAVENOUS EVERY 8 HOURS
Refills: 0 | Status: DISCONTINUED | OUTPATIENT
Start: 2023-05-24 | End: 2023-05-24

## 2023-05-24 RX ORDER — DULOXETINE HYDROCHLORIDE 30 MG/1
20 CAPSULE, DELAYED RELEASE ORAL DAILY
Refills: 0 | Status: DISCONTINUED | OUTPATIENT
Start: 2023-05-24 | End: 2023-05-25

## 2023-05-24 RX ADMIN — SODIUM CHLORIDE 1000 MILLILITER(S): 9 INJECTION INTRAMUSCULAR; INTRAVENOUS; SUBCUTANEOUS at 02:57

## 2023-05-24 RX ADMIN — ATORVASTATIN CALCIUM 20 MILLIGRAM(S): 80 TABLET, FILM COATED ORAL at 21:02

## 2023-05-24 RX ADMIN — Medication 100 MILLIEQUIVALENT(S): at 04:43

## 2023-05-24 RX ADMIN — MEMANTINE HYDROCHLORIDE 10 MILLIGRAM(S): 10 TABLET ORAL at 09:55

## 2023-05-24 RX ADMIN — MERCAPTOPURINE 50 MILLIGRAM(S): 50 TABLET ORAL at 21:03

## 2023-05-24 RX ADMIN — DULOXETINE HYDROCHLORIDE 20 MILLIGRAM(S): 30 CAPSULE, DELAYED RELEASE ORAL at 09:55

## 2023-05-24 RX ADMIN — APIXABAN 5 MILLIGRAM(S): 2.5 TABLET, FILM COATED ORAL at 02:36

## 2023-05-24 RX ADMIN — Medication 50 MILLIGRAM(S): at 09:56

## 2023-05-24 RX ADMIN — APIXABAN 5 MILLIGRAM(S): 2.5 TABLET, FILM COATED ORAL at 09:56

## 2023-05-24 RX ADMIN — Medication 100 MILLIEQUIVALENT(S): at 05:47

## 2023-05-24 RX ADMIN — DULOXETINE HYDROCHLORIDE 60 MILLIGRAM(S): 30 CAPSULE, DELAYED RELEASE ORAL at 09:55

## 2023-05-24 RX ADMIN — MAGNESIUM OXIDE 400 MG ORAL TABLET 400 MILLIGRAM(S): 241.3 TABLET ORAL at 04:43

## 2023-05-24 RX ADMIN — Medication 40 MILLIEQUIVALENT(S): at 18:13

## 2023-05-24 RX ADMIN — Medication 25 MICROGRAM(S): at 05:45

## 2023-05-24 RX ADMIN — MEMANTINE HYDROCHLORIDE 10 MILLIGRAM(S): 10 TABLET ORAL at 21:02

## 2023-05-24 RX ADMIN — Medication 100 MILLIEQUIVALENT(S): at 07:40

## 2023-05-24 RX ADMIN — Medication 40 MILLIEQUIVALENT(S): at 09:55

## 2023-05-24 RX ADMIN — AMIODARONE HYDROCHLORIDE 200 MILLIGRAM(S): 400 TABLET ORAL at 09:56

## 2023-05-24 RX ADMIN — Medication 1 TABLET(S): at 09:55

## 2023-05-24 RX ADMIN — APIXABAN 5 MILLIGRAM(S): 2.5 TABLET, FILM COATED ORAL at 21:03

## 2023-05-24 RX ADMIN — PIPERACILLIN AND TAZOBACTAM 200 GRAM(S): 4; .5 INJECTION, POWDER, LYOPHILIZED, FOR SOLUTION INTRAVENOUS at 03:03

## 2023-05-24 RX ADMIN — SODIUM CHLORIDE 100 MILLILITER(S): 9 INJECTION INTRAMUSCULAR; INTRAVENOUS; SUBCUTANEOUS at 02:57

## 2023-05-24 RX ADMIN — Medication 3 MILLIGRAM(S): at 21:02

## 2023-05-24 RX ADMIN — PIPERACILLIN AND TAZOBACTAM 25 GRAM(S): 4; .5 INJECTION, POWDER, LYOPHILIZED, FOR SOLUTION INTRAVENOUS at 09:26

## 2023-05-24 NOTE — H&P ADULT - NSHPREVIEWOFSYSTEMS_GEN_ALL_CORE
REVIEW OF SYSTEMS:    CONSTITUTIONAL: No weakness, fevers or chills  EYES/ENT: No visual changes;  No vertigo or throat pain   NECK: No pain or stiffness  RESPIRATORY: No cough, wheezing, hemoptysis; No shortness of breath  CARDIOVASCULAR: No chest pain or palpitations  GASTROINTESTINAL: No abdominal or epigastric pain. No nausea, vomiting, or hematemesis; No melena or hematochezia.  GENITOURINARY: No dysuria, frequency or hematuria  NEUROLOGICAL: No numbness or weakness  SKIN: No itching, rashes REVIEW OF SYSTEMS:    CONSTITUTIONAL: No weakness, fevers or chills  EYES/ENT: No visual changes;  No vertigo or throat pain   NECK: No pain or stiffness  RESPIRATORY: No cough, wheezing, hemoptysis; No shortness of breath  CARDIOVASCULAR: No chest pain or palpitations  GASTROINTESTINAL: No abdominal or epigastric pain. No nausea, vomiting, or hematemesis; No melena or hematochezia.  GENITOURINARY: No dysuria, frequency or hematuria  NEUROLOGICAL: No numbness or weakness  SKIN: No itching, rashes  MSK: recent rib fx as per HPI  PSYCH +memory impairment

## 2023-05-24 NOTE — PROVIDER CONTACT NOTE (CRITICAL VALUE NOTIFICATION) - SITUATION
Patient admitted with weakness, fatigue, nausea, decreased appetite, and positive for COVID.  Stat lab was done and result came back with critical value of K 2.9.

## 2023-05-24 NOTE — PROGRESS NOTE ADULT - NUTRITIONAL ASSESSMENT
This patient has been assessed with a concern for Malnutrition and has been determined to have a diagnosis/diagnoses of Severe protein-calorie malnutrition.    This patient is being managed with:   Diet Regular-  Supplement Feeding Modality:  Oral  Ensure Plus High Protein Cans or Servings Per Day:  1       Frequency:  Two Times a day  Entered: May 24 2023  5:20PM

## 2023-05-24 NOTE — PATIENT PROFILE ADULT - FALL HARM RISK - HARM RISK INTERVENTIONS
Communicate Risk of Fall with Harm to all staff/Reinforce activity limits and safety measures with patient and family/Tailored Fall Risk Interventions/Visual Cue: Yellow wristband and red socks/Bed in lowest position, wheels locked, appropriate side rails in place/Call bell, personal items and telephone in reach/Instruct patient to call for assistance before getting out of bed or chair/Non-slip footwear when patient is out of bed/[place] to call system/Physically safe environment - no spills, clutter or unnecessary equipment/Purposeful Proactive Rounding/Room/bathroom lighting operational, light cord in reach Assistance with ambulation/Assistance OOB with selected safe patient handling equipment/Communicate Risk of Fall with Harm to all staff/Reinforce activity limits and safety measures with patient and family/Tailored Fall Risk Interventions/Visual Cue: Yellow wristband and red socks/Bed in lowest position, wheels locked, appropriate side rails in place/Call bell, personal items and telephone in reach/Instruct patient to call for assistance before getting out of bed or chair/Non-slip footwear when patient is out of bed/[place] to call system/Physically safe environment - no spills, clutter or unnecessary equipment/Purposeful Proactive Rounding/Room/bathroom lighting operational, light cord in reach

## 2023-05-24 NOTE — PHYSICAL THERAPY INITIAL EVALUATION ADULT - PERTINENT HX OF CURRENT PROBLEM, REHAB EVAL
78 y/o female w/ PMHx of Afib on Eliquis, hypothyroid, HTN, recent COVID 5/2, anxiety, depression, osteoporosis, ulcerative colitis s/p total colectomy w/ J pouch, h/o lumbar fusion L4-L5 presented to the ED w/  c/o worsening fatigue, weakness, congestion w/ green mucous x1 week. Pt was here last week for dehydration and fatigue in the setting of COVID, Pt with recent multiple rib fx diagnosed on 5/2 s/p fall.She believes she has brain fog from her COVID diagnosis, because she has not been herself. she is prone to sundowning in the hospital.

## 2023-05-24 NOTE — CHART NOTE - NSCHARTNOTEFT_GEN_A_CORE
Called by RN with critical value of 2.9 Potassium  Discussed with hospitalist  Mag Ox 400mg x1, and K Cl 10 meq x5 IV   f/u am bmp  continue NS fluids, sodium improved

## 2023-05-24 NOTE — CONSULT NOTE ADULT - ASSESSMENT
76 y/o female with h/o A.fib on Eliquis, hypothyroidism, HTN, recent COVID-19 viral syndrome on 5/2, anxiety, depression, osteoporosis, ulcerative colitis s/p total colectomy w/ J pouch, lumbar fusion L4-L5 was admitted on 5/23 for worsening fatigue, weakness, congestion w/ green mucous x1 week. Pt was was seen and prescribed abx by Dr. French on the day PTA, took first doses yesterday, but doesn't feel any better. Patient denies any dyspnea, nausea, chest pain, headaches or vision changes at this time. She believes she has brain fog, because she has not been herself. She also says she was having a change in her bowel movement which has been more soft, however as per , her stool is baseline soft. As per , patient's memory issues have been an issue for the past 3 years and she has not had any acute change. As per , she is prone to sundowning in the hospital. In ER she received zosyn.     1. Ulcerative colitis s/p subtotal colectomy. Probable enteritis with ileus. Acute bronchitis. Recent COVID-19 viral syndrome. Immunocompromised host.   -no respiratory distress or hypoxia  -no clinical signs of sepsis   76 y/o female with h/o A.fib on Eliquis, hypothyroidism, HTN, recent COVID-19 viral syndrome on 5/2, anxiety, depression, osteoporosis, ulcerative colitis s/p total colectomy w/ J pouch, lumbar fusion L4-L5 was admitted on 5/23 for worsening fatigue, weakness, congestion w/ green mucous x1 week. Pt was was seen and prescribed abx by Dr. French on the day PTA, took first doses yesterday, but doesn't feel any better. Patient denies any dyspnea, nausea, chest pain, headaches or vision changes at this time. She believes she has brain fog, because she has not been herself. She also says she was having a change in her bowel movement which has been more soft, however as per , her stool is baseline soft. As per , patient's memory issues have been an issue for the past 3 years and she has not had any acute change. As per , she is prone to sundowning in the hospital. In ER she received zosyn.     1. Ulcerative colitis s/p subtotal colectomy. Probable enteritis with ileus. Acute bronchitis. Recent COVID-19 viral syndrome. Immunocompromised host.   -no respiratory distress or hypoxia  -no clinical signs of sepsis  -would observe off abx therapy at this time  -monitor abdomen  -old chart reviewed to assess prior cultures  -monitor temps  -f/u CBC  -supportive care  2. Other issues:   -care per medicine

## 2023-05-24 NOTE — PROGRESS NOTE ADULT - ATTENDING COMMENTS
78 y/o female w/ PMHx of Afib on Eliquis, hypothyroid, HTN, recent COVID 5/2, anxiety, depression, osteoporosis, ulcerative colitis s/p total colectomy w/ J pouch presenting with malaise    1. Ask patients many different levels of questions on memory. she knew hospital full name, date of birth able to read the clock and tell time  able to say what she ate for breakfast today. she also notes she did not eat lunch yesterday which is correct , she en route to hospital    no evidence of seizure /stroke  dehydration likely  plan; MRI and nutrition consult  observe off abx  CRC and ID consulted  replace daniele  hope to dc tomorrow

## 2023-05-24 NOTE — DIETITIAN INITIAL EVALUATION ADULT - PERTINENT MEDS FT
MEDICATIONS  (STANDING):  aMIOdarone    Tablet 200 milliGRAM(s) Oral daily  apixaban 5 milliGRAM(s) Oral every 12 hours  atorvastatin 20 milliGRAM(s) Oral at bedtime  DULoxetine 60 milliGRAM(s) Oral daily  DULoxetine 20 milliGRAM(s) Oral daily  levothyroxine 25 MICROGram(s) Oral daily  memantine 10 milliGRAM(s) Oral two times a day  mercaptopurine (Non - oncologic) 50 milliGRAM(s) Oral at bedtime  metoprolol succinate ER 50 milliGRAM(s) Oral daily  potassium chloride    Tablet ER 40 milliEquivalent(s) Oral every 4 hours  sodium chloride 0.9%. 1000 milliLiter(s) (100 mL/Hr) IV Continuous <Continuous>  triamterene 37.5 mG/hydrochlorothiazide 25 mG Tablet 1 Tablet(s) Oral daily    MEDICATIONS  (PRN):  acetaminophen     Tablet .. 650 milliGRAM(s) Oral every 6 hours PRN Temp greater or equal to 38C (100.4F), Mild Pain (1 - 3)  aluminum hydroxide/magnesium hydroxide/simethicone Suspension 30 milliLiter(s) Oral every 4 hours PRN Dyspepsia  melatonin 3 milliGRAM(s) Oral at bedtime PRN Insomnia  ondansetron Injectable 4 milliGRAM(s) IV Push every 8 hours PRN Nausea and/or Vomiting

## 2023-05-24 NOTE — CONSULT NOTE ADULT - ASSESSMENT
76 YO F with history of ulcerative colitis S/P Total Colectomy with J Pouch in 98. Admitted for Weakness 2/2 to COVID recent diagnosis    Abdomen is benign  Pt is passing gas and having BM  ID consult and is on no abx  J Pouch distended with stool  No current intervention needed by colorectal surgery at this time. Thank you for the consult    Case discussed with Dr Abreu

## 2023-05-24 NOTE — H&P ADULT - HISTORY OF PRESENT ILLNESS
76 y/o female w/ PMHx of Afib on Eliquis, hypothyroid, HTN, recent COVID, anxiety, depression, osteoporosis, ulcerative colitis s/p partial colectomy w/ J pouch, h/o lumbar fusion L4-L5 presents to the ED w/  c/o worsening fatigue, weakness, congestion w/ green mucous x1 week. Pt was prescribed abx by Dr. French yesterday, took first doses yesterday, but doesn't feel any better. Pt was here last week for dehydration and fatigue in the setting of COVID. Pt is now -COVID per . Denies bloody stools. Pt with recent multiple rib fx diagnosed on 5/2 s/p fall. 76 y/o female w/ PMHx of Afib on Eliquis, hypothyroid, HTN, recent COVID 5/2, anxiety, depression, osteoporosis, ulcerative colitis s/p total colectomy w/ J pouch, h/o lumbar fusion L4-L5 presents to the ED w/  c/o worsening fatigue, weakness, congestion w/ green mucous x1 week. Pt was prescribed abx by Dr. French yesterday, took first doses yesterday, but doesn't feel any better. Pt was here last week for dehydration and fatigue in the setting of COVID. Denies bloody stools. Pt with recent multiple rib fx diagnosed on 5/2 s/p fall. 76 y/o female w/ PMHx of Afib on Eliquis, hypothyroid, HTN, recent COVID 5/2, anxiety, depression, osteoporosis, ulcerative colitis s/p total colectomy w/ J pouch, h/o lumbar fusion L4-L5 presented to the ED w/  c/o worsening fatigue, weakness, congestion w/ green mucous x1 week. Pt was prescribed abx by Dr. French yesterday, took first doses yesterday, but doesn't feel any better. Pt was here last week for dehydration and fatigue in the setting of COVID. Denies bloody stools. Pt with recent multiple rib fx diagnosed on 5/2 s/p fall. She currently is seen in hospital bed, and denies any acute complaints. Patient denies any dyspnea, nausea, chest pain, headaches or vision changes at this time. She believes she has brain fog from her COVID diagnosis, because she has not been herself. She also says she was having a change in her bowel movement which has been more soft, however as per , her stool is baseline soft. As per , patient's memory issues have been an issue for the past 3 years and she has not had any acute change. As per , she is prone to sundowning in the hospital. As per , patient was feeling better after her recent ED visit when she was given fluids, then patient started to feel worse after two days. as per  has been doing 4 16 oz of water bottles a day, not eating as much, and they added electrolytes.    ED: CT scan was done  76 y/o female w/ PMHx of Afib on Eliquis, hypothyroid, HTN, recent COVID 5/2, anxiety, depression, osteoporosis, ulcerative colitis s/p total colectomy w/ J pouch, h/o lumbar fusion L4-L5 presented to the ED w/  c/o worsening fatigue, weakness, congestion w/ green mucous x1 week.     Pt was prescribed abx by Dr. French yesterday, took first doses yesterday, but doesn't feel any better.   Pt was here last week for dehydration and fatigue in the setting of COVID. Denies bloody stools. Pt with recent multiple rib fx diagnosed on 5/2 s/p fall. She currently is seen in hospital bed, and denies any acute complaints. Patient denies any dyspnea, nausea, chest pain, headaches or vision changes at this time. She believes she has brain fog from her COVID diagnosis, because she has not been herself. She also says she was having a change in her bowel movement which has been more soft, however as per , her stool is baseline soft. As per , patient's memory issues have been an issue for the past 3 years and she has not had any acute change. As per , she is prone to sundowning in the hospital. As per , patient was feeling better after her recent ED visit when she was given fluids, then patient started to feel worse after two days. as per  has been doing 4 16 oz of water bottles a day, not eating as much, and they added electrolytes.    ED: CT scan was done

## 2023-05-24 NOTE — H&P ADULT - ASSESSMENT
76 y/o female w/ PMHx of Afib on Eliquis, hypothyroid, HTN, recent COVID 5/2, anxiety, depression, osteoporosis, ulcerative colitis s/p total colectomy w/ J pouch presenting with malaise    #Malaise and Fatigue  #COVID-19  positive on 5/2/23  as per  has been getting more weak over time  as per  has been doing 4 16 oz of water bottles a day, not eating as much, and they added electrolytes such as pedialyte  Admit to Med-Surg  has loop recorder  EKG reviewed, no acute ST changes  ID consult   f./u A1C, f/u TSH  f/u am cbc, cmp,   f/u lactate, f/u trop   #Hx of total colectomy with ileo anal anastomosis  Blood cultures f/u  f/u Urine cultures  procalcitonin  CT abdomen pelvis reviewed: Fluid and feculent material in bowel, inflammatory changes in adjacent fact. Bowel distended with air. Rule out enteritis vs ileus   Colorectal Surgery consult   Chest X-ray reviewed  Bedside Focused Ultrasound performed at patient's bedside for educational purposes. The study will have a follow up study performed.    #Elevated Platelets  possibly due to acute phase reaction    #Hypernatremia  f/u metabolic panel  s/p IV fluids  concern for pre-renal     #Afib  has loop recorder  Metoprolol  Eliquis    #Hypothyroidism  synthroid    #HTN  Triamterene-HCTZ    #Heart murmur  f/u as outpatient     #Anxiety  #Depression  Duloxetine     #Protein Deficiency  Nutrition consult    #Memory difficulties  Steady decline over past 2 years  Memantine  Pt referral    #S/p Fall  tylenol prn pain for hx of rib fracture    #Dvt ppx  Eliquis    #Code Status  Full Code    #Dispo  inpatient mgmt     Discussed with Dr. Marion  76 y/o female w/ PMHx of Afib on Eliquis, hypothyroid, HTN, recent COVID 5/2, anxiety, depression, osteoporosis, ulcerative colitis s/p total colectomy w/ J pouch presenting with malaise    #Malaise and Fatigue  #COVID-19  positive on 5/2/23  as per  has been getting more weak over time  as per  has been doing 4 16 oz of water bottles a day, not eating as much, and they added electrolytes such as pedialyte  Admit to Med-Surg  has loop recorder  EKG reviewed, no acute ST changes  START empiric Zosyn   ID consult   f./u A1C, f/u TSH  f/u am cbc, cmp,   f/u lactate, f/u trop   #Hx of total colectomy with ileo anal anastomosis  Blood cultures f/u  f/u Urine cultures  procalcitonin  CT abdomen pelvis reviewed: Fluid and feculent material in bowel, inflammatory changes in adjacent fact. Bowel distended with air. Rule out enteritis vs ileus- started empiric abx of zosyn  Colorectal Surgery consult   Chest X-ray reviewed  Bedside Focused Ultrasound performed at patient's bedside for educational purposes. The study will have a follow up study performed.    #Elevated Platelets  possibly due to acute phase reaction    #Hypernatremia  f/u metabolic panel  s/p IV fluids  concern for pre-renal     #Afib  has loop recorder  Metoprolol  Eliquis    #Hypothyroidism  synthroid    #HTN  Triamterene-HCTZ    #Heart murmur  f/u as outpatient     #Anxiety  #Depression  Duloxetine     #Protein Deficiency  Nutrition consult    #Memory difficulties  Steady decline over past 2 years  Memantine  Pt referral    #S/p Fall  tylenol prn pain for hx of rib fracture    #Dvt ppx  Eliquis    #Code Status  Full Code    #Dispo  inpatient mgmt     Discussed with Dr. Marion  76 y/o female w/ PMHx of Afib on Eliquis, hypothyroid, HTN, recent COVID 5/2, anxiety, depression, osteoporosis, ulcerative colitis s/p total colectomy w/ J pouch presenting with malaise    #Malaise and Fatigue  #COVID-19  positive on 5/2/23  as per  has been getting more weak over time  as per  has been doing 4 16 oz of water bottles a day, not eating as much, and they added electrolytes such as pedialyte  Admit to Med-Surg  has loop recorder  EKG reviewed, no acute ST changes  START empiric Zosyn   ID consult   f./u A1C, f/u TSH  f/u am cbc, cmp,   f/u lactate, f/u trop       #Hx of total colectomy with ileo anal anastomosis  Blood cultures f/u  f/u Urine cultures  procalcitonin  CT abdomen pelvis reviewed: Fluid and feculent material in bowel, inflammatory changes in adjacent fact. Bowel distended with air. Rule out enteritis vs ileus- started empiric abx of zosyn  Colorectal Surgery consult   Chest X-ray reviewed  Bedside Focused Ultrasound performed at patient's bedside for educational purposes. The study will have a follow up study performed.    #Elevated Platelets  possibly due to acute phase reaction    #Hyponatremia  f/u metabolic panel  s/p IV fluids  concern for pre-renal     #Afib  has loop recorder  Metoprolol  Eliquis    #Hypothyroidism  synthroid    #HTN  Triamterene-HCTZ    #Heart murmur  f/u as outpatient     #Anxiety  #Depression  Duloxetine     #Protein Deficiency  Nutrition consult    #Memory difficulties  Steady decline over past 2 years  Memantine  PT referral    #S/p Fall  tylenol prn pain for hx of rib fracture      #Dvt ppx  Eliquis    #Code Status  Full Code    #Dispo  inpatient mgmt     Discussed with Dr. Marion

## 2023-05-24 NOTE — H&P ADULT - MUSCULOSKELETAL
strength 5/5 bilateral upper extremities/strength 5/5 bilateral lower extremities no calf tenderness/strength 5/5 bilateral upper extremities/strength 5/5 bilateral lower extremities

## 2023-05-24 NOTE — DIETITIAN INITIAL EVALUATION ADULT - OTHER INFO
78 y/o female w/ PMH of Afib on Eliquis, hypothyroid, HTN, recent COVID 5/2, anxiety, depression, osteoporosis, ulcerative colitis s/p total colectomy w/ J pouch, h/o lumbar fusion L4-L5 presented to the ED w/  c/o worsening fatigue, weakness, congestion w/ green mucous x1 week.     Currently on DASH diet. RD observed breakfast tray, consumed 90% tray (Tajik toast). Reports of taste coming back (recently lost sense of taste 2/2 COVID +). UBW stated at 130# however  endorses recent wt loss of ~5#. Bed scale wt of 133# taken by RD on 5/24 however moderate edema is skewing current wt appearance. Appeared thin and frail w/ NFPE revealing moderate to severe muscle/fat wasting. Will trial Premier protein shake BID to optimize nutritional needs (provides 160 kcal, 30 g protein/ shake) - pt receptive. See recommendations below.

## 2023-05-24 NOTE — PHYSICAL THERAPY INITIAL EVALUATION ADULT - DIAGNOSIS, PT EVAL
Malaise and Fatigue, COVID+, Hx of total colectomy with ileo anal anastomosis, h/o ribs fx, memory impairments

## 2023-05-24 NOTE — DIETITIAN INITIAL EVALUATION ADULT - PERTINENT LABORATORY DATA
05-24    132<L>  |  102  |  10  ----------------------------<  83  3.2<L>   |  23  |  1.09    Ca    8.5      24 May 2023 07:11  Phos  2.5     05-24  Mg     1.6     05-24    TPro  6.0  /  Alb  2.7<L>  /  TBili  0.7  /  DBili  x   /  AST  33  /  ALT  55  /  AlkPhos  124<H>  05-24  POCT Blood Glucose.: 103 mg/dL (05-23-23 @ 18:03)  A1C with Estimated Average Glucose Result: 5.9 % (05-24-23 @ 09:16)  A1C with Estimated Average Glucose Result: 5.8 % (07-26-22 @ 14:21)    Vitamin B12, Serum: 489 pg/mL (05-24-23 @ 08:26)

## 2023-05-24 NOTE — H&P ADULT - ATTENDING COMMENTS
77 year old femlale w AFIB on AC, loop recorderhypothyroid, HTN, recent COVID 5/2, anxiety,/depression, OP, UC s/p SUBtotal colectomy w J pouch, h/o lumbar fusion L4-L5, recent rib fx after fall presented to  ED w  c/o worsening fatigue, weakness, congestion w/ green mucous x1 week.     ED also spoke w PCP      #Generalized weakness  #decreased po intake  #recent COVID+ 05-02  #Ileus/enteritis in setting of altered anatomy  #s/p subtotal colectomy for UC  CT scan suggests +possible enteritis of distal small bowel w ileus (impression above placed)  1. admit to med  2. empiric zosyn  3. CRC  4. IVF  5. follow up cx  6. follow up lactate    #Hyponatremia  #Hypokalemia  despite electrolyte supplements at home  may account for reason for ileus  1. IV replacement NS and K  2. trend K, Na  3. check Mg      #Fall  #prior rib fx after fall   1. PT      #AF  1. AC  2. BB      #Anxiety/depression  1. duloxetine      #HTN1.   1.continue ginger e meds      #Hypothyroid  1. continue synthrid        #Memory impairment  predated COVID  1. memantine      #Protein deficiency  1. nutrition      #VTE  onAC 77 year old femlale w AFIB on AC, loop recorderhypothyroid, HTN, recent COVID 5/2, anxiety,/depression, OP, UC s/p SUBtotal colectomy w J pouch, h/o lumbar fusion L4-L5, recent rib fx after fall presented to  ED w  c/o worsening fatigue, weakness, congestion w/ green mucous x1 week.     ED also spoke w PCP      #Generalized weakness  #decreased po intake  #recent COVID+ 05-02  #Ileus/enteritis in setting of altered anatomy  #s/p subtotal colectomy for UC  CT scan suggests +possible enteritis of distal small bowel w ileus (impression above placed)  1. admit to med  2. empiric zosyn  3. CRC  4. IVF  5. follow up cx  6. follow up lactate    #Hyponatremia  #Hypokalemia  despite electrolyte supplements at home  may account for reason for ileus  1. IV replacement NS and K  2. trend K, Na  3. check Mg      #Fall  #prior rib fx after fall   1. PT      #AF  1. AC  2. BB  3. amiodarone      #Anxiety/depression  1. duloxetine      #HTN1.   1.continue ginger e meds      #Hypothyroid  1. continue synthrid        #Memory impairment  predated COVID  1. memantine      #Protein deficiency  1. nutrition      #VTE  onAC 77 year old femlale w AFIB on AC, loop recorderhypothyroid, HTN, recent COVID 5/2, anxiety,/depression, OP, UC s/p SUBtotal colectomy w J pouch, h/o lumbar fusion L4-L5, recent rib fx after fall presented to  ED w  c/o worsening fatigue, weakness, congestion w/ green mucous x1 week.     ED also spoke w PCP      #Generalized weakness  #decreased po intake  #recent COVID+ 05-02  #Ileus/enteritis in setting of altered anatomy  #s/p subtotal colectomy for UC  CT scan suggests +possible enteritis of distal small bowel w ileus (impression above placed)  1. admit to med  2. empiric zosyn  3. CRC consult  4. IVF  5. follow up cx  6. follow up lactate    #Hyponatremia  #Hypokalemia  despite electrolyte supplements at home  may account for reason for ileus  1. IV replacement NS and K  2. trend K, Na  3. check Mg      #Fall  #prior rib fx after fall   1. PTevaluation  2. check B12  3. incentive spirometry      #AF  1. AC  2. BB  3. amiodarone      #Anxiety/depression  1. duloxetine      #HTN   1.continue ginger e meds      #Hypothyroid  1. continue synthrid        #Memory impairment  predated COVID  1. memantine      #Protein deficiency  1. nutrition      #VTE  onAC 77 year old femlale w AFIB on AC, loop recorderhypothyroid, HTN, recent COVID 5/2, anxiety,/depression, OP, UC s/p SUBtotal colectomy w J pouch, h/o lumbar fusion L4-L5, recent rib fx after fall presented to  ED w  c/o worsening fatigue, weakness, congestion w/ green mucous x1 week.     ED also spoke w PCP      #Generalized weakness  #decreased po intake  #recent COVID+ 05-02  #Ileus/enteritis in setting of altered anatomy  #s/p subtotal colectomy for UC  CT scan suggests +possible enteritis of distal small bowel w ileus (impression above placed)  1. place on observation  2. empiric zosyn  3. CRC consult  4. IVF  5. follow up cx  6. follow up lactate    #Hyponatremia  #Hypokalemia  despite electrolyte supplements at home  may account for reason for ileus  1. IV replacement NS and K  2. trend K, Na  3. check Mg      #Fall  #prior rib fx after fall   1. PTevaluation  2. check B12  3. incentive spirometry      #AF  1. AC  2. BB  3. amiodarone      #Anxiety/depression  1. duloxetine      #HTN   1.continue ginger e meds      #Hypothyroid  1. continue synthrid        #Memory impairment  predated COVID  1. memantine      #Protein deficiency  1. nutrition      #VTE  onAC

## 2023-05-24 NOTE — PHYSICAL THERAPY INITIAL EVALUATION ADULT - GENERAL OBSERVATIONS, REHAB EVAL
Pt was found standing in the BR holding onto IV pole with nsg assistant, Pt is pleasant and willing to amb with PT, spouse at bedside

## 2023-05-24 NOTE — DIETITIAN INITIAL EVALUATION ADULT - ADD RECOMMEND
1) Liberalize diet to regular to maximize caloric and nutrient intake.   2) Provide Premier protein shake BID to optimize nutritional needs (provides 160 kcal, 30 g protein/ shake)  3) Encourage protein-rich foods, maximize food preferences  4) Monitor bowel movements, if no BM for >3 days, consider implementing bowel regimen.  5) MVI w/ minerals daily to ensure 100% RDA met  6) Consider adding thiamine 100 mg daily 2/2 poor PO intake/ malnutrition   7) Obtain weekly wt to track/trend changes   8) Monitor daily lytes/min and replete prn  9) Confirm goals of care regarding nutrition support    RD will continue to monitor PO intake, labs, hydration, and wt prn.

## 2023-05-24 NOTE — CONSULT NOTE ADULT - SUBJECTIVE AND OBJECTIVE BOX
HPI:  Pt is  a 76 YO F with h/o A.fib on Eliquis, hypothyroidism, HTN, recent COVID-19 viral syndrome on 5/2(from trip to [place]), anxiety, depression, osteoporosis, ulcerative colitis s/p total colectomy w/ J pouch, lumbar fusion L4-L5 was admitted on 5/23 for worsening fatigue, weakness, congestion with mucous production for 1 week. Pt was was seen and prescribed abx by Dr. French but  took first doses yesterday, but didn't have relieve of symptoms.  She believes she has brain fog, because she has not been herself. She also says she was having a change in her bowel movement which has been more soft, however as per , her stool is baseline soft. As per , patient's memory issues have been an issue for the past 3 years and she has not had any acute change. As per , she is prone to sundowning in the hospital. In ER she received zosyn.   During today's encounter, Pt was alert and denied any abdominal pain or discomfort. Pt admitted that her stools are about normal and the total bowel movements is dependent on the day and what she eats. Pt stat   HPI:  Pt is  a 78 YO F with h/o A.fib on Eliquis, hypothyroidism, HTN, recent COVID-19 viral syndrome on 5/2(from trip to [place]), anxiety, depression, osteoporosis, ulcerative colitis s/p total colectomy w/ J pouch, lumbar fusion L4-L5 was admitted on 5/23 for worsening fatigue, weakness, congestion with mucous production for 1 week. Pt was was seen and prescribed abx by Dr. French but  took first doses yesterday, but didn't have relieve of symptoms.  She believes she has brain fog, because she has not been herself. She also says she was having a change in her bowel movement which has been more soft, however as per , her stool is baseline soft. As per , patient's memory issues have been an issue for the past 3 years and she has not had any acute change. As per , she is prone to sundowning in the hospital. In ER she received zosyn.   During today's encounter, Pt was alert and denied any abdominal pain or discomfort. Pt admitted that her stools are about normal and the total bowel movements is dependent on the day and what she eats.     Vital Signs Last 24 Hrs  T(C): 36.7 (24 May 2023 15:23), Max: 36.7 (24 May 2023 15:23)  T(F): 98 (24 May 2023 15:23), Max: 98 (24 May 2023 15:23)  HR: 68 (24 May 2023 15:23) (64 - 68)  BP: 115/70 (24 May 2023 15:23) (102/83 - 117/67)  BP(mean): --  ABP: --  ABP(mean): --  RR: 18 (24 May 2023 15:23) (18 - 18)  SpO2: 98% (24 May 2023 15:23) (97% - 100%)    O2 Parameters below as of 24 May 2023 15:23  Patient On (Oxygen Delivery Method): room air    PE:  Gen: Weak, Eldery  CV: S1S2 Present  Pulm: No work of breathing  Abd: Soft, non distended, non tender, well healed surgical scars  Anal: Stenotic anus    Labs:              9.9<L>                x    | x    | x            6.89  >-----------< 389     ------------------------< x                     30.7<L>                x    | x    | x                                                                         Ca x     Mg x     Ph x        ,             x                    132<L>| 23   | 10           x     >-----------< x       ------------------------< 83                    x                    3.2<L>| 102  | 1.09                                                                      Ca 8.5   Mg x     Ph x          Radiology:  CT Abdomen and Pelvis w/ IV Cont (05.23.23 @ 22:34) >  FINDINGS:  LOWER CHEST: Cardiomegaly. Coronary artery calcifications. Minimal   bibasilar bronchiectasis. Probable surgical clips in the right breast.    LIVER: Within normal limits.  BILE DUCTS: Normal caliber.  GALLBLADDER: Within normal limits.  SPLEEN: Within normal limits.  PANCREAS: Within normal limits.  ADRENALS: 3.7 x 2.7 cm well-circumscribed peripherally calcified lesion   possibly emanating from the lateral limb of the left adrenal gland or   upper pole of the left kidney. It is unchanged when compared to the   previous exam. This remains indeterminant.  KIDNEYS/URETERS: Within normal limits.    BLADDER: Within normal limits.  REPRODUCTIVE ORGANS: Hysterectomy.    BOWEL: Small hiatal hernia. Patient is status post subtotal colectomy.   The ileoanal anastomosis is intact. The bowel just proximal to the anus   is filled with fluid and some feculent material and there is mild bowel   wall thickening with minimal inflammatory changes in the adjacent fat.   Relate clinically for infection. Proximal to this the bowel is air   distended measuring up to 6 cm and gradually tapers to normal caliber at   the level of the jejunum. The stomach is not distended. There is no   definite point of mechanical obstruction. Findings may represent   enteritis of the distal small bowel with associated ileus.    PERITONEUM: No ascites.  VESSELS: Atherosclerotic changes. Left renal vein coarse calcifications   are unchanged possibly from prior thrombus.  RETROPERITONEUM/LYMPH NODES: No lymphadenopathy.  ABDOMINAL WALL: Probable small calcified injection granuloma in the right   buttocks subcutaneous fat.  BONES: Degenerative changes. Scoliosis. Lumbar orthopedic hardware.    IMPRESSION:    Patient is status post subtotal colectomy. The ileoanal anastomosis is   intact. The bowel just proximal to the anus is filled with fluid and some   feculent material and there is mild bowel wall thickening with minimal   inflammatory changesin the adjacent fat. Relate clinically for   infection. Proximal to this the bowel is air distended measuring up to 6   cm and gradually tapers to normal caliber at the level of the jejunum.   The stomach is not distended. There is no definite point of mechanical   obstruction. Findings may represent enteritis of the distal small bowel   with associated ileus.    3.7 x 2.7 cm well-circumscribed peripherally calcified lesion possibly   emanating from the lateral limb of the left adrenal gland or upper pole   of the left kidney. It is unchanged when compared to the previous exam.   This remains indeterminant. MRI is recommended for further evaluation.

## 2023-05-24 NOTE — H&P ADULT - NSHPLABSRESULTS_GEN_ALL_CORE
COMPARISON: CT chest 5/16/2023. CT abdomen pelvis 9/15/2022.    IMPRESSION:    Patient is status post subtotal colectomy. The ileoanal anastomosis is   intact. The bowel just proximal to the anus is filled with fluid and some   feculent material and there is mild bowel wall thickening with minimal   inflammatory changes in the adjacent fat. Relate clinically for   infection. Proximal to this the bowel is air distended measuring up to 6   cm and gradually tapers to normal caliber at the level of the jejunum.   The stomach is not distended. There is no definite point of mechanical   obstruction. Findings may represent enteritis of the distal small bowel   with associated ileus.    3.7 x 2.7 cm well-circumscribed peripherally calcified lesion possibly   emanating from the lateral limb of the left adrenal gland or upper pole   of the left kidney. It is unchanged when compared to the previous exam.   This remains indeterminant. MRI is recommended for further evaluation.

## 2023-05-24 NOTE — H&P ADULT - NSHPPHYSICALEXAM_GEN_ALL_CORE
Vital Signs Last 24 Hrs  T(C): 36.6 (24 May 2023 00:00), Max: 36.6 (23 May 2023 13:52)  T(F): 97.9 (24 May 2023 00:00), Max: 97.9 (23 May 2023 13:52)  HR: 67 (24 May 2023 00:00) (67 - 72)  BP: 117/67 (24 May 2023 00:00) (102/83 - 117/67)  BP(mean): 88 (23 May 2023 13:52) (88 - 88)  RR: 18 (24 May 2023 00:00) (18 - 18)  SpO2: 100% (24 May 2023 00:00) (97% - 100%)    Parameters below as of 24 May 2023 00:00  Patient On (Oxygen Delivery Method): room air

## 2023-05-24 NOTE — H&P ADULT - TIME BILLING
I spent a total of 80 minutes on the date of this encounter coordinating the patient's care. This includes reviewing prior documentation, results and imaging in addition to completing a history and physical examination on the patient. Further tests, medications, and procedures have been ordered as indicated. Laboratory results and the plan of care were communicated to the patient and their family member. Supporting documentation was completed and added to the patient's chart.

## 2023-05-24 NOTE — DIETITIAN INITIAL EVALUATION ADULT - ORAL INTAKE PTA/DIET HISTORY
Pt lives at home w/ . Reports of decreased appetite x 5 days PTA - meeting <50% ENN. Takes B complex vitamin at home and does not use ONS 2/2 dislike in taste (ensure). Recent loss of taste 2/2 COVID +

## 2023-05-25 ENCOUNTER — TRANSCRIPTION ENCOUNTER (OUTPATIENT)
Age: 78
End: 2023-05-25

## 2023-05-25 VITALS
DIASTOLIC BLOOD PRESSURE: 69 MMHG | OXYGEN SATURATION: 100 % | RESPIRATION RATE: 18 BRPM | SYSTOLIC BLOOD PRESSURE: 103 MMHG | TEMPERATURE: 98 F | HEART RATE: 66 BPM

## 2023-05-25 LAB
AMMONIA BLD-MCNC: 26 UMOL/L — SIGNIFICANT CHANGE UP (ref 11–32)
ANION GAP SERPL CALC-SCNC: 5 MMOL/L — SIGNIFICANT CHANGE UP (ref 5–17)
BASE EXCESS BLDA CALC-SCNC: 1.5 MMOL/L — SIGNIFICANT CHANGE UP (ref -2–3)
BLOOD GAS COMMENTS ARTERIAL: SIGNIFICANT CHANGE UP
BUN SERPL-MCNC: 8 MG/DL — SIGNIFICANT CHANGE UP (ref 7–23)
CALCIUM SERPL-MCNC: 8.7 MG/DL — SIGNIFICANT CHANGE UP (ref 8.5–10.1)
CHLORIDE SERPL-SCNC: 102 MMOL/L — SIGNIFICANT CHANGE UP (ref 96–108)
CO2 SERPL-SCNC: 27 MMOL/L — SIGNIFICANT CHANGE UP (ref 22–31)
CREAT SERPL-MCNC: 0.88 MG/DL — SIGNIFICANT CHANGE UP (ref 0.5–1.3)
CULTURE RESULTS: SIGNIFICANT CHANGE UP
EGFR: 67 ML/MIN/1.73M2 — SIGNIFICANT CHANGE UP
GAS PNL BLDA: SIGNIFICANT CHANGE UP
GLUCOSE SERPL-MCNC: 117 MG/DL — HIGH (ref 70–99)
HCO3 BLDA-SCNC: 25 MMOL/L — SIGNIFICANT CHANGE UP (ref 21–28)
HCT VFR BLD CALC: 30.5 % — LOW (ref 34.5–45)
HGB BLD-MCNC: 9.9 G/DL — LOW (ref 11.5–15.5)
MAGNESIUM SERPL-MCNC: 1.8 MG/DL — SIGNIFICANT CHANGE UP (ref 1.6–2.6)
MCHC RBC-ENTMCNC: 28.9 PG — SIGNIFICANT CHANGE UP (ref 27–34)
MCHC RBC-ENTMCNC: 32.5 GM/DL — SIGNIFICANT CHANGE UP (ref 32–36)
MCV RBC AUTO: 89.2 FL — SIGNIFICANT CHANGE UP (ref 80–100)
PCO2 BLDA: 34 MMHG — SIGNIFICANT CHANGE UP (ref 32–45)
PH BLDA: 7.47 — HIGH (ref 7.35–7.45)
PHOSPHATE SERPL-MCNC: 1.9 MG/DL — LOW (ref 2.5–4.5)
PLATELET # BLD AUTO: 385 K/UL — SIGNIFICANT CHANGE UP (ref 150–400)
PO2 BLDA: 94 MMHG — SIGNIFICANT CHANGE UP (ref 83–108)
POTASSIUM SERPL-MCNC: 4.1 MMOL/L — SIGNIFICANT CHANGE UP (ref 3.5–5.3)
POTASSIUM SERPL-SCNC: 4.1 MMOL/L — SIGNIFICANT CHANGE UP (ref 3.5–5.3)
RBC # BLD: 3.42 M/UL — LOW (ref 3.8–5.2)
RBC # FLD: 18.4 % — HIGH (ref 10.3–14.5)
SAO2 % BLDA: 97 % — SIGNIFICANT CHANGE UP (ref 94–98)
SODIUM SERPL-SCNC: 134 MMOL/L — LOW (ref 135–145)
SPECIMEN SOURCE: SIGNIFICANT CHANGE UP
TSH SERPL-MCNC: 1.88 UU/ML — SIGNIFICANT CHANGE UP (ref 0.34–4.82)
WBC # BLD: 5.99 K/UL — SIGNIFICANT CHANGE UP (ref 3.8–10.5)
WBC # FLD AUTO: 5.99 K/UL — SIGNIFICANT CHANGE UP (ref 3.8–10.5)

## 2023-05-25 PROCEDURE — 99239 HOSP IP/OBS DSCHRG MGMT >30: CPT

## 2023-05-25 RX ORDER — PANTOPRAZOLE SODIUM 20 MG/1
1 TABLET, DELAYED RELEASE ORAL
Qty: 14 | Refills: 0
Start: 2023-05-25 | End: 2023-06-07

## 2023-05-25 RX ORDER — ACETAMINOPHEN 500 MG
2 TABLET ORAL
Qty: 0 | Refills: 0 | DISCHARGE
Start: 2023-05-25

## 2023-05-25 RX ORDER — SUCRALFATE 1 G
1 TABLET ORAL
Qty: 56 | Refills: 0
Start: 2023-05-25 | End: 2023-06-07

## 2023-05-25 RX ORDER — PREGABALIN 225 MG/1
1000 CAPSULE ORAL ONCE
Refills: 0 | Status: COMPLETED | OUTPATIENT
Start: 2023-05-25 | End: 2023-05-25

## 2023-05-25 RX ADMIN — MEMANTINE HYDROCHLORIDE 10 MILLIGRAM(S): 10 TABLET ORAL at 10:11

## 2023-05-25 RX ADMIN — PREGABALIN 1000 MICROGRAM(S): 225 CAPSULE ORAL at 14:34

## 2023-05-25 RX ADMIN — DULOXETINE HYDROCHLORIDE 20 MILLIGRAM(S): 30 CAPSULE, DELAYED RELEASE ORAL at 10:11

## 2023-05-25 RX ADMIN — AMIODARONE HYDROCHLORIDE 200 MILLIGRAM(S): 400 TABLET ORAL at 10:12

## 2023-05-25 RX ADMIN — DULOXETINE HYDROCHLORIDE 60 MILLIGRAM(S): 30 CAPSULE, DELAYED RELEASE ORAL at 10:11

## 2023-05-25 RX ADMIN — Medication 1 TABLET(S): at 10:12

## 2023-05-25 RX ADMIN — Medication 100 MILLIGRAM(S): at 10:12

## 2023-05-25 RX ADMIN — Medication 1 TABLET(S): at 10:11

## 2023-05-25 RX ADMIN — Medication 25 MICROGRAM(S): at 05:25

## 2023-05-25 RX ADMIN — APIXABAN 5 MILLIGRAM(S): 2.5 TABLET, FILM COATED ORAL at 10:11

## 2023-05-25 RX ADMIN — Medication 50 MILLIGRAM(S): at 10:12

## 2023-05-25 NOTE — DISCHARGE NOTE PROVIDER - CARE PROVIDER_API CALL
Nilo French  Internal Medicine  [address]   [address]  [place] [id number]  Phone: [phone]  Fax: [phone]  Follow Up Time: 1 week    Osman Ayala  Neurology  [address], [address]  [place] [address]  Phone: [phone]  Fax: [phone]  Follow Up Time: 1 week

## 2023-05-25 NOTE — DISCHARGE NOTE PROVIDER - NSDCCPCAREPLAN_GEN_ALL_CORE_FT
PRINCIPAL DISCHARGE DIAGNOSIS  Diagnosis: General weakness  Assessment and Plan of Treatment: Your were hopitalized for malaise and fatigue during this hospitalization. Labs and imaging did not clearly indicate the cause of your generalized weakness. Please follow up with your PCP and neurologist for further management.      SECONDARY DISCHARGE DIAGNOSES  Diagnosis: COVID-19  Assessment and Plan of Treatment: No treatment needed at this time. Please follow up with your PCP for further management.    Diagnosis: Hyponatremia  Assessment and Plan of Treatment: Your low sodium level is likely due low food intake. To get better maintain good diet and hydration. Please follow up with your PCP for further mangement.    Diagnosis: Fatigue  Assessment and Plan of Treatment:     Diagnosis: Anorexia  Assessment and Plan of Treatment:     Diagnosis: General weakness  Assessment and Plan of Treatment:      PRINCIPAL DISCHARGE DIAGNOSIS  Diagnosis: General weakness  Assessment and Plan of Treatment: Your were hopitalized for malaise and fatigue during this hospitalization. Labs and imaging, which were unremarkable, did not clearly indicate the cause of your generalized weakness. However, on the day of discharge you were medically stable to go home. Please follow up with your PCP and neurologist for further management.      SECONDARY DISCHARGE DIAGNOSES  Diagnosis: COVID-19  Assessment and Plan of Treatment: No treatment needed at this time. Please follow up with your PCP for further management.    Diagnosis: Hyponatremia  Assessment and Plan of Treatment: Your low sodium level is likely due low food intake. To get better maintain good diet and hydration. Please follow up with your PCP for further mangement.    Diagnosis: Anorexia  Assessment and Plan of Treatment: Please follow up with your PCP for further management.    Diagnosis: Memory problem  Assessment and Plan of Treatment: Continue taking memantine and follow up your PCP for further management.

## 2023-05-25 NOTE — PROGRESS NOTE ADULT - SUBJECTIVE AND OBJECTIVE BOX
HPI: 76 y/o female w/ PMHx of Afib on Eliquis, hypothyroid, HTN, recent COVID 5/2, anxiety, depression, osteoporosis, ulcerative colitis s/p total colectomy w/ J pouch, h/o lumbar fusion L4-L5 presented to the ED w/  c/o worsening fatigue, weakness, congestion w/ green mucous x1 week. Pt was prescribed abx by Dr. French day prior to presentation, took first doses yesterday, but did not feel improvement. Was recommended by Dr. French to F/U in ED.     05/24: Patient seen at bedside, states she feels over all better. No acute complaints.     Vitals:  ============  T(F): 98 (24 May 2023 15:23), Max: 98 (24 May 2023 15:23)  HR: 68 (24 May 2023 15:23)  BP: 115/70 (24 May 2023 15:23)  RR: 18 (24 May 2023 15:23)  SpO2: 98% (24 May 2023 15:23) (97% - 100%)  temp max in last 48H T(F): , Max: 98 (05-24-23 @ 15:23)    Physical Exam   Gen: NAD, comfortable  HENT: atraumatic head and ears, no gross abnormalities of ears, mucous membranes moist, no oral lesions, neck supple without masses/goiter/lymphadenopathy  CV: irregular rate and rhythm, no M/R/G  Pulm: nl respiratory effort, CTAB, no wheezes/crackles/rhonchi  Back: no scoliosis, lordosis, or kyphosis, no tenderness  Abd: normoactive bowel sounds in all 4 quadrants, soft, nontender, nondistended, no rebound, no guarding, no masses  Extremities: no pedal edema, pedal pulses palpable   Skin: nl warm and dry, no wounds   Neuro: A&Ox3, answering questions appropriately, PERRL, EOMI, face symmetric, sensation equal bilaterally in face, tongue midline  Pysch: no depression, no SI, no HI      =======================================================  Current Antibiotics:    Other medications:  aMIOdarone    Tablet 200 milliGRAM(s) Oral daily  apixaban 5 milliGRAM(s) Oral every 12 hours  atorvastatin 20 milliGRAM(s) Oral at bedtime  DULoxetine 60 milliGRAM(s) Oral daily  DULoxetine 20 milliGRAM(s) Oral daily  levothyroxine 25 MICROGram(s) Oral daily  memantine 10 milliGRAM(s) Oral two times a day  mercaptopurine (Non - oncologic) 50 milliGRAM(s) Oral at bedtime  metoprolol succinate ER 50 milliGRAM(s) Oral daily  multivitamin/minerals 1 Tablet(s) Oral daily  potassium chloride    Tablet ER 40 milliEquivalent(s) Oral every 4 hours  sodium chloride 0.9%. 1000 milliLiter(s) IV Continuous <Continuous>  thiamine 100 milliGRAM(s) Oral daily  triamterene 37.5 mG/hydrochlorothiazide 25 mG Tablet 1 Tablet(s) Oral daily      =======================================================  Labs:                        9.9    6.89  )-----------( 389      ( 24 May 2023 09:16 )             30.7     05-24    132<L>  |  102  |  10  ----------------------------<  83  3.2<L>   |  23  |  1.09    Ca    8.5      24 May 2023 07:11  Phos  2.5     05-24  Mg     1.6     05-24    TPro  6.0  /  Alb  2.7<L>  /  TBili  0.7  /  DBili  x   /  AST  33  /  ALT  55  /  AlkPhos  124<H>  05-24      Creatinine, Serum: 1.09 mg/dL (05-24-23 @ 07:11)  Creatinine, Serum: 1.16 mg/dL (05-24-23 @ 01:36)  Creatinine, Serum: 1.10 mg/dL (05-23-23 @ 17:22)    Procalcitonin, Serum: 0.12 ng/mL (05-24-23 @ 01:36)    D-Dimer Assay, Quantitative: 296 ng/mL DDU (05-16-23 @ 14:16)        WBC Count: 6.89 K/uL (05-24-23 @ 09:16)  WBC Count: 8.33 K/uL (05-23-23 @ 17:22)    SARS-CoV-2 Result: Detected (05-23-23 @ 17:08)  SARS-CoV-2 Result: Detected (05-16-23 @ 14:16)      Alkaline Phosphatase, Serum: 124 U/L (05-24-23 @ 07:11)  Alkaline Phosphatase, Serum: 136 U/L (05-23-23 @ 17:22)  Alanine Aminotransferase (ALT/SGPT): 55 U/L (05-24-23 @ 07:11)  Alanine Aminotransferase (ALT/SGPT): 64 U/L (05-23-23 @ 17:22)  Aspartate Aminotransferase (AST/SGOT): 33 U/L (05-24-23 @ 07:11)  Aspartate Aminotransferase (AST/SGOT): 60 U/L (05-23-23 @ 17:22)  Bilirubin Total, Serum: 0.7 mg/dL (05-24-23 @ 07:11)  Bilirubin Total, Serum: 0.9 mg/dL (05-23-23 @ 17:22)    < from: Xray Chest 2 Views PA/Lat (05.23.23 @ 15:32) >  IMPRESSION:  1. Clear lungs with no acute cardiopulmonary abnormalities.  2. Degenerative changes of the thoracic spine are seen however the known   left anterior rib fractures are not well demonstrated on this study.  3. Cardiac loop recorder in situ, unchanged.  4. Reverse proximal right humeral prosthesis, unchanged.  5. Gastric dilatation.      < end of copied text >      < from: CT Abdomen and Pelvis w/ IV Cont (05.23.23 @ 22:34) >    IMPRESSION:    Patient is status post subtotal colectomy. The ileoanal anastomosis is   intact. The bowel just proximal to the anus is filled with fluid and some   feculent material and there is mild bowel wall thickening with minimal   inflammatory changesin the adjacent fat. Relate clinically for   infection. Proximal to this the bowel is air distended measuring up to 6   cm and gradually tapers to normal caliber at the level of the jejunum.   The stomach is not distended. There is no definite point of mechanical   obstruction. Findings may represent enteritis of the distal small bowel   with associated ileus.    3.7 x 2.7 cm well-circumscribed peripherally calcified lesion possibly   emanating from the lateral limb of the left adrenal gland or upper pole   of the left kidney. It is unchanged when compared to the previous exam.   This remains indeterminant. MRI is recommended for further evaluation.      < end of copied text >    
Date of service: 23 @ 06:52    Lying in bed in NAD  No abdominal pain  Stools are soft    ROS: no fever or chills; denies dizziness, no HA, no SOB or cough, no dysuria, no legs pain, no rashes    MEDICATIONS  (STANDING):  aMIOdarone    Tablet 200 milliGRAM(s) Oral daily  apixaban 5 milliGRAM(s) Oral every 12 hours  atorvastatin 20 milliGRAM(s) Oral at bedtime  DULoxetine 60 milliGRAM(s) Oral daily  DULoxetine 20 milliGRAM(s) Oral daily  levothyroxine 25 MICROGram(s) Oral daily  memantine 10 milliGRAM(s) Oral two times a day  mercaptopurine (Non - oncologic) 50 milliGRAM(s) Oral at bedtime  metoprolol succinate ER 50 milliGRAM(s) Oral daily  multivitamin/minerals 1 Tablet(s) Oral daily  sodium chloride 0.9%. 1000 milliLiter(s) (100 mL/Hr) IV Continuous <Continuous>  thiamine 100 milliGRAM(s) Oral daily  triamterene 37.5 mG/hydrochlorothiazide 25 mG Tablet 1 Tablet(s) Oral daily    Vital Signs Last 24 Hrs  T(C): 36.3 (24 May 2023 23:03), Max: 36.7 (24 May 2023 15:23)  T(F): 97.3 (24 May 2023 23:03), Max: 98 (24 May 2023 15:23)  HR: 69 (24 May 2023 23:03) (64 - 69)  BP: 110/82 (24 May 2023 23:03) (110/82 - 115/70)  BP(mean): --  RR: 18 (24 May 2023 23:03) (18 - 18)  SpO2: 100% (24 May 2023 23:03) (97% - 100%)    Parameters below as of 24 May 2023 23:03  Patient On (Oxygen Delivery Method): room air     Physical exam:    Constitutional:  No acute distress  HEENT: NC/AT, EOMI, PERRLA, conjunctivae clear; ears and nose atraumatic  Neck: supple; thyroid not palpable  Back: no tenderness  Respiratory: respiratory effort normal; decreased BS at bases  Cardiovascular: S1S2 regular, no murmurs  Abdomen: soft, not tender, not distended, positive BS  Genitourinary: no suprapubic tenderness  Lymphatic: no LN palpable  Musculoskeletal: no muscle tenderness, no joint swelling or tenderness  Extremities: no pedal edema  Neurological/ Psychiatric: AxOx3, moving all extremities  Skin: no rashes; no palpable lesions    Labs: reviewed                        9.9    6.89  )-----------( 389      ( 24 May 2023 09:16 )             30.7     05-24    132<L>  |  102  |  10  ----------------------------<  83  3.2<L>   |  23  |  1.09    Ca    8.5      24 May 2023 07:11  Phos  2.8     05-24  Mg     1.6     05-24    TPro  6.0  /  Alb  2.7<L>  /  TBili  0.7  /  DBili  x   /  AST  33  /  ALT  55  /  AlkPhos  124<H>  05-24    D-Dimer Assay, Quantitative: 296 ng/mL DDU (23 @ 14:16)                        12.2   8.33  )-----------( 536      ( 23 May 2023 17:22 )             37.2     05-24    132<L>  |  102  |  10  ----------------------------<  83  3.2<L>   |  23  |  1.09    Ca    8.5      24 May 2023 07:11  Phos  2.5     05-24  Mg     1.6     05-24    TPro  6.0  /  Alb  2.7<L>  /  TBili  0.7  /  DBili  x   /  AST  33  /  ALT  55  /  AlkPhos  124<H>  05-24     LIVER FUNCTIONS - ( 24 May 2023 07:11 )  Alb: 2.7 g/dL / Pro: 6.0 gm/dL / ALK PHOS: 124 U/L / ALT: 55 U/L / AST: 33 U/L / GGT: x           Urinalysis Basic - ( 23 May 2023 17:21 )    Color: Yellow / Appearance: Clear / S.005 / pH: x  Gluc: x / Ketone: Negative  / Bili: Negative / Urobili: Negative   Blood: x / Protein: Negative / Nitrite: Negative   Leuk Esterase: Trace / RBC: 0-2 /HPF / WBC 0-2 /HPF   Sq Epi: x / Non Sq Epi: x / Bacteria: Occasional      Culture - Urine (collected 23 May 2023 17:21)  Source: Clean Catch None  Final Report (25 May 2023 06:43):    <10,000 CFU/mL Normal Urogenital Yojana    Radiology: all available radiological tests reviewed    < from: CT Abdomen and Pelvis w/ IV Cont (23 @ 22:34) >  Patient is status post subtotal colectomy. The ileoanal anastomosis is   intact. The bowel just proximal to the anus is filled with fluid and some   feculent material and there is mild bowel wall thickening with minimal   inflammatory changesin the adjacent fat. Relate clinically for   infection. Proximal to this the bowel is air distended measuring up to 6   cm and gradually tapers to normal caliber at the level of the jejunum.   The stomach is not distended. There is no definite point of mechanical   obstruction. Findings may represent enteritis of the distal small bowel   with associated ileus.    3.7 x 2.7 cm well-circumscribed peripherally calcified lesion possibly   emanating from the lateral limb of the left adrenal gland or upper pole   of the left kidney. It is unchanged when compared to the previous exam.     < end of copied text >      Advanced directives addressed: full resuscitation

## 2023-05-25 NOTE — DISCHARGE NOTE PROVIDER - PROVIDER TOKENS
PROVIDER:[TOKEN:[[id number]],FOLLOWUP:[1 week]],PROVIDER:[TOKEN:[[id number]],FOLLOWUP:[1 week]]

## 2023-05-25 NOTE — DISCHARGE NOTE PROVIDER - DETAILS OF MALNUTRITION DIAGNOSIS/DIAGNOSES
This patient has been assessed with a concern for Malnutrition and was treated during this hospitalization for the following Nutrition diagnosis/diagnoses:     -  05/24/2023: Severe protein-calorie malnutrition

## 2023-05-25 NOTE — PROGRESS NOTE ADULT - ASSESSMENT
76 y/o female w/ PMHx of Afib on Eliquis, hypothyroid, HTN, recent COVID 5/2, anxiety, depression, osteoporosis, ulcerative colitis s/p total colectomy w/ J pouch presenting with malaise    #Malaise and Fatigue  #COVID-19  -positive on 5/2/23, comfortable on room air, no longer needs contact   -PT consult appreciated - home w PT  -Nutrition consult appreciated    #Memory difficulties  -Steady decline over past 2 years  -Continue home Memantine  -F/U Head MRI    #Hypokalemia  -Repleting  -Continue to monitor    #Enteritis  -No abd pain, no change in stools  -ID consult appreciated, no abx required    #Hx of total colectomy with ileo anal anastomosis  -Colorectal Surgery consult appreciated    #Thrombocytosis  -improved    #Hyponatremia  -improving, most likely in the setting of poor PO intake. Will continue to monitor.     #Afib  -has loop recorder  -Continue home Metoprolol & Eliquis    #Hypothyroidism  -Continue home synthroid  -F/U AM TSH    #HTN  -Continue home Triamterene-HCTZ  -If Na does not improve, will consider D/C HCTZ    #Anxiety  #Depression  -Continue home Duloxetine     #Dvt ppx  Eliquis    #Code Status  Full Code    #Dispo  DC in AM if K corrected and MRI unremarkable     *Above discussed w Dr. Gaona
78 y/o female with h/o A.fib on Eliquis, hypothyroidism, HTN, recent COVID-19 viral syndrome on 5/2, anxiety, depression, osteoporosis, ulcerative colitis s/p total colectomy w/ J pouch, lumbar fusion L4-L5 was admitted on 5/23 for worsening fatigue, weakness, congestion w/ green mucous x1 week. Pt was was seen and prescribed abx by Dr. French on the day PTA, took first doses yesterday, but doesn't feel any better. Patient denies any dyspnea, nausea, chest pain, headaches or vision changes at this time. She believes she has brain fog, because she has not been herself. She also says she was having a change in her bowel movement which has been more soft, however as per , her stool is baseline soft. As per , patient's memory issues have been an issue for the past 3 years and she has not had any acute change. As per , she is prone to sundowning in the hospital. In ER she received zosyn.     1. Ulcerative colitis s/p subtotal colectomy. Probable enteritis with ileus. Acute bronchitis. Recent COVID-19 viral syndrome. Immunocompromised host.   -no respiratory distress or hypoxia  -no clinical signs of sepsis  -monitor abdomen  -would continue to observe off abx therapy   -monitor temps  -f/u CBC  -supportive care  2. Other issues:   -care per medicine

## 2023-05-25 NOTE — DISCHARGE NOTE PROVIDER - NSDCMRMEDTOKEN_GEN_ALL_CORE_FT
acetaminophen 325 mg oral tablet: 2 tab(s) orally every 6 hours As needed Temp greater or equal to 38C (100.4F), Mild Pain (1 - 3)  Acidophilus oral capsule: 1 cap(s) orally once a day  amiodarone 200 mg oral tablet: 1 tab(s) orally once a day (at bedtime)  Cymbalta 20 mg oral delayed release capsule: 1 cap(s) orally once a day ***take with 60mg for TDD = 80mg***  Cymbalta 60 mg oral delayed release capsule: 1 cap(s) orally once a day ***take with 20mg for TDD = 80mg***  Eliquis 5 mg oral tablet: 1 tab(s) orally 2 times a day  levothyroxine 25 mcg (0.025 mg) oral tablet: 1 tab(s) orally once a day  Melatonin 5 mg oral tablet: 1 tab(s) orally once a day (at bedtime), As Needed  memantine 10 mg oral tablet: 1 tab(s) orally 2 times a day  mercaptopurine 50 mg oral tablet: 1 tab(s) orally once a day (at bedtime)  metoprolol succinate 50 mg oral tablet, extended release: 1 tab(s) orally once a day  risedronate 35 mg oral tablet: 1 tab(s) orally once a week on Sundays  rosuvastatin 5 mg oral tablet: 1 tab(s) orally once a day (at bedtime)  triamterene-hydrochlorothiazide 37.5 mg-25 mg oral capsule: 1 cap(s) orally once a day   acetaminophen 325 mg oral tablet: 2 tab(s) orally every 6 hours As needed Temp greater or equal to 38C (100.4F), Mild Pain (1 - 3)  Acidophilus oral capsule: 1 cap(s) orally once a day  amiodarone 200 mg oral tablet: 1 tab(s) orally once a day (at bedtime)  Cymbalta 20 mg oral delayed release capsule: 1 cap(s) orally once a day ***take with 60mg for TDD = 80mg***  Cymbalta 60 mg oral delayed release capsule: 1 cap(s) orally once a day ***take with 20mg for TDD = 80mg***  Eliquis 5 mg oral tablet: 1 tab(s) orally 2 times a day  levothyroxine 25 mcg (0.025 mg) oral tablet: 1 tab(s) orally once a day  Melatonin 5 mg oral tablet: 1 tab(s) orally once a day (at bedtime), As Needed  memantine 10 mg oral tablet: 1 tab(s) orally 2 times a day  mercaptopurine 50 mg oral tablet: 1 tab(s) orally once a day (at bedtime)  metoprolol succinate 50 mg oral tablet, extended release: 1 tab(s) orally once a day  Protonix 40 mg oral delayed release tablet: 1 tab(s) orally once a day  risedronate 35 mg oral tablet: 1 tab(s) orally once a week on Sundays  rosuvastatin 5 mg oral tablet: 1 tab(s) orally once a day (at bedtime)  sucralfate 1 g oral tablet: 1 tab(s) orally every 6 hours 1 hour before meal  triamterene-hydrochlorothiazide 37.5 mg-25 mg oral capsule: 1 cap(s) orally once a day

## 2023-05-25 NOTE — DISCHARGE NOTE PROVIDER - HOSPITAL COURSE
78 y/o female w/ PMHx of Afib on Eliquis, hypothyroid, HTN, recent COVID 5/2, anxiety, depression, osteoporosis, ulcerative colitis s/p total colectomy w/ J pouch, h/o lumbar fusion L4-L5 presented to the ED w/  c/o worsening fatigue, weakness, congestion w/ green mucous x1 week. Pt was prescribed abx by Dr. French day prior to presentation, took first doses yesterday, but did not feel improvement. Was recommended by Dr. French to F/U in ED. In the ED, VSS. Pt with hyponatremia (later corrected with IVF), hypomagnesemia (repleted to normal). TSH, lacate, glucose wnl. COVID + (was postive on 5/2/23 as well) but comfortable on room air. CXR, CT head, EKG (w/ trop neg x2) unremarkable. CT abd showed fluid and feculent material in bowel, inflammatory changes in adjacent fat, and bowel distended with air. Zosyn was started in the ED. Pt was admitted for further management.  ID was consulted and recommended to observe off abx therapy.  Colorectal surgery was consulted and no acute intervention was indicated. MRI Head  unremarkable. ABG and ammonia unremarkable.     On day of discharge, pt seen and evaluated by beside.  Pt is medically stable to be discharged. Denies HA, SOB, CP, ABD pain, vision changes.     Vital Signs   T(C): 36.5 (25 May 2023 08:05), Max: 36.7 (24 May 2023 15:23)  T(F): 97.7 (25 May 2023 08:05), Max: 98 (24 May 2023 15:23)  HR: 72 (25 May 2023 08:05) (68 - 72)  BP: 119/81 (25 May 2023 08:05) (110/82 - 119/81)  RR: 17 (25 May 2023 08:05) (17 - 18)  SpO2: 100% (25 May 2023 08:05) (98% - 100%)  O2 Parameters below as of 25 May 2023 08:05  Patient On (Oxygen Delivery Method): room air    PHYSICAL EXAM:  Gen: NAD, comfortable  HENT: atraumatic head and ears, no gross abnormalities of ears, mucous membranes moist, no oral lesions, neck supple without masses/goiter/lymphadenopathy  CV: irregular rate and rhythm, no M/R/G  Pulm: nl respiratory effort, CTAB, no wheezes/crackles/rhonchi  Back: no scoliosis, lordosis, or kyphosis, no tenderness  Abd: normoactive bowel sounds in all 4 quadrants, soft, nontender, nondistended, no rebound, no guarding, no masses  Extremities: no pedal edema, pedal pulses palpable   Skin: nl warm and dry, no wounds   Neuro: A&Ox3, answering questions appropriately, PERRL, EOMI, face symmetric, sensation equal bilaterally in face, tongue midline  Pysch: no depression, no SI, no HI

## 2023-05-29 LAB
CULTURE RESULTS: SIGNIFICANT CHANGE UP
CULTURE RESULTS: SIGNIFICANT CHANGE UP
SPECIMEN SOURCE: SIGNIFICANT CHANGE UP
SPECIMEN SOURCE: SIGNIFICANT CHANGE UP

## 2023-06-09 DIAGNOSIS — Z79.01 LONG TERM (CURRENT) USE OF ANTICOAGULANTS: ICD-10-CM

## 2023-06-09 DIAGNOSIS — K51.90 ULCERATIVE COLITIS, UNSPECIFIED, WITHOUT COMPLICATIONS: ICD-10-CM

## 2023-06-09 DIAGNOSIS — F03.A0 UNSPECIFIED DEMENTIA, MILD, WITHOUT BEHAVIORAL DISTURBANCE, PSYCHOTIC DISTURBANCE, MOOD DISTURBANCE, AND ANXIETY: ICD-10-CM

## 2023-06-09 DIAGNOSIS — E87.6 HYPOKALEMIA: ICD-10-CM

## 2023-06-09 DIAGNOSIS — R53.81 OTHER MALAISE: ICD-10-CM

## 2023-06-09 DIAGNOSIS — E83.42 HYPOMAGNESEMIA: ICD-10-CM

## 2023-06-09 DIAGNOSIS — I48.91 UNSPECIFIED ATRIAL FIBRILLATION: ICD-10-CM

## 2023-06-09 DIAGNOSIS — Z88.8 ALLERGY STATUS TO OTHER DRUGS, MEDICAMENTS AND BIOLOGICAL SUBSTANCES: ICD-10-CM

## 2023-06-09 DIAGNOSIS — M81.0 AGE-RELATED OSTEOPOROSIS WITHOUT CURRENT PATHOLOGICAL FRACTURE: ICD-10-CM

## 2023-06-09 DIAGNOSIS — E87.1 HYPO-OSMOLALITY AND HYPONATREMIA: ICD-10-CM

## 2023-06-09 DIAGNOSIS — N17.9 ACUTE KIDNEY FAILURE, UNSPECIFIED: ICD-10-CM

## 2023-06-09 DIAGNOSIS — R53.83 OTHER FATIGUE: ICD-10-CM

## 2023-06-09 DIAGNOSIS — E43 UNSPECIFIED SEVERE PROTEIN-CALORIE MALNUTRITION: ICD-10-CM

## 2023-06-09 DIAGNOSIS — U07.1 COVID-19: ICD-10-CM

## 2023-06-09 DIAGNOSIS — F41.9 ANXIETY DISORDER, UNSPECIFIED: ICD-10-CM

## 2023-06-09 DIAGNOSIS — Z90.49 ACQUIRED ABSENCE OF OTHER SPECIFIED PARTS OF DIGESTIVE TRACT: ICD-10-CM

## 2023-06-09 DIAGNOSIS — R63.0 ANOREXIA: ICD-10-CM

## 2023-06-09 DIAGNOSIS — E03.9 HYPOTHYROIDISM, UNSPECIFIED: ICD-10-CM

## 2023-06-09 DIAGNOSIS — I10 ESSENTIAL (PRIMARY) HYPERTENSION: ICD-10-CM

## 2023-06-09 DIAGNOSIS — J20.8 ACUTE BRONCHITIS DUE TO OTHER SPECIFIED ORGANISMS: ICD-10-CM

## 2023-06-09 DIAGNOSIS — F32.A DEPRESSION, UNSPECIFIED: ICD-10-CM

## 2023-06-26 NOTE — DIETITIAN INITIAL EVALUATION ADULT - NSFNSPHYEXAMSKINFT_GEN_A_CORE
[FreeTextEntry1] : Bilateral CTS - reviewed pathoanatomy. Encouraged nighttime cockup wrist bracing. Reviewed bilateral UE EMG/NCV of bilateral mild CTS. Will continue to manage with nighttime bracing, can consider CTR should symptoms interfere with ADLs.\par \par F/u 4mo Cory score = 21, ecchymotic skin

## 2023-07-27 NOTE — PATIENT PROFILE ADULT - FUNCTIONAL ASSESSMENT - DAILY ACTIVITY 5.
[facility]    ASSESSMENT AND PLAN     1. Facial paralysis  Assessment & Plan:  Presents with history of facial paralysis - lasting 2 days (3.5 weeks ago). No further symptoms - remains asymptomatic today. Differential considered: [place] Palsy, stroke, TIA. Physical and neuro exam unremarkable in office today. Note PMH: CAD, HTN. Appreciate cardiology most recent note - 3/2023. Note smoking history - reports smoking <1ppd. Work up today with labs: CBC, CMP, TSH  Check head CT  Routine labs ordered as well   F/u in office for physical once completed to review  Strict ER precautions with any further symptoms    Orders:  -     CBC and differential; Future  -     Comprehensive metabolic panel; Future  -     CTA head w wo contrast; Future; Expected date: 07/27/2023    2. Screening for diabetes mellitus  -     Comprehensive metabolic panel; Future    3. Screening, anemia, deficiency, iron  -     CBC and differential; Future    4. Screening for thyroid disorder  -     TSH, 3rd generation with Free T4 reflex; Future    5. Need for hepatitis C screening test  -     Hepatitis C antibody; Future    6. Screening, lipid  -     Lipid panel; Future         SUBJECTIVE       Patient ID: Saadia Monteiro is a 70 y.o. male. Chief Complaint   Patient presents with   • Fatigue     Pt felt weak and just laying around and stated he had a cold. Pt states today he still feels a little weak. Also think he had some bells palsey (side of face was "paralyzed", tounge felt heavy, and a lot of drooling)       HISTORY OF PRESENT ILLNESS    Presents today with sister. Reports that 7/2. He started to not feel well and had excessive sweating - lasted for entire day. On 7/3, family noted his speech was "off" and that he had some left facial drooping. Reports his tongue felt "heavy". Notes he was drooling for periods of time. He refused medical evaluation because he was going to [place] the next day.  Sister reports his BP was and blood sugar was normal (family relative is an RN), No fever. He did travel next day without issue. Reports his facial droop resolved in about 2 days. He has had no further episodes. He developed a "cold" while in [place] (July 13/14) - took over the counter medication,  but did not seek treatment there either. Return back to [place] 7/18. States still with some intermittent weakness and still having some word finding issues but that is baseline for him and otherwise feels well. Family reports he has had a similar episode in the past (timeframe uncertain). He had a cardiac cath but was told every thing was "ok". He is over due for preventative care        The following portions of the patient's history were reviewed and updated as appropriate: allergies, current medications, past family history, past medical history, past social history, past surgical history, and problem list.    REVIEW OF SYSTEMS  Review of Systems   Constitutional: Negative. Negative for activity change, appetite change, fatigue and fever. HENT: Negative. Respiratory: Negative. Cardiovascular: Negative. Gastrointestinal: Negative. Genitourinary: Negative. Neurological: Negative. Negative for dizziness, tremors, syncope, weakness, numbness and headaches. Psychiatric/Behavioral: Negative.         OBJECTIVE      VITAL SIGNS  /90 (BP Location: Left arm, Patient Position: Sitting, Cuff Size: Adult)   Pulse 83   Temp (!) 97.3 °F (36.3 °C)   Ht 5' 2" (1.575 m)   Wt 73.5 kg (162 lb)   SpO2 96%   BMI 29.63 kg/m²     CURRENT MEDICATIONS    Current Outpatient Medications:   •  amLODIPine (NORVASC) 10 mg tablet, TAKE 1 TABLET BY MOUTH EVERY DAY, Disp: 90 tablet, Rfl: 1  •  aspirin 81 MG tablet, Take 1 tablet by mouth daily, Disp: , Rfl:   •  atorvastatin (LIPITOR) 80 mg tablet, Take 1 tablet (80 mg total) by mouth daily, Disp: 90 tablet, Rfl: 3  •  losartan (COZAAR) 25 mg tablet, Take 1 tablet (25 mg total) by mouth daily (Patient taking differently: Take 50 mg by mouth daily), Disp: 90 tablet, Rfl: 3  •  vitamin B-12 (CYANOCOBALAMIN) 2000 MCG TABS, Take 1 tablet by mouth daily, Disp: , Rfl:       PHYSICAL EXAMINATION   Physical Exam  Vitals and nursing note reviewed. Constitutional:       General: He is not in acute distress. Appearance: Normal appearance. He is well-developed and well-groomed. He is not ill-appearing. HENT:      Head: Normocephalic and atraumatic. Right Ear: Tympanic membrane, ear canal and external ear normal.      Left Ear: Tympanic membrane, ear canal and external ear normal.      Mouth/Throat:      Mouth: Mucous membranes are moist.   Eyes:      Extraocular Movements: Extraocular movements intact. Conjunctiva/sclera: Conjunctivae normal.      Pupils: Pupils are equal, round, and reactive to light. Neck:      Vascular: No carotid bruit. Cardiovascular:      Rate and Rhythm: Normal rate and regular rhythm. Pulses:           Posterior tibial pulses are 2+ on the right side and 2+ on the left side. Heart sounds: Normal heart sounds. Pulmonary:      Effort: Pulmonary effort is normal. No respiratory distress. Breath sounds: Normal breath sounds and air entry. Musculoskeletal:      Right lower leg: No edema. Left lower leg: No edema. Lymphadenopathy:      Cervical: No cervical adenopathy. Skin:     General: Skin is warm and dry. Neurological:      General: No focal deficit present. Mental Status: He is alert and oriented to person, place, and time. Cranial Nerves: Cranial nerves 2-12 are intact. Sensory: Sensation is intact. Motor: Motor function is intact. No weakness. Coordination: Coordination is intact. Gait: Gait is intact.    Psychiatric:         Attention and Perception: Attention normal.         Mood and Affect: Mood normal.         Speech: Speech normal.         Behavior: Behavior normal.         Thought Content:  Thought content normal.         Cognition and Memory: Cognition normal.         Judgment: Judgment normal.      Comments: Cognition appears intact/baseline 4 = No assist / stand by assistance

## 2023-08-03 ENCOUNTER — RX RENEWAL (OUTPATIENT)
Age: 78
End: 2023-08-03

## 2023-09-14 ENCOUNTER — APPOINTMENT (OUTPATIENT)
Dept: GASTROENTEROLOGY | Facility: CLINIC | Age: 78
End: 2023-09-14
Payer: MEDICARE

## 2023-09-14 VITALS
DIASTOLIC BLOOD PRESSURE: 90 MMHG | WEIGHT: 135 LBS | HEIGHT: 61.5 IN | BODY MASS INDEX: 25.16 KG/M2 | SYSTOLIC BLOOD PRESSURE: 136 MMHG

## 2023-09-14 PROCEDURE — 99214 OFFICE O/P EST MOD 30 MIN: CPT

## 2023-11-28 ENCOUNTER — APPOINTMENT (OUTPATIENT)
Dept: GASTROENTEROLOGY | Facility: CLINIC | Age: 78
End: 2023-11-28
Payer: MEDICARE

## 2023-11-28 VITALS
WEIGHT: 133 LBS | HEIGHT: 61.5 IN | DIASTOLIC BLOOD PRESSURE: 73 MMHG | BODY MASS INDEX: 24.79 KG/M2 | SYSTOLIC BLOOD PRESSURE: 125 MMHG

## 2023-11-28 DIAGNOSIS — R10.13 EPIGASTRIC PAIN: ICD-10-CM

## 2023-11-28 DIAGNOSIS — K31.9 DISEASE OF STOMACH AND DUODENUM, UNSPECIFIED: ICD-10-CM

## 2023-11-28 DIAGNOSIS — R14.2 ERUCTATION: ICD-10-CM

## 2023-11-28 PROCEDURE — 99214 OFFICE O/P EST MOD 30 MIN: CPT

## 2023-11-28 RX ORDER — PANTOPRAZOLE 40 MG/1
40 TABLET, DELAYED RELEASE ORAL DAILY
Qty: 30 | Refills: 5 | Status: ACTIVE | COMMUNITY
Start: 2023-11-28 | End: 1900-01-01

## 2023-12-13 ENCOUNTER — RESULT REVIEW (OUTPATIENT)
Age: 78
End: 2023-12-13

## 2023-12-13 ENCOUNTER — APPOINTMENT (OUTPATIENT)
Dept: GASTROENTEROLOGY | Facility: AMBULATORY MEDICAL SERVICES | Age: 78
End: 2023-12-13
Payer: MEDICARE

## 2023-12-13 ENCOUNTER — APPOINTMENT (OUTPATIENT)
Dept: GASTROENTEROLOGY | Facility: AMBULATORY MEDICAL SERVICES | Age: 78
End: 2023-12-13

## 2023-12-13 ENCOUNTER — NON-APPOINTMENT (OUTPATIENT)
Age: 78
End: 2023-12-13

## 2023-12-13 PROCEDURE — 43249 ESOPH EGD DILATION <30 MM: CPT | Mod: GC

## 2023-12-13 PROCEDURE — 43239 EGD BIOPSY SINGLE/MULTIPLE: CPT | Mod: GC,59

## 2023-12-13 NOTE — DIETITIAN INITIAL EVALUATION ADULT - WEIGHT FOR BMI (KG)
5PHYSICAL / OCCUPATIONAL THERAPY - DAILY TREATMENT NOTE (updated )    Patient Name: Mohan Recinos    Date: 2023    : 1958  Insurance: Payor: MEDICARE / Plan: MEDICARE PART A AND B / Product Type: *No Product type* /      Patient  verified yes     Visit #   Current / Total 24 36   Time   In / Out [id number]   Total Treatment Time 58   Pain   In / Out 0/10 0/10    Subjective Functional Status/Changes: She reports her only limitation is stairs and she has pain on the medial side. She talked to another Surgeon and he said it was because of tracking and quad weakness. TREATMENT AREA =  Left knee pain [M25.562]    OBJECTIVE    Modalities Rationale:     decrease edema and decrease inflammation to improve patient's ability to progress to PLOF and address remaining functional goals.      min [] Estim Unattended, type/location:                                      []  w/ice    []  w/heat    min [] Estim Attended, type/location:                                     []  w/US     []  w/ice    []  w/heat    []  TENS insruct      min []  Mechanical Traction: type/lbs                   []  pro   []  sup   []  int   []  cont    []  before manual    []  after manual    min []  Ultrasound, settings/location:      min []  Iontophoresis w/ dexamethasone, location:                                               []  take home patch       []  in clinic    min  unbilled []  Ice     []  Heat    location/position:     min []  Paraffin,  details:    PD min [x]  Vasopneumatic Device, press/temp: MP/LT   If using vaso (only need to measure limb vaso being performed on)      pre-treatment girth : 45 cm      post-treatment girth : 45cm        measured at (landmark location) :  mid patella     min []  Other:    Skin assessment post-treatment (if applicable):    []  intact    []  redness- no adverse reaction                 []redness - adverse reaction:      Vasopnuematic compression justification:  Per bilateral girth measures 60.3

## 2024-02-08 ENCOUNTER — APPOINTMENT (OUTPATIENT)
Dept: GASTROENTEROLOGY | Facility: CLINIC | Age: 79
End: 2024-02-08

## 2024-02-08 VITALS
HEIGHT: 61.5 IN | WEIGHT: 135 LBS | SYSTOLIC BLOOD PRESSURE: 130 MMHG | DIASTOLIC BLOOD PRESSURE: 90 MMHG | BODY MASS INDEX: 25.16 KG/M2

## 2024-02-08 DIAGNOSIS — R10.9 UNSPECIFIED ABDOMINAL PAIN: ICD-10-CM

## 2024-02-08 PROCEDURE — 99214 OFFICE O/P EST MOD 30 MIN: CPT

## 2024-03-22 ENCOUNTER — APPOINTMENT (OUTPATIENT)
Dept: ORTHOPEDIC SURGERY | Facility: CLINIC | Age: 79
End: 2024-03-22
Payer: MEDICARE

## 2024-03-22 VITALS
BODY MASS INDEX: 25.16 KG/M2 | SYSTOLIC BLOOD PRESSURE: 115 MMHG | WEIGHT: 135 LBS | DIASTOLIC BLOOD PRESSURE: 77 MMHG | HEIGHT: 61.5 IN | HEART RATE: 78 BPM

## 2024-03-22 DIAGNOSIS — Z96.651 PRESENCE OF RIGHT ARTIFICIAL KNEE JOINT: ICD-10-CM

## 2024-03-22 PROCEDURE — 99213 OFFICE O/P EST LOW 20 MIN: CPT

## 2024-03-22 PROCEDURE — 73562 X-RAY EXAM OF KNEE 3: CPT | Mod: 26,RT

## 2024-03-22 NOTE — PHYSICAL EXAM
[de-identified] : The patient is conversive and in no apparent distress. The patient is alert and oriented to person, place, and time. Affect and mood appear normal. The head is normocephalic and atraumatic. Skin shows normal turgor with no evidence of eczema or psoriasis. No respiratory distress noted. Sensation grossly intact. MUSCULOSKELETAL:   SEE BELOW  Right knee exam demonstrates a well-healed surgical incision.  No signs of acute trauma.  No effusions.  No soft tissue swelling.  neutral alignment.  No laxity with stress testing.  Passive range of motion 0 degrees of extension to 120 degrees of flexion.  Quadriceps and patella tendons are both intact.  No right hip pain with gentle range of motion of the hip.  No open wounds distally. When testing standing and balancing, Trendelenburg sign is noted on the right side [de-identified] : X-ray of the right knee was reviewed. Implants are in good position. No signs of loosening or fracture.

## 2024-03-22 NOTE — DISCUSSION/SUMMARY
[de-identified] : 25 minutes was spent reviewing the x-rays as well as discussing with the patient their clinical presentation, diagnosis and providing education. X-rays taken today of the knee reviewed with the patient.  Implants are in good position.  The patient has no right knee pain.  The patient's primary complaint is changes in balance.  She does have difficulty with balancing on a single right leg.  The patient does have pre-existing neurology follow-up established.  She is recommended to return to that office for continued testing.  She is also encouraged to utilize a cane when ambulating to prevent falls.  The explanation was explained to both the patient and her .  Both understood the discussion.  At this time the patient will be seen back at the 3-year anniversary which would be August 2025.  Should she have any other concerns or complaints before then she will be seen back sooner.

## 2024-03-22 NOTE — HISTORY OF PRESENT ILLNESS
[de-identified] : Patient presents today for evaluation of her right total knee replacement.  The patient is status post knee arthroplasty August 2022.  The patient and her  report that she has had some falls since the knee replacement.  She does report of some gait changes.  She has previously seen a neurologist.  She was recommended gait testing but never completed the testing.  She is not using a cane or walker.  She does not have any knee pain today.  She denies of any other related complaints.  Review of Systems- Constitutional: No fever or chills.  Cardiovascular: No orthopnea or chest pain Pulmonary: No shortness of breath.  GI: No nausea or vomiting or abdominal pain. Musculoskeletal: see HPI  Psychiatric: No anxiety and depression.

## 2024-04-01 NOTE — H&P PST ADULT - HISTORY OF PRESENT ILLNESS
72 Y.o . female presents here w/ long standing hx. of right knee pain 10/10 w/ decreased R.O.M., ambulation, mobility and ADL function. Seen by Dr. Warren and referred for surgery. Had Synvisc. and steroid Injection no help.  Per pt saw cardiologist and is receiving cardionet monitor prior to returning there for clearance Normal muscle tone/strength

## 2024-04-15 ENCOUNTER — APPOINTMENT (OUTPATIENT)
Dept: COLORECTAL SURGERY | Facility: CLINIC | Age: 79
End: 2024-04-15
Payer: MEDICARE

## 2024-04-15 VITALS
DIASTOLIC BLOOD PRESSURE: 74 MMHG | OXYGEN SATURATION: 93 % | HEART RATE: 92 BPM | HEIGHT: 61.5 IN | SYSTOLIC BLOOD PRESSURE: 107 MMHG | RESPIRATION RATE: 12 BRPM | WEIGHT: 133 LBS | BODY MASS INDEX: 24.79 KG/M2

## 2024-04-15 DIAGNOSIS — K91.858 OTHER COMPLICATIONS OF INTESTINAL POUCH: ICD-10-CM

## 2024-04-15 PROCEDURE — 99214 OFFICE O/P EST MOD 30 MIN: CPT

## 2024-04-15 NOTE — ASSESSMENT
[FreeTextEntry1] : 78 y.o female with hx of J pouch and anal stricture discussed with patient and her  about getting MRI for baseline evaluation. Patient had a successful dilatation in the past by .  She was seeking for his care and unfortunately was not able to find him and she decided to come to our care.  When I sat down and speak to her her quality of life is 3/10 and she is suffering.  With this finding I do believe the fact that we need to work her up with a MRI of the pelvis and a Gastrografin enema with emptying.  Granted there is a chance this stricture dilatations may not work in the long run and likely this is related to the one-stage pouch with mucosectomy and no ileostomy related complications I have seen many many times in the past.  I had reintroduced Dr. Rose to them and he will be taking over her care from now on and I will be available if my expertise is needed in the short run in the long run.  I spent 30 minutes of my time face-to-face explaining the pros and cons of this approach they are appreciative.

## 2024-04-15 NOTE — HISTORY OF PRESENT ILLNESS
[FreeTextEntry1] : 78 y.o female with hx of IPAA and former patient of Dr. Rose for a follow up  Saw Dr. Zafar River at [facility] She had her original jpouch in 1996 (Dr. Carmen) - she had 1 stage procedure with mucosectomy. She recently started requiring dilation. for 20 years she had only needed a few dilations.  She has 4 grandchildren.  She is uncomfortable going out and difficulty socializing due to her symptoms of pain and incomplete defecation. Her QOL is 3-4/10.  She is nauseated in the morning and belching often in the morning.   No weight loss.

## 2024-04-15 NOTE — PHYSICAL EXAM
[No Rash or Lesion] : No rash or lesion [Alert] : alert [Oriented to Person] : oriented to person [Oriented to Place] : oriented to place [Oriented to Time] : oriented to time [de-identified] : WDWN female, NAD [de-identified] : NC/AT [facility] [de-identified] : No C/C/E

## 2024-04-24 ENCOUNTER — OUTPATIENT (OUTPATIENT)
Dept: OUTPATIENT SERVICES | Facility: HOSPITAL | Age: 79
LOS: 1 days | End: 2024-04-24
Payer: MEDICARE

## 2024-04-24 ENCOUNTER — APPOINTMENT (OUTPATIENT)
Dept: RADIOLOGY | Facility: HOSPITAL | Age: 79
End: 2024-04-24

## 2024-04-24 ENCOUNTER — APPOINTMENT (OUTPATIENT)
Dept: MRI IMAGING | Facility: CLINIC | Age: 79
End: 2024-04-24
Payer: MEDICARE

## 2024-04-24 VITALS — WEIGHT: 132.06 LBS | HEIGHT: 61.22 IN

## 2024-04-24 DIAGNOSIS — Z95.818 PRESENCE OF OTHER CARDIAC IMPLANTS AND GRAFTS: Chronic | ICD-10-CM

## 2024-04-24 DIAGNOSIS — Z96.651 PRESENCE OF RIGHT ARTIFICIAL KNEE JOINT: Chronic | ICD-10-CM

## 2024-04-24 DIAGNOSIS — Z98.890 OTHER SPECIFIED POSTPROCEDURAL STATES: Chronic | ICD-10-CM

## 2024-04-24 DIAGNOSIS — Z92.89 PERSONAL HISTORY OF OTHER MEDICAL TREATMENT: Chronic | ICD-10-CM

## 2024-04-24 DIAGNOSIS — Z98.1 ARTHRODESIS STATUS: Chronic | ICD-10-CM

## 2024-04-24 DIAGNOSIS — Z90.89 ACQUIRED ABSENCE OF OTHER ORGANS: Chronic | ICD-10-CM

## 2024-04-24 DIAGNOSIS — K91.858 OTHER COMPLICATIONS OF INTESTINAL POUCH: ICD-10-CM

## 2024-04-24 DIAGNOSIS — Z90.49 ACQUIRED ABSENCE OF OTHER SPECIFIED PARTS OF DIGESTIVE TRACT: Chronic | ICD-10-CM

## 2024-04-24 DIAGNOSIS — Z90.711 ACQUIRED ABSENCE OF UTERUS WITH REMAINING CERVICAL STUMP: Chronic | ICD-10-CM

## 2024-04-24 DIAGNOSIS — I48.0 PAROXYSMAL ATRIAL FIBRILLATION: ICD-10-CM

## 2024-04-24 DIAGNOSIS — Z01.818 ENCOUNTER FOR OTHER PREPROCEDURAL EXAMINATION: ICD-10-CM

## 2024-04-24 LAB
ANION GAP SERPL CALC-SCNC: 14 MMOL/L — SIGNIFICANT CHANGE UP (ref 5–17)
BUN SERPL-MCNC: 19 MG/DL — SIGNIFICANT CHANGE UP (ref 7–23)
CALCIUM SERPL-MCNC: 10.2 MG/DL — SIGNIFICANT CHANGE UP (ref 8.4–10.5)
CHLORIDE SERPL-SCNC: 105 MMOL/L — SIGNIFICANT CHANGE UP (ref 96–108)
CO2 SERPL-SCNC: 21 MMOL/L — LOW (ref 22–31)
CREAT SERPL-MCNC: 0.67 MG/DL — SIGNIFICANT CHANGE UP (ref 0.5–1.3)
EGFR: 88 ML/MIN/1.73M2 — SIGNIFICANT CHANGE UP
GLUCOSE SERPL-MCNC: 99 MG/DL — SIGNIFICANT CHANGE UP (ref 70–99)
HCT VFR BLD CALC: 41 % — SIGNIFICANT CHANGE UP (ref 34.5–45)
HGB BLD-MCNC: 13.3 G/DL — SIGNIFICANT CHANGE UP (ref 11.5–15.5)
MCHC RBC-ENTMCNC: 30.4 PG — SIGNIFICANT CHANGE UP (ref 27–34)
MCHC RBC-ENTMCNC: 32.4 GM/DL — SIGNIFICANT CHANGE UP (ref 32–36)
MCV RBC AUTO: 93.8 FL — SIGNIFICANT CHANGE UP (ref 80–100)
NRBC # BLD: 0 /100 WBCS — SIGNIFICANT CHANGE UP (ref 0–0)
PLATELET # BLD AUTO: 213 K/UL — SIGNIFICANT CHANGE UP (ref 150–400)
POTASSIUM SERPL-MCNC: 3.9 MMOL/L — SIGNIFICANT CHANGE UP (ref 3.5–5.3)
POTASSIUM SERPL-SCNC: 3.9 MMOL/L — SIGNIFICANT CHANGE UP (ref 3.5–5.3)
RBC # BLD: 4.37 M/UL — SIGNIFICANT CHANGE UP (ref 3.8–5.2)
RBC # FLD: 18.6 % — HIGH (ref 10.3–14.5)
SODIUM SERPL-SCNC: 140 MMOL/L — SIGNIFICANT CHANGE UP (ref 135–145)
WBC # BLD: 4.56 K/UL — SIGNIFICANT CHANGE UP (ref 3.8–10.5)
WBC # FLD AUTO: 4.56 K/UL — SIGNIFICANT CHANGE UP (ref 3.8–10.5)

## 2024-04-24 PROCEDURE — 72197 MRI PELVIS W/O & W/DYE: CPT

## 2024-04-24 PROCEDURE — A9585: CPT

## 2024-04-24 PROCEDURE — 74270 X-RAY XM COLON 1CNTRST STD: CPT

## 2024-04-24 PROCEDURE — 72197 MRI PELVIS W/O & W/DYE: CPT | Mod: 26,MH

## 2024-04-24 PROCEDURE — 74270 X-RAY XM COLON 1CNTRST STD: CPT | Mod: 26

## 2024-04-24 PROCEDURE — G0463: CPT

## 2024-04-24 PROCEDURE — 85027 COMPLETE CBC AUTOMATED: CPT

## 2024-04-24 PROCEDURE — 80048 BASIC METABOLIC PNL TOTAL CA: CPT

## 2024-04-24 RX ORDER — TRIAMTERENE/HYDROCHLOROTHIAZID 75 MG-50MG
1 TABLET ORAL
Qty: 0 | Refills: 0 | DISCHARGE

## 2024-04-24 RX ORDER — DULOXETINE HYDROCHLORIDE 30 MG/1
1 CAPSULE, DELAYED RELEASE ORAL
Refills: 0 | DISCHARGE

## 2024-04-24 RX ORDER — AMIODARONE HYDROCHLORIDE 400 MG/1
1 TABLET ORAL
Refills: 0 | DISCHARGE

## 2024-04-24 NOTE — H&P PST ADULT - NSICDXPASTSURGICALHX_GEN_ALL_CORE_FT
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 at age 38 ovaries were spared 1984    S/P tonsillectomy and adenoidectomy     S/P total knee replacement, right      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

## 2024-04-24 NOTE — H&P PST ADULT - PROBLEM SELECTOR PLAN 1
Sx was scheduled originally for tomorrow (4/25). All surgical instructions have been reviewed and provided to pt and spouse.

## 2024-04-24 NOTE — H&P PST ADULT - HISTORY OF PRESENT ILLNESS
78 y.o female with hx of HTN, HLD, OA, Hypothyroid, Paroxysmal A-Fib (s/p multiple cardioversions on Eliquis; followed by Cards & EP), mild Dementia, Colitis (since age 11, s/p colectomy in 97') now w/ J pouch and anal stricture (s/p successful dilatation in the past by ) now presents to PST accompanied by spouse for a scheduled Exam Under Anesthesia Pouchoscopy on 4/25/2024. Denies recent fevers, chills, cough, chest pain or well otherwise,     *Pt states took Eliquis this AM. Email sent to Dr Rose, and as per response pt will need to be re-scheduled and hold Eliquis for 2days. Pt made aware and to call office to discuss/re-schedule* 78 y.o female with hx of HTN, HLD, OA, Hypothyroid, Paroxysmal A-Fib (s/p multiple cardioversions on Eliquis; followed by Cards & EP loop recorder in place), mild Dementia, Colitis (since age 11, s/p colectomy in 97') now w/ J pouch and anal stricture (s/p successful dilatation in the past by ) now presents to PST accompanied by spouse for a scheduled Exam Under Anesthesia Pouchoscopy on 4/25/2024. Denies recent fevers, chills, cough, chest pain or well otherwise,     *Pt states took Eliquis this AM. Email sent to Dr Rose, and as per response pt will need to be re-scheduled and hold Eliquis for 2days. Pt made aware and to call office to discuss/re-schedule*

## 2024-04-24 NOTE — H&P PST ADULT - PROBLEM SELECTOR PLAN 2
Yes On Eliquis. Email sent to Dr Rose, pt states took Eliquis this AM and was never told she needed to stop. As per Dr Rose pt needs to be re-scheduled.

## 2024-04-24 NOTE — H&P PST ADULT - NSHP PST SURGERY DATE_DT_GEN_A_CORE
Pt was informed by [facility] that the Estradiol cream needs to be increased to 4 grams . Pt is having difficulty with the cream  
25-Apr-2024

## 2024-04-24 NOTE — H&P PST ADULT - NSICDXPASTMEDICALHX_GEN_ALL_CORE_FT
PAST MEDICAL HISTORY:  Anxiety and depression     H/O insomnia     History of osteoarthritis right knee    HLD (hyperlipidemia)     Hypertension     Hypothyroid     Lumbar disc disorder     Mild dementia     Osteoporosis     Other complications of intestinal pouch     Paroxysmal atrial fibrillation     Scoliosis     Spinal stenosis     Spondylolisthesis     Ulcerative colitis signs of precancer had partial colectomy

## 2024-04-25 ENCOUNTER — APPOINTMENT (OUTPATIENT)
Dept: COLORECTAL SURGERY | Facility: CLINIC | Age: 79
End: 2024-04-25

## 2024-04-26 PROBLEM — K91.858 OTHER COMPLICATIONS OF INTESTINAL POUCH: Chronic | Status: ACTIVE | Noted: 2024-04-24

## 2024-04-26 PROBLEM — E78.5 HYPERLIPIDEMIA, UNSPECIFIED: Chronic | Status: ACTIVE | Noted: 2024-04-24

## 2024-04-26 PROBLEM — I48.0 PAROXYSMAL ATRIAL FIBRILLATION: Chronic | Status: ACTIVE | Noted: 2024-04-24

## 2024-04-30 ENCOUNTER — TRANSCRIPTION ENCOUNTER (OUTPATIENT)
Age: 79
End: 2024-04-30

## 2024-05-01 ENCOUNTER — RESULT REVIEW (OUTPATIENT)
Age: 79
End: 2024-05-01

## 2024-05-01 ENCOUNTER — TRANSCRIPTION ENCOUNTER (OUTPATIENT)
Age: 79
End: 2024-05-01

## 2024-05-01 ENCOUNTER — OUTPATIENT (OUTPATIENT)
Dept: OUTPATIENT SERVICES | Facility: HOSPITAL | Age: 79
LOS: 1 days | End: 2024-05-01
Payer: MEDICARE

## 2024-05-01 VITALS
DIASTOLIC BLOOD PRESSURE: 98 MMHG | WEIGHT: 132.06 LBS | SYSTOLIC BLOOD PRESSURE: 140 MMHG | HEART RATE: 110 BPM | TEMPERATURE: 98 F | OXYGEN SATURATION: 96 % | RESPIRATION RATE: 18 BRPM | HEIGHT: 61.22 IN

## 2024-05-01 VITALS
RESPIRATION RATE: 17 BRPM | SYSTOLIC BLOOD PRESSURE: 151 MMHG | TEMPERATURE: 98 F | HEART RATE: 99 BPM | OXYGEN SATURATION: 97 % | DIASTOLIC BLOOD PRESSURE: 86 MMHG

## 2024-05-01 DIAGNOSIS — Z90.49 ACQUIRED ABSENCE OF OTHER SPECIFIED PARTS OF DIGESTIVE TRACT: Chronic | ICD-10-CM

## 2024-05-01 DIAGNOSIS — Z90.711 ACQUIRED ABSENCE OF UTERUS WITH REMAINING CERVICAL STUMP: Chronic | ICD-10-CM

## 2024-05-01 DIAGNOSIS — Z98.890 OTHER SPECIFIED POSTPROCEDURAL STATES: Chronic | ICD-10-CM

## 2024-05-01 DIAGNOSIS — Z90.89 ACQUIRED ABSENCE OF OTHER ORGANS: Chronic | ICD-10-CM

## 2024-05-01 DIAGNOSIS — Z98.1 ARTHRODESIS STATUS: Chronic | ICD-10-CM

## 2024-05-01 DIAGNOSIS — Z96.651 PRESENCE OF RIGHT ARTIFICIAL KNEE JOINT: Chronic | ICD-10-CM

## 2024-05-01 DIAGNOSIS — K91.858 OTHER COMPLICATIONS OF INTESTINAL POUCH: ICD-10-CM

## 2024-05-01 DIAGNOSIS — Z95.818 PRESENCE OF OTHER CARDIAC IMPLANTS AND GRAFTS: Chronic | ICD-10-CM

## 2024-05-01 DIAGNOSIS — Z92.89 PERSONAL HISTORY OF OTHER MEDICAL TREATMENT: Chronic | ICD-10-CM

## 2024-05-01 PROCEDURE — 44386 ENDOSCOPY BOWEL POUCH/BIOP: CPT

## 2024-05-01 PROCEDURE — 45990 SURG DX EXAM ANORECTAL: CPT

## 2024-05-01 PROCEDURE — 88342 IMHCHEM/IMCYTCHM 1ST ANTB: CPT | Mod: 26

## 2024-05-01 PROCEDURE — 88342 IMHCHEM/IMCYTCHM 1ST ANTB: CPT

## 2024-05-01 PROCEDURE — 88305 TISSUE EXAM BY PATHOLOGIST: CPT

## 2024-05-01 PROCEDURE — 45905 DILATION OF ANAL SPHINCTER: CPT

## 2024-05-01 PROCEDURE — 88341 IMHCHEM/IMCYTCHM EA ADD ANTB: CPT

## 2024-05-01 PROCEDURE — 88305 TISSUE EXAM BY PATHOLOGIST: CPT | Mod: 26

## 2024-05-01 PROCEDURE — 88341 IMHCHEM/IMCYTCHM EA ADD ANTB: CPT | Mod: 26

## 2024-05-01 RX ORDER — FENTANYL CITRATE 50 UG/ML
50 INJECTION INTRAVENOUS ONCE
Refills: 0 | Status: DISCONTINUED | OUTPATIENT
Start: 2024-05-01 | End: 2024-05-01

## 2024-05-01 RX ORDER — FENTANYL CITRATE 50 UG/ML
25 INJECTION INTRAVENOUS
Refills: 0 | Status: DISCONTINUED | OUTPATIENT
Start: 2024-05-01 | End: 2024-05-01

## 2024-05-01 RX ORDER — ONDANSETRON 8 MG/1
4 TABLET, FILM COATED ORAL ONCE
Refills: 0 | Status: DISCONTINUED | OUTPATIENT
Start: 2024-05-01 | End: 2024-05-01

## 2024-05-01 NOTE — ASU DISCHARGE PLAN (ADULT/PEDIATRIC) - CARE PROVIDER_API CALL
Tom Rose  Colon/Rectal Surgery  [address] [place] [id number]  Phone: [phone]  Fax: [phone]  Follow Up Time:

## 2024-05-01 NOTE — ASU DISCHARGE PLAN (ADULT/PEDIATRIC) - ACCOMPANIED BY
Family

## 2024-05-01 NOTE — ASU PATIENT PROFILE, ADULT - FALL HARM RISK - HARM RISK INTERVENTIONS

## 2024-05-01 NOTE — BRIEF OPERATIVE NOTE - NSICDXBRIEFPROCEDURE_GEN_ALL_CORE_FT
PROCEDURES:  Ileoanal pouchoscopy 01-May-2024 16:16:27  Yony Truong  Exam under anesthesia, anus 01-May-2024 16:16:37  Yony Truong

## 2024-05-01 NOTE — ASU PATIENT PROFILE, ADULT - NSICDXPASTSURGICALHX_GEN_ALL_CORE_FT
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

## 2024-05-01 NOTE — ASU PREOP CHECKLIST - INTERNAL PROSTHESES
implanted loop recorder, lumbar hardware, right shoulder replacement, right total knee replacement/yes(specify)

## 2024-05-06 NOTE — ASSESSMENT
[FreeTextEntry1] : Plan:\par Pt denies warning symptoms, however would recommend modified barium swallow to ensure. Given instructions to schedule, all questions answered. Instructed pt to call if she experiences overt dysphagia in which case will also schedule EGD. All questions answered. Discussed with Dr. Almanza, I have spent 25 minutes on this encounter.  15

## 2024-05-09 LAB — SURGICAL PATHOLOGY STUDY: SIGNIFICANT CHANGE UP

## 2024-05-16 ENCOUNTER — APPOINTMENT (OUTPATIENT)
Dept: COLORECTAL SURGERY | Facility: CLINIC | Age: 79
End: 2024-05-16

## 2024-05-16 DIAGNOSIS — K62.4 STENOSIS OF ANUS AND RECTUM: ICD-10-CM

## 2024-05-16 DIAGNOSIS — E27.8 OTHER SPECIFIED DISORDERS OF ADRENAL GLAND: ICD-10-CM

## 2024-05-16 DIAGNOSIS — K51.90 ULCERATIVE COLITIS, UNSPECIFIED, W/OUT COMPLICATIONS: ICD-10-CM

## 2024-05-16 NOTE — REASON FOR VISIT
[Post Op: _________] : a [unfilled] post op visit [Home] : at home, [unfilled] , at the time of the visit. [Medical Office: ([facility])___] : at the medical office located in  [Patient] : the patient [Self] : self

## 2024-05-21 ENCOUNTER — APPOINTMENT (OUTPATIENT)
Dept: COLORECTAL SURGERY | Facility: CLINIC | Age: 79
End: 2024-05-21

## 2024-05-21 PROBLEM — K51.90 ULCERATIVE COLITIS: Status: ACTIVE | Noted: 2017-07-25

## 2024-05-21 PROBLEM — E27.8 ADRENAL MASS, LEFT: Status: ACTIVE | Noted: 2023-01-31

## 2024-05-21 NOTE — HISTORY OF PRESENT ILLNESS
[FreeTextEntry1] : 78 y.o female with hx of IPAA and former patient of Dr. Rose for a follow up Saw Dr. Zafar River at [place] She had her original jpouch in 1996 (Dr. Carmen) - she had 1 stage procedure with mucosectomy. She recently started requiring dilation. for 20 years she had only needed a few dilations.  She has 4 grandchildren. She is uncomfortable going out and difficulty socializing due to her symptoms of pain and incomplete defecation. Her QOL is 3-4/10.  She is nauseated in the morning and belching often in the morning.  No weight loss.  5/16/2024 -  She did not feel relief at all - after the EUA dilation. In the morning she is still bloating and nauseated. She was seeing a GI for the bloating and nausea. They saw him again after her procedure and no answer that was helpful.

## 2024-05-21 NOTE — DATA REVIEWED
[FreeTextEntry1] : 1  Small bowel j-pouch polyp biopsy  Final Diagnosis 1.  Small bowel/J-pouch, polyp, biopsy -   Scant superficial fragments of enteric mucosa and adipose tissue with degenerative changes.  See comment.  Verified by: Rex Sapp DO (Electronic Signature) Reported on: 05/09/24 12:37 EDT, [facility] [place] [address] Phone: [phone]   Fax: [phone] _________________________________________________________________  Comment  IHC stains demonstrate reactivity with CD34.  S100 and MDM2 are negative. The histologic findings of this minimal sample are non-specific, however, an inflammatory type polyp cannot be completely excluded. Clinical/endoscopic correlation recommended with repeat sampling if clinically indicated.

## 2024-05-21 NOTE — ASSESSMENT
[FreeTextEntry1] : 79 yo female with IPAA stricture s/p dilation, without much relief. Discussed she needs to follow up with Dr. Rose for another procedure. I advised to keep her self of a low residue/liquid diet. May need further surgical intervention if no relief.

## 2024-05-21 NOTE — REVIEW OF SYSTEMS
[Feeling Poorly] : feeling poorly [Feeling Tired] : feeling tired [Abdominal Pain] : abdominal pain [Constipation] : constipation [Pelvic Pain] : pelvic pain

## 2024-05-21 NOTE — PHYSICAL EXAM
[No Rash or Lesion] : No rash or lesion [Alert] : alert [Oriented to Person] : oriented to person [Oriented to Place] : oriented to place [Oriented to Time] : oriented to time [Calm] : calm [de-identified] : thing female, NAD [de-identified] : NC/AT [facility] [de-identified] : No C/C/E noted

## 2024-05-28 RX ORDER — MERCAPTOPURINE 50 MG/1
50 TABLET ORAL DAILY
Qty: 90 | Refills: 1 | Status: ACTIVE | COMMUNITY
Start: 1900-01-01 | End: 1900-01-01

## 2024-06-05 ENCOUNTER — TRANSCRIPTION ENCOUNTER (OUTPATIENT)
Age: 79
End: 2024-06-05

## 2024-06-06 ENCOUNTER — TRANSCRIPTION ENCOUNTER (OUTPATIENT)
Age: 79
End: 2024-06-06

## 2024-06-06 ENCOUNTER — APPOINTMENT (OUTPATIENT)
Dept: COLORECTAL SURGERY | Facility: HOSPITAL | Age: 79
End: 2024-06-06
Payer: MEDICARE

## 2024-06-06 ENCOUNTER — OUTPATIENT (OUTPATIENT)
Dept: INPATIENT UNIT | Facility: HOSPITAL | Age: 79
LOS: 1 days | End: 2024-06-06
Payer: MEDICARE

## 2024-06-06 VITALS
OXYGEN SATURATION: 98 % | SYSTOLIC BLOOD PRESSURE: 134 MMHG | RESPIRATION RATE: 14 BRPM | HEART RATE: 74 BPM | DIASTOLIC BLOOD PRESSURE: 77 MMHG

## 2024-06-06 VITALS
RESPIRATION RATE: 16 BRPM | SYSTOLIC BLOOD PRESSURE: 138 MMHG | TEMPERATURE: 98 F | HEART RATE: 74 BPM | OXYGEN SATURATION: 98 % | DIASTOLIC BLOOD PRESSURE: 86 MMHG

## 2024-06-06 DIAGNOSIS — Z92.89 PERSONAL HISTORY OF OTHER MEDICAL TREATMENT: Chronic | ICD-10-CM

## 2024-06-06 DIAGNOSIS — Z96.651 PRESENCE OF RIGHT ARTIFICIAL KNEE JOINT: Chronic | ICD-10-CM

## 2024-06-06 DIAGNOSIS — K91.858 OTHER COMPLICATIONS OF INTESTINAL POUCH: ICD-10-CM

## 2024-06-06 DIAGNOSIS — Z98.890 OTHER SPECIFIED POSTPROCEDURAL STATES: Chronic | ICD-10-CM

## 2024-06-06 DIAGNOSIS — Z90.49 ACQUIRED ABSENCE OF OTHER SPECIFIED PARTS OF DIGESTIVE TRACT: Chronic | ICD-10-CM

## 2024-06-06 DIAGNOSIS — Z95.818 PRESENCE OF OTHER CARDIAC IMPLANTS AND GRAFTS: Chronic | ICD-10-CM

## 2024-06-06 DIAGNOSIS — Z90.711 ACQUIRED ABSENCE OF UTERUS WITH REMAINING CERVICAL STUMP: Chronic | ICD-10-CM

## 2024-06-06 DIAGNOSIS — Z90.89 ACQUIRED ABSENCE OF OTHER ORGANS: Chronic | ICD-10-CM

## 2024-06-06 DIAGNOSIS — Z98.1 ARTHRODESIS STATUS: Chronic | ICD-10-CM

## 2024-06-06 PROCEDURE — 44385 ENDOSCOPY OF BOWEL POUCH: CPT

## 2024-06-06 PROCEDURE — 45990 SURG DX EXAM ANORECTAL: CPT

## 2024-06-06 RX ORDER — FENTANYL CITRATE 50 UG/ML
25 INJECTION INTRAVENOUS
Refills: 0 | Status: DISCONTINUED | OUTPATIENT
Start: 2024-06-06 | End: 2024-06-06

## 2024-06-06 RX ORDER — ROSUVASTATIN CALCIUM 5 MG/1
1 TABLET ORAL
Qty: 0 | Refills: 0 | DISCHARGE

## 2024-06-06 RX ORDER — RISEDRONATE SODIUM 25.8; 4.2 MG/1; MG/1
1 TABLET, FILM COATED ORAL
Refills: 0 | DISCHARGE

## 2024-06-06 RX ORDER — LANOLIN ALCOHOL/MO/W.PET/CERES
1 CREAM (GRAM) TOPICAL
Qty: 0 | Refills: 0 | DISCHARGE

## 2024-06-06 RX ORDER — MERCAPTOPURINE 50 MG/1
1 TABLET ORAL
Refills: 0 | DISCHARGE

## 2024-06-06 RX ORDER — LACTOBACILLUS ACIDOPHILUS 100MM CELL
1 CAPSULE ORAL
Refills: 0 | DISCHARGE

## 2024-06-06 RX ORDER — ONDANSETRON 8 MG/1
4 TABLET, FILM COATED ORAL ONCE
Refills: 0 | Status: DISCONTINUED | OUTPATIENT
Start: 2024-06-06 | End: 2024-06-06

## 2024-06-06 RX ORDER — METOPROLOL TARTRATE 50 MG
1 TABLET ORAL
Qty: 0 | Refills: 0 | DISCHARGE

## 2024-06-06 RX ORDER — LEVOTHYROXINE SODIUM 125 MCG
1 TABLET ORAL
Refills: 0 | DISCHARGE

## 2024-06-06 RX ADMIN — Medication 1 ENEMA: at 09:21

## 2024-06-06 NOTE — BRIEF OPERATIVE NOTE - OPERATION/FINDINGS
Exam under anesthesia. Anal stricture dilated to breadth of index finger. Flexible pouchoscopy. Healthy appearing pouch with re-demonstrated polyp, previously biopsied.

## 2024-06-06 NOTE — PRE-OP CHECKLIST - ORDERS/MEDICATION ADMINISTRATION RECORD ON CHART
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  Does have depression and anxiety but states he does not  Feel it is related- spoke with patient about how it can make fatigue symptoms worse  Does not drink enough water- will increase fluids, exercise, start mutlivitamin +d and see how he is feeling in a few months  Sleep hygiene reviewed  The following portions of the patient's history were reviewed and updated as appropriate: allergies, current medications, past family history, past medical history, past social history, past surgical history and problem list     Review of Systems   Constitutional: Positive for fatigue  Negative for activity change, appetite change, diaphoresis, fever and unexpected weight change  HENT: Negative for congestion, mouth sores, rhinorrhea, sinus pain, trouble swallowing and voice change  Eyes: Negative for photophobia and visual disturbance  Respiratory: Negative for apnea, cough, chest tightness, shortness of breath and wheezing  Cardiovascular: Negative for chest pain, palpitations and leg swelling  Gastrointestinal: Negative for abdominal distention, abdominal pain, blood in stool, constipation, diarrhea, nausea and vomiting  Endocrine: Negative for cold intolerance, heat intolerance, polydipsia, polyphagia and polyuria  Genitourinary: Negative for decreased urine volume, difficulty urinating, frequency and urgency  Musculoskeletal: Negative for arthralgias, myalgias, neck pain and neck stiffness     Skin: Negative for color change, rash and wound  Neurological: Negative for dizziness, weakness, light-headedness, numbness and headaches  Hematological: Negative for adenopathy  Does not bruise/bleed easily  Psychiatric/Behavioral: Negative for self-injury, sleep disturbance and suicidal ideas  The patient is not nervous/anxious  Objective:      /60 (BP Location: Left arm, Patient Position: Sitting, Cuff Size: Standard)   Pulse 78   Temp 97 6 °F (36 4 °C) (Tympanic)   Ht 5' 9" (1 753 m)   Wt 66 2 kg (146 lb)   SpO2 96%   BMI 21 56 kg/m²          Physical Exam  Vitals signs and nursing note reviewed  Constitutional:       General: He is not in acute distress  Appearance: Normal appearance  He is not ill-appearing or toxic-appearing  HENT:      Head: Normocephalic and atraumatic  Right Ear: Tympanic membrane, ear canal and external ear normal       Left Ear: Tympanic membrane, ear canal and external ear normal       Mouth/Throat:      Mouth: Mucous membranes are moist       Pharynx: Oropharynx is clear  No oropharyngeal exudate or posterior oropharyngeal erythema  Eyes:      Extraocular Movements: Extraocular movements intact  Conjunctiva/sclera: Conjunctivae normal       Pupils: Pupils are equal, round, and reactive to light  Neck:      Musculoskeletal: Normal range of motion and neck supple  No neck rigidity or muscular tenderness  Cardiovascular:      Rate and Rhythm: Normal rate and regular rhythm  Pulses: Normal pulses  Heart sounds: Normal heart sounds  No murmur  No friction rub  No gallop  Pulmonary:      Effort: Pulmonary effort is normal  No respiratory distress  Breath sounds: Normal breath sounds  No stridor  No wheezing  Abdominal:      General: Abdomen is flat  Bowel sounds are normal       Palpations: Abdomen is soft  Skin:     General: Skin is warm and dry  Capillary Refill: Capillary refill takes less than 2 seconds        Coloration: Skin is not jaundiced or pale  Findings: No bruising  Neurological:      General: No focal deficit present  Mental Status: He is alert and oriented to person, place, and time  Mental status is at baseline  Psychiatric:         Mood and Affect: Mood normal          Behavior: Behavior normal          Thought Content:  Thought content normal          Judgment: Judgment normal  done

## 2024-06-06 NOTE — H&P ADULT - PATIENT'S GENDER IDENTITY
[facility] Internal Medicine Progress Note  Patient: Varun Patel 35 y o  female   MRN: [id number]  PCP: David Magana MD  Unit/Bed#: [address] [place] [address] Encounter: [id number]  Date Of Visit: 02/07/19    Problem List:    Principal Problem:    Acute pancreatitis  Active Problems:    Cholelithiasis    Morbid obesity ([facility] [address] )    Alcohol abuse    UTI (urinary tract infection)    Hypokalemia    Alcohol withdrawal delirium, acute, hyperactive ([facility] [address] )    Acute respiratory failure with hypoxemia ([facility] [address] )      Assessment & Plan:      1  Acute pancreatitis: likely 2/2 EtOH abuse; however, CT abd/pelvis also revealed cholelithiasis w/o evidence of cholecystitis    TG WNL    Lipase trending up    Resume IVF NS at 125 mL/hour    For NM hepatobiliary - HIDA scan today     NPO again    Dilaudid PRN pain    F/u GI recs   Surgery consult   2  EtOH abuse w/withdrawal and delirium: Initially required Precedex gtt    c/w standing Librium as per [place] protocol  - taper    C/w thiamine and folic acid    F/u psychiatry outpatient   3  Acute hypoxic respiratory failure 2/2 atelectasis: resolved    4  Morbid obesity: likely 2/2 excess calorie intake    Encourage lifestyle modification including diet and exercise once medically stable   5  Hypokalemia: persistent, replete aggressively w/KCl   6  Elevated T bili and alk phos: trending down, management as above       VTE Pharmacologic Prophylaxis:   Pharmacologic: Heparin  Mechanical VTE Prophylaxis in Place: Yes    Patient Centered Rounds: I have performed bedside rounds with nursing staff today  Discussions with Specialists or Other Care Team Provider: Yes    Education and Discussions with Family / Patient:Yes    Time Spent for Care: 30 minutes  More than 50% of total time spent on counseling and coordination of care as described above      Current Length of Stay: 8 day(s)    Current Patient Status: Inpatient     Discharge Plan: xxxxx     Code Status: Level 1 - Full Code    Certification Statement: The patient will continue to require additional inpatient hospital stay due to management of acute pancreatitis       Subjective:   Reports 5/10 RUQ and occasional epigastric abdominal pain - sometimes sharp, sometimes dull  No nausea/vomiting    Now reports she has been able to tolerate p o  Very well  Still awaiting for amylase lipase  Objective:         Negative for Chest Pain, Palpitations, Shortness of Breath, Nausea, Vomiting, Constipation, Diarrhea, Dizziness  All other 10 review of systems negative and without drastic interval changes from yesterday  Vitals:   Temp (24hrs), Av °F (36 7 °C), Min:97 5 °F (36 4 °C), Max:98 6 °F (37 °C)    Temp:  [97 5 °F (36 4 °C)-98 6 °F (37 °C)] 98 3 °F (36 8 °C)  HR:  [] 110  Resp:  [18] 18  BP: ()/(51-88) 119/88  SpO2:  [91 %-97 %] 94 %  Body mass index is 51 49 kg/m²  Input and Output Summary (last 24 hours): Intake/Output Summary (Last 24 hours) at 19 1419  Last data filed at 19 0827   Gross per 24 hour   Intake          5279 17 ml   Output                0 ml   Net          5279 17 ml       Physical Exam:     Physical Exam   Constitutional: No distress  Morbidly obese   HENT:   Head: Normocephalic and atraumatic  Nose: Nose normal    Eyes: Pupils are equal, round, and reactive to light  Conjunctivae are normal    Neck: Normal range of motion  Neck supple  Cardiovascular: Normal rate, regular rhythm and normal heart sounds  Pulmonary/Chest: Effort normal and breath sounds normal  No respiratory distress  She has no wheezes  She has no rales  Abdominal: Soft  Bowel sounds are normal  She exhibits no distension  There is tenderness  There is no rebound and no guarding  RUQ tenderness - no rebound, no guarding, no rigidity    Musculoskeletal: She exhibits no edema  Neurological: She is alert  No cranial nerve deficit  Skin: Skin is warm and dry  No rash noted     Purpura improving    Psychiatric:   Flat affect Vitals reviewed  Additional Data:     Labs:      Results from last 7 days  Lab Units 02/06/19  0800  02/03/19  0518   WBC Thousand/uL 7 40  < > 9 27   HEMOGLOBIN g/dL 13 3  < > 12 9   HEMATOCRIT % 40 4  < > 38 3   PLATELETS Thousands/uL 209  < > 158   NEUTROS PCT %  --   --  73   LYMPHS PCT %  --   --  13*   LYMPHO PCT % 12*  < >  --    MONOS PCT %  --   --  9   MONO PCT % 7  < >  --    EOS PCT % 3  < > 2   < > = values in this interval not displayed  Results from last 7 days  Lab Units 02/07/19  0534   POTASSIUM mmol/L 4 6   CHLORIDE mmol/L 103   CO2 mmol/L 21   BUN mg/dL 5   CREATININE mg/dL 0 45*   CALCIUM mg/dL 7 7*   ALK PHOS U/L 136*   ALT U/L 26   AST U/L 52*           * I Have Reviewed All Lab Data Listed Above  * Additional Pertinent Lab Tests Reviewed: All Labs Within Last 24 Hours Reviewed    Imaging:  Us Abdomen Complete    Result Date: 2/4/2019  Narrative: ABDOMEN ULTRASOUND, COMPLETE INDICATION:   RUQ PAIN, NO FEVER, NO ELEVATED WBC R/O cholecystitis  COMPARISON: Abdomen ultrasound from 9/26/2018, and abdomen pelvic CT from 1/30/2019  TECHNIQUE:   Real-time ultrasound of the abdomen was performed with a curvilinear transducer with both volumetric sweeps and still imaging techniques  FINDINGS: PANCREAS:  Visualized portions of the pancreas are within normal limits  AORTA AND IVC:  Visualized portions are normal for patient age  LIVER: Size:  Mildly enlarged  The liver measures 18 2 cm in the midclavicular line  Contour:  Surface contour is smooth  Parenchyma:  Echogenicity and echotexture are within normal limits  No evidence of suspicious mass  Limited imaging of the main portal vein shows it to be patent and hepatopetal  BILIARY: The gallbladder is normal in caliber  Gallbladder wall mildly thickened at 3 mm  No pericholecystic fluid  Multiple small gallstones in the gallbladder   Sonographic Cee's sign cannot be accurately assessed because patient had recent pain medication administration, thus limiting ultrasound assessment of acute cholecystitis  No intrahepatic biliary dilatation  CBD measures 5 mm  No choledocholithiasis  KIDNEY: Right kidney measures 11 9 cm  Within normal limits  Left kidney measures 12 9 cm  Within normal limits  SPLEEN: Measures 12 7 x 11 6 x 6 4 cm  Within normal limits  ASCITES:  None  Impression: Multiple small gallstones in the gallbladder, and mild gallbladder thickening at 3 mm  There is no gallbladder distention or pericholecystic fluid  Sonographic Cee's sign cannot be accurately assessed because patient had recent pain medication administration, thus limiting ultrasound assessment of acute cholecystitis  Workstation performed: [id number]     Xr Chest Portable Icu    Result Date: 2/2/2019  Narrative: CHEST INDICATION:   tachypnea  COMPARISON:  None EXAM PERFORMED/VIEWS:  XR CHEST PORTABLE ICU FINDINGS: Heart top normal in size  Pulmonary vessels prominent and indistinct  Right lower lobe subsegmental atelectasis  Lungs and pleural spaces otherwise clear  No evidence of pneumothorax  Osseous structures appear within normal limits for patient age  Impression: 1  Pulmonary vascular congestion  2   Right lower lobe subsegmental atelectasis  Workstation performed: [id number]     Ct Abdomen Pelvis With Contrast    Result Date: 1/30/2019  Narrative: CT ABDOMEN AND PELVIS WITH IV CONTRAST INDICATION:   Abdominal pain, unspecified  COMPARISON:  CT abdomen and pelvis 9/25/2018  TECHNIQUE:  CT examination of the abdomen and pelvis was performed  Axial, sagittal, and coronal 2D reformatted images were created from the source data and submitted for interpretation  Radiation dose length product (DLP) for this visit:  2371 mGy-cm     This examination, like all CT scans performed in the [facility], was performed utilizing techniques to minimize radiation dose exposure, including the use of iterative reconstruction and automated exposure control  IV Contrast:  100 mL of iohexol (OMNIPAQUE) Enteric Contrast:  Enteric contrast was not administered  FINDINGS: ABDOMEN LOWER CHEST:  Mild patchy atelectasis in the lower lobes, lingula and right middle lobe  LIVER/BILIARY TREE:  Stable hepatomegaly with steatosis  GALLBLADDER:  There are gallstone(s) within the gallbladder, without pericholecystic inflammatory changes  SPLEEN:  Unremarkable  PANCREAS:  Extensive edematous change in the pancreatic head and uncinate process with surrounding fat stranding and ill-defined peripancreatic fluid consistent with acute pancreatitis  There is mild peripancreatic fat stranding in the body and tail of the pancreas  There is ill-defined fluid in the anterior pararenal space and some of the fluid extends inferiorly in the retroperitoneum  There is inflammatory change about the 2nd and 3rd portions of the duodenum  No organized pancreatic or peripancreatic fluid collection  ADRENAL GLANDS:  Unremarkable  KIDNEYS/URETERS:  One or more sharply circumscribed subcentimeter renal hypodensities are noted  These lesions are too small to accurately characterize, but are statistically most likely to represent benign cortical renal cyst(s)  According to the guidelines published in the [employer] Paper of the ACR Incidental Findings Committee (Radiology 2010), no further workup of these lesions is recommended  No hydronephrosis  STOMACH AND BOWEL:  Postsurgical changes of gastric bypass  No bowel obstruction  APPENDIX:  No findings to suggest appendicitis  ABDOMINOPELVIC CAVITY:  No ascites or free intraperitoneal air  No lymphadenopathy  VESSELS:  Unremarkable for patient's age  PELVIS REPRODUCTIVE ORGANS:  Unremarkable for patient's age  URINARY BLADDER:  Unremarkable  ABDOMINAL WALL/INGUINAL REGIONS:  Stable ventral abdominal wall hernia containing omental fat  OSSEOUS STRUCTURES:  No acute fracture or destructive osseous lesion  Impression: 1    Acute pancreatitis  No organized pancreatic or peripancreatic fluid collection  2   Cholelithiasis without evidence of cholecystitis  3   Stable hepatomegaly with steatosis  The study was marked in [facility] for immediate notification  Workstation performed: [id number]     Imaging Reports Reviewed by myself    Cultures:   Blood Culture: No results found for: BLOODCX  Urine Culture: No results found for: URINECX  Sputum Culture: No components found for: SPUTUMCX  Wound Culture: No results found for: WOUNDCULT    Last 24 Hours Medication List:     Current Facility-Administered Medications:  albuterol 2 puff Inhalation Q4H PRN Aries Clifford DO    chlordiazePOXIDE 25 mg Oral Q8H 900 Quinton Lovelace MD    clindamycin 900 mg Intravenous Once Cameron Llamas MD    heparin (porcine) 5,000 Units Subcutaneous Q8H [address] Selvin Phillips MD    HYDROmorphone 0 5 mg Intravenous Q4H PRN Connie Guardado MD    metroNIDAZOLE 500 mg Intravenous Once Cameron Llamas MD    ondansetron 4 mg Intravenous Q4H PRN Selvin Phillips MD    oxyCODONE 10 mg Oral Q4H PRN Jaalexandra Plane ANTHONY Arevalo    oxyCODONE 5 mg Oral Q4H PRN Jaalexandra Plane ANTHONY Arevalo    sodium chloride 1 spray Each Nare Q1H PRN Selvin Phillips MD    IVPB builder  Intravenous Q24H Selvin Phillips MD Last Rate: 100 mL/hr at 02/06/19 1620        Today, Patient Was Seen By: [employer]    ** Please Note: "This note has been constructed using a voice recognition system  Therefore there may be syntax, spelling, and/or grammatical errors   Please call if you have any questions  "** Female

## 2024-06-06 NOTE — BRIEF OPERATIVE NOTE - NSICDXBRIEFPROCEDURE_GEN_ALL_CORE_FT
PROCEDURES:  Exam under anesthesia, anus 06-Jun-2024 10:24:22  Blanche Mead  Ileoanal pouchoscopy 06-Jun-2024 10:24:30  Blanche Mead

## 2024-06-06 NOTE — H&P ADULT - NSHPPHYSICALEXAM_GEN_ALL_CORE
T(C): 36.9 (06-06-24 @ 08:28), Max: 36.9 (06-06-24 @ 08:28)  HR: 74 (06-06-24 @ 08:28) (74 - 74)  BP: 138/86 (06-06-24 @ 08:28) (138/86 - 138/86)  RR: 16 (06-06-24 @ 08:28) (16 - 16)  SpO2: 98% (06-06-24 @ 08:28) (98% - 98%)    CONSTITUTIONAL: Well groomed, no apparent distress  EYES: EOMI, No conjunctival or scleral injection, non-icteric  NECK: Supple, symmetric and without tracheal deviation   RESP: No respiratory distress, no use of accessory muscles; Symmetric chest rise  CV: RRR, +S1S2, no peripheral edema  GI: Soft, NT, ND, no rebound, no guarding; no palpable masses; no hepatosplenomegaly; no hernia palpated  SKIN: No rashes or ulcers noted; no subcutaneous nodules or induration palpable  PSYCH: Appropriate insight/judgment; A+O x 3, mood and affect appropriate, recent/remote memory intact

## 2024-06-06 NOTE — ASU PATIENT PROFILE, ADULT - FALL HARM RISK - RISK INTERVENTIONS

## 2024-06-06 NOTE — ASU DISCHARGE PLAN (ADULT/PEDIATRIC) - CARE PROVIDER_API CALL
Tom Rose  Colon/Rectal Surgery  [address] [place] [id number]  Phone: [phone]  Fax: [phone]  Established Patient  Follow Up Time: 2 weeks

## 2024-06-06 NOTE — H&P ADULT - ASSESSMENT
78 y.o female with hx of HTN, HLD, OA, Hypothyroid, Paroxysmal A-Fib (s/p multiple cardioversions on Eliquis; followed by Cards & EP loop recorder in place), mild Dementia, Hx of Ulcerative Colitis (since age 11, s/p colectomy in 97') now w/ J pouch and anal stricture (s/p successful dilatation in the past by ) now presents accompanied by spouse for a scheduled Exam Under Anesthesia Pouchoscopy. Pt reports she last took Eliquis Zeke Night 6/2/2024.      -NPO  -IVF  -Fleets enema x 2  -Consent in chart

## 2024-06-06 NOTE — H&P ADULT - HISTORY OF PRESENT ILLNESS
78 y.o female with hx of HTN, HLD, OA, Hypothyroid, Paroxysmal A-Fib (s/p multiple cardioversions on Eliquis; followed by Cards & EP loop recorder in place), mild Dementia, Colitis (since age 11, s/p colectomy in 97') now w/ J pouch and anal stricture (s/p successful dilatation in the past by ) now presents to [facility] accompanied by spouse for a scheduled Exam Under Anesthesia Pouchoscopy on 4/25/2024. Denies recent fevers, chills, cough, chest pain or well otherwise,      78 y.o female with hx of HTN, HLD, OA, Hypothyroid, Paroxysmal A-Fib (s/p multiple cardioversions on Eliquis; followed by Cards & EP loop recorder in place), mild Dementia, Hx of Ulcerative Colitis (since age 11, s/p colectomy in 97') now w/ J pouch and anal stricture (s/p successful dilatation in the past by ) now presents accompanied by spouse for a scheduled Exam Under Anesthesia Pouchoscopy. Pt with complaint of abdominal bloating, denies cramping, blood in stool or stool incontinence. Pt was previously scheduled for pouchoscopy in April but was postponed since she was on Eliquis and did not interrupt therapy until day of procedure. Pt reports she last took Eliquis Sunday Night 6/2/2024.  Denies recent fevers, chills, cough, chest pain.

## 2024-06-14 ENCOUNTER — NON-APPOINTMENT (OUTPATIENT)
Age: 79
End: 2024-06-14

## 2024-07-20 NOTE — PRE-OP CHECKLIST - SITE MARKED BY SURGEON
Patient Seen in: [facility] Emergency Department      History     Chief Complaint   Patient presents with    Arm or Hand Injury     Stated Complaint: ARM INJ    Subjective:   HPI    14-year-old male who is here with right elbow injury.  He was fielding a ball from first base when he slipped and the runner stepped on his right elbow.  Had some mild pain that worsened after he hit during the next inning.  He also pitched a game yesterday.    Objective:   Past Medical History:    Fracture    Right clavicle fx              History reviewed. No pertinent surgical history.             Social History     Socioeconomic History    Marital status: Single   Tobacco Use    Smoking status: Never    Smokeless tobacco: Never   Vaping Use    Vaping status: Never Used   Substance and Sexual Activity    Alcohol use: Never    Drug use: Never              Review of Systems    Positive for stated Chief Complaint: Arm or Hand Injury    Other systems are as noted in HPI.  Constitutional and vital signs reviewed.      All other systems reviewed and negative except as noted above.    Physical Exam     ED Triage Vitals [07/20/24 1757]   /71   Pulse 60   Resp 18   Temp 98.4 °F (36.9 °C)   Temp src Temporal   SpO2 100 %   O2 Device None (Room air)       Current Vitals:   Vital Signs  BP: 121/71  Pulse: 60  Resp: 18  Temp: 98.4 °F (36.9 °C)  Temp src: Temporal    Oxygen Therapy  SpO2: 100 %  O2 Device: None (Room air)            Physical Exam  Constitutional:       General: He is not in acute distress.     Appearance: He is well-developed. He is not diaphoretic.   HENT:      Head: Normocephalic and atraumatic.      Right Ear: External ear normal.      Left Ear: External ear normal.      Nose: Nose normal.      Mouth/Throat:      Mouth: Mucous membranes are moist.   Eyes:      Conjunctiva/sclera: Conjunctivae normal.      Pupils: Pupils are equal, round, and reactive to light.   Pulmonary:      Effort: Pulmonary effort is normal.    Abdominal:      General: Abdomen is flat.   Musculoskeletal:         General: Tenderness and signs of injury present. No swelling or deformity.      Cervical back: Normal range of motion and neck supple.      Comments: Diffuse bony right elbow tenderness.  No swelling or deformity.  Neurovascular intact.   Skin:     General: Skin is warm.      Coloration: Skin is not pale.   Neurological:      General: No focal deficit present.      Mental Status: He is alert and oriented to person, place, and time.   Psychiatric:         Mood and Affect: Mood normal.         Behavior: Behavior normal.         ED Course   Labs Reviewed - No data to display          Medications administered:  Medications - No data to display    Pulse oximetry:  Pulse oximetry on room air is 100% and is normal.     Cardiac monitoring:  Initial heart rate is 60 and is normal for age    Vital signs:  Vitals:    07/20/24 1757   BP: 121/71   Pulse: 60   Resp: 18   Temp: 98.4 °F (36.9 °C)   TempSrc: Temporal   SpO2: 100%   Weight: 71.3 kg     Chart review:  ^^ Review of prior external notes from unique sources (non-[facility] ED records):       Radiology:  Imaging independently visualized and interpreted by myself, along with review of radiology interpretation.   Noted following findings: no fractures    XR ELBOW, COMPLETE (MIN 3 VIEWS), RIGHT (CPT=73080)    Result Date: 7/20/2024  CONCLUSION:  No evidence of acute displaced fracture or dislocation.   LOCATION:  [facility]    Dictated by (CST): Stromberg, LeRoy, MD on 7/20/2024 at 6:44 PM     Finalized by (CST): Stromberg, LeRoy, MD on 7/20/2024 at 6:44 PM               MDM      Assessment & Plan:    14 year old male with right elbow injury.  Tenderness diffusely to the elbow.  X-ray negative for fracture.  Diagnosis of elbow contusion.  Sling given.  Motrin or Tylenol for pain as needed.        ^^ Independent historian: parent  ^^ Prescription drug and OTC medication management considerations: as noted  above      Patient or caregiver understands the course of events that occurred in the emergency department. Instructed to return to emergency department or contact PCP for persistent, recurrent, or worsening symptoms.    This report has been produced using speech recognition software and may contain errors related to that system including, but not limited to, errors in grammar, punctuation, and spelling, as well as words and phrases that possibly may have been recognized inappropriately.  If there are any questions or concerns, contact the dictating provider for clarification.     NOTE: The 21st Century Cares Act makes medical notes available to patients.  Be advised that this is a medical document written in medical language and may contain abbreviations or verbiage that is unfamiliar or direct.  It is primarily intended to carry relevant historical information, physical exam findings, and the clinical assessment of the physician.                                    Medical Decision Making  Problems Addressed:  Contusion of right elbow, initial encounter: acute illness or injury with systemic symptoms    Amount and/or Complexity of Data Reviewed  Independent Historian: parent  Radiology: ordered and independent interpretation performed. Decision-making details documented in ED Course.    Risk  OTC drugs.        Disposition and Plan     Clinical Impression:  1. Contusion of right elbow, initial encounter         Disposition:  Discharge  7/20/2024  6:49 pm    Follow-up:  [facility] Emergency Department  [address] [facility] [address]  [phone]  Follow up  As needed, If symptoms worsen          Medications Prescribed:  Current Discharge Medication List                                          n/a

## 2024-08-02 NOTE — ED ADULT NURSE NOTE - CHIEF COMPLAINT QUOTE
----- Message from Emy Barajas CNP sent at 7/30/2024  8:58 AM CDT -----  Please notify patient HIV is negative. Follow-up with PCP.    pt presents to ed via ems from home for evaluation of general malaise, N/V, poor po intake  reports right knee sx last week

## 2024-08-20 NOTE — ED STATDOCS - NSICDXPASTSURGICALHX_GEN_ALL_CORE_FT
[facility]   Physical Therapy Treatment Note      Name: Karina Gonsalez  Clinic Number: [id number]     Therapy Diagnosis:        Encounter Diagnoses   Name Primary?    Impaired functional mobility, balance, gait, and endurance Yes    Frequent falls        Physician: Cecy Walsh MD     Visit Date: 8/20/2024   Physician: Dno Mayen MD     Physician Orders: PT Eval and Treat neuro program  Medical Diagnosis from Referral:   G93.1 (ICD-10-CM) - Anoxic brain damage   R26.9 (ICD-10-CM) - Gait disorder      Evaluation Date: 5/22/2024  Authorization Period Expiration: 8/10/24  New plan of care: 8/15/24 to 10/10/24  Visit # / Visits authorized: 12/ 20- KX modifier     PN due: 8/30/24      Time In: ***  Time Out: ***  Total Billable Time: *** minutes     Precautions: Standard and Fall     PTA Visit #: 0/5         Subjective      Patient reports: I feel off today. Patient arrived with single point cane   She was compliant with home exercise program.  Response to previous treatment: felt alright  Functional change: on going     Pain: 0/10  Location: neck     Objective    See treatment     Treatment      Karina received the treatments listed below:       therapeutic exercises to develop strength, endurance, and ROM for *** minutes including:     Recumbent stepper L4 bilateral upper extremity/ lower extremity x 7 mins for improved strength and endurance     Parallel bar:   Hip extension blue thera band  3 x 10  Monster walks blue thera band (front/ back), 1 upper extremity- 4 laps     Bridging with feet on ball, alternating lower extremity lift 15x  Planks on elbows 5 x 30 sec     Prone:   Alternating upper extremity/ lower extremity lift 15x        neuromuscular re-education activities to improve: Balance, Coordination, and Proprioception for *** minutes. The following activities were included:     Parallel bar:   Tandem gait with 1 upper extremity support- 3 laps  Trunk rotation with 5#  ball, stance, firm 10x  Stance, firm, chest press 5# ball- 10x  Narrow base of support, foam, x 30 sec- minimal sway  Narrow base of support, foam, horizontal head turns 2 x 30 sec- minimal sway  Stance, foam, eyes open 2 x 30 sec- minimal sway  2 X 30 sec normal Base of support with eyes closed, firm- minimal- moderate sway              - close supervision    Short hurdles, reciprocal, 1 upper extremity- 4 laps  Side step over 1 short kenneth, 1 upper extremity support- 2 x  10        Karina participated in dynamic functional therapeutic activities to improve functional performance for *** minutes, including:     Sit<>stand from mat, feet on foam, no upper extremity- 2 x 10     Free Motion:   Resisted walking 3# walking forward/ backwards- 4 laps  Resisted walking 3# walking side stepping- 4 laps     Ambulation into, around and out of clinic with single point cane and supervision- modified independent         Patient Education and Home Exercises        Education provided:   - continue with home exercise program      Written Home Exercises Provided: Patient instructed to cont prior HEP. Exercises were reviewed and Karina was able to demonstrate them prior to the end of the session.  Karina demonstrated good  understanding of the education provided. See Electronic Medical Record under Patient Instructions for exercises provided during therapy sessions     Assessment     Karina tolerated physical therapy session well. Patient had difficulty completing increased duration of planks due to muscular fatigue. Stance on foam added with patient only demonstrating minimal sway. Patient can benefit from continued skilled physical therapy to improve safety with mobility.       Karina Is progressing well towards her goals.   Patient prognosis is Good.      Patient will continue to benefit from skilled outpatient physical therapy to address the deficits listed in the problem list box on initial evaluation, provide pt/family  education and to maximize pt's level of independence in the home and community environment.      Patient's spiritual, cultural and educational needs considered and pt agreeable to plan of care and goals.     Anticipated barriers to physical therapy: transportation, chronicity of injury     Goals:   Status as of 7/30/24  Short Term Goals: 6 weeks   Patient will report compliance with home exercise program for strength and balance at least 2x/ week to improve progress towards goals set. Met 7/30/24   Assess floor transfers and set goal. ongoing  Patient will perform Timed Up and Go in <15 sec without loss of balance to demonstrate improved safety with household mobility. ongoing  Patient will demonstrate improved daily mobility by performing 5 times sit<>stand test in 15 sec. Met 7/2/24  New 7/30/24: assess Functional Gait Assessment and set goals as needed- met   Revised 8/12/24: patient will demonstrate improved balance by scoring 16/30 on Functional Gait Assessment.      Long Term Goals: 12 weeks   Patient will perform Timed Up and Go in <12 sec without loss of balance to demonstrate improved safety with household mobility. ongoing  Patient will demonstrate improved daily mobility by performing 5 times sit<>stand test in 12 sec. Met 7/30/24  Patient will demonstrate a mean detectable change in balance to decrease fall risk to minimal by scoring 46/56 on Dawson Balance Assessment. Met 7/30/24  Patient will deny falls x 2 weeks to demonstrate improvement in safe mobility. Ongoing  new 8/12/24: patient will demonstrate a detectable change in balance and decrease fall risk by scoring 19/30 on Functional Gait Assessment.      Plan      Continue with physical therapy 2x/ week to include therapeutic exercise, therapeutic activity, neuromuscular re education, patient education, modalities PRN     Continue with improving balance strategies, stepping over obstacles, stepping in various directions.        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

## 2024-12-02 NOTE — H&P ADULT - VTE RISK ASSESSMENT
Get shahzado done  Ultrasound  Your initial labs are normal   Awaiting other labs  Close monitor of symptoms  Follow up in 3 months  Take care  Clau Paul D.O.          Patient Education     
<<--- Click to launch

## 2025-05-13 ENCOUNTER — APPOINTMENT (OUTPATIENT)
Dept: PEDIATRIC ALLERGY IMMUNOLOGY | Facility: CLINIC | Age: 80
End: 2025-05-13
Payer: MEDICARE

## 2025-05-13 VITALS
BODY MASS INDEX: 24.17 KG/M2 | WEIGHT: 128 LBS | OXYGEN SATURATION: 98 % | DIASTOLIC BLOOD PRESSURE: 70 MMHG | HEART RATE: 76 BPM | HEIGHT: 61 IN | SYSTOLIC BLOOD PRESSURE: 130 MMHG

## 2025-05-13 DIAGNOSIS — L30.9 DERMATITIS, UNSPECIFIED: ICD-10-CM

## 2025-05-13 DIAGNOSIS — B07.9 VIRAL WART, UNSPECIFIED: ICD-10-CM

## 2025-05-13 PROCEDURE — 99203 OFFICE O/P NEW LOW 30 MIN: CPT

## 2025-06-18 ENCOUNTER — NON-APPOINTMENT (OUTPATIENT)
Age: 80
End: 2025-06-18

## (undated) DEVICE — SUT VICRYL PLUS 2-0 27" CP-1 UNDYED

## (undated) DEVICE — SOLIDIFIER CANN EXPRESS 3K

## (undated) DEVICE — SOL IRR POUR NS 0.9% 500ML

## (undated) DEVICE — Device

## (undated) DEVICE — NDL SPINAL 18G X 3.5"

## (undated) DEVICE — VESSEL LOOP MINI-BLUE 0.075" X 16"

## (undated) DEVICE — KIT OPTIVAC CEMENT MIXER 40GM

## (undated) DEVICE — TUBING CAP SET ERBEFLO CLEVERCAP HYBRID CO2 FOR OLYMPUS SCOPES AND UCR

## (undated) DEVICE — SUCTION YANKAUER OPEN TIP NO VENT CURVE

## (undated) DEVICE — ELCTR BOVIE PENCIL HANDPIECE ROCKER SWITCH 15FT

## (undated) DEVICE — DEVICE ORTHALIGN PLUS

## (undated) DEVICE — SOL IRR POUR H2O 1500ML

## (undated) DEVICE — WRAP COMPRESSION CALF MED

## (undated) DEVICE — IRRISEPT JET LAVAGE W 0.05 PCT CHG

## (undated) DEVICE — PACK LITHOTOMY

## (undated) DEVICE — DRSG 4X4

## (undated) DEVICE — SUT SOFSILK 2-0 30" TIES

## (undated) DEVICE — SUT DERMABOND PRINEO 60CM

## (undated) DEVICE — ELCTR PENCIL NEPTUNE SMOKE EVACUATION

## (undated) DEVICE — SUT MONOSOF 3-0 30" C-16

## (undated) DEVICE — GLV COTTON DEROYAL XL

## (undated) DEVICE — HOOD FLYTE STRYKER HELMET SHIELD

## (undated) DEVICE — KNEEALIGN TIBIAL AND FEMORAL

## (undated) DEVICE — DRAPE LEGGINGS XL

## (undated) DEVICE — SUT VICRYL PLUS 1 27" CP UNDYED

## (undated) DEVICE — PACK TOTAL KNEE

## (undated) DEVICE — POSITIONER FOAM EGG CRATE ULNAR 2PCS (PINK)

## (undated) DEVICE — WARMING BLANKET UPPER ADULT

## (undated) DEVICE — SOL IRR POUR H2O 250ML

## (undated) DEVICE — SEALER BIPOLAR 6.0 AQUAMANTYS

## (undated) DEVICE — CONTAINER SPECIMEN PET

## (undated) DEVICE — BLANKET WARMER UPPER ADULT

## (undated) DEVICE — PACK BASIN SPECIAL PROCEDURE

## (undated) DEVICE — DRAPE TOWEL BLUE 17" X 24"

## (undated) DEVICE — VENODYNE/SCD SLEEVE CALF MEDIUM

## (undated) DEVICE — CUFF TOURNIQUET 34" DUAL PORT W PLC

## (undated) DEVICE — SUT QUILL MONODERM 2-0 30CM 18MM

## (undated) DEVICE — SYR LUER LOK 50CC

## (undated) DEVICE — SOL IRR BAG NS 0.9% 3000ML

## (undated) DEVICE — GOWN XL W TOWEL

## (undated) DEVICE — SYR LUER LOK 20CC

## (undated) DEVICE — DRSG COMBINE 5X9"

## (undated) DEVICE — GOWN TRIMAX XXL

## (undated) DEVICE — SYM-STRYKER SYSTEM 7: Type: DURABLE MEDICAL EQUIPMENT

## (undated) DEVICE — DRSG XEROFORM 1"

## (undated) DEVICE — SUT MONOCRYL 3-0 18" PS-1